# Patient Record
Sex: FEMALE | Race: BLACK OR AFRICAN AMERICAN | NOT HISPANIC OR LATINO | URBAN - METROPOLITAN AREA
[De-identification: names, ages, dates, MRNs, and addresses within clinical notes are randomized per-mention and may not be internally consistent; named-entity substitution may affect disease eponyms.]

---

## 2017-09-06 ENCOUNTER — OUTPATIENT (OUTPATIENT)
Dept: OUTPATIENT SERVICES | Facility: HOSPITAL | Age: 72
LOS: 1 days | End: 2017-09-06
Payer: COMMERCIAL

## 2017-09-06 VITALS
HEART RATE: 72 BPM | TEMPERATURE: 98 F | WEIGHT: 182.1 LBS | DIASTOLIC BLOOD PRESSURE: 70 MMHG | HEIGHT: 66 IN | OXYGEN SATURATION: 98 % | RESPIRATION RATE: 16 BRPM | SYSTOLIC BLOOD PRESSURE: 138 MMHG

## 2017-09-06 DIAGNOSIS — Z98.890 OTHER SPECIFIED POSTPROCEDURAL STATES: Chronic | ICD-10-CM

## 2017-09-06 DIAGNOSIS — M17.11 UNILATERAL PRIMARY OSTEOARTHRITIS, RIGHT KNEE: ICD-10-CM

## 2017-09-06 DIAGNOSIS — Z90.721 ACQUIRED ABSENCE OF OVARIES, UNILATERAL: Chronic | ICD-10-CM

## 2017-09-06 DIAGNOSIS — D17.0 BENIGN LIPOMATOUS NEOPLASM OF SKIN AND SUBCUTANEOUS TISSUE OF HEAD, FACE AND NECK: Chronic | ICD-10-CM

## 2017-09-06 DIAGNOSIS — Z01.818 ENCOUNTER FOR OTHER PREPROCEDURAL EXAMINATION: ICD-10-CM

## 2017-09-06 DIAGNOSIS — D17.39 BENIGN LIPOMATOUS NEOPLASM OF SKIN AND SUBCUTANEOUS TISSUE OF OTHER SITES: Chronic | ICD-10-CM

## 2017-09-06 DIAGNOSIS — Z90.49 ACQUIRED ABSENCE OF OTHER SPECIFIED PARTS OF DIGESTIVE TRACT: Chronic | ICD-10-CM

## 2017-09-06 PROCEDURE — 93017 CV STRESS TEST TRACING ONLY: CPT

## 2017-09-06 PROCEDURE — A9502: CPT

## 2017-09-06 PROCEDURE — 86901 BLOOD TYPING SEROLOGIC RH(D): CPT

## 2017-09-06 PROCEDURE — G0463: CPT

## 2017-09-06 PROCEDURE — 87640 STAPH A DNA AMP PROBE: CPT

## 2017-09-06 PROCEDURE — 87641 MR-STAPH DNA AMP PROBE: CPT

## 2017-09-06 PROCEDURE — 86900 BLOOD TYPING SEROLOGIC ABO: CPT

## 2017-09-06 PROCEDURE — 78452 HT MUSCLE IMAGE SPECT MULT: CPT

## 2017-09-06 PROCEDURE — 86850 RBC ANTIBODY SCREEN: CPT

## 2017-09-06 RX ORDER — SODIUM CHLORIDE 9 MG/ML
3 INJECTION INTRAMUSCULAR; INTRAVENOUS; SUBCUTANEOUS EVERY 8 HOURS
Qty: 0 | Refills: 0 | Status: DISCONTINUED | OUTPATIENT
Start: 2017-09-12 | End: 2017-09-12

## 2017-09-06 NOTE — H&P PST ADULT - PROBLEM SELECTOR PLAN 1
Patient is scheduled for right total knee replacement on 9/12/17. Patient is at moderate risk for this surgery.

## 2017-09-06 NOTE — H&P PST ADULT - PMH
Carpal tunnel syndrome on left    Cataract  bilateral eyes  CKD (chronic kidney disease)    Diabetes mellitus, type 2    Gout    Hypertension    Ovarian cyst  right  Primary osteoarthritis of left knee    Primary osteoarthritis of right knee    Spinal stenosis of lumbosacral region

## 2017-09-06 NOTE — H&P PST ADULT - NEGATIVE GASTROINTESTINAL SYMPTOMS
no constipation/no diarrhea/no nausea/no vomiting/no flatulence/no abdominal pain/no change in bowel habits

## 2017-09-06 NOTE — H&P PST ADULT - MUSCULOSKELETAL
details… detailed exam no joint warmth/decreased ROM due to pain/no calf tenderness/no joint erythema/no joint swelling/bilateral knees

## 2017-09-06 NOTE — H&P PST ADULT - HISTORY OF PRESENT ILLNESS
72 years old female presented with right and left knee pain, limited ROM, difficulty walking, uses cane x long time, right knee is worse than left . Diagnosed with unilateral  primary OA of right knee and is scheduled for right total knee replacement on 9/12/17.

## 2017-09-06 NOTE — H&P PST ADULT - DOES PATIENT HAVE ADVANCE DIRECTIVE
in  of emergency call patient's daughter Lucas Knox [phone] to make medical decisions about pt/No

## 2017-09-06 NOTE — H&P PST ADULT - NSANTHOSAYNRD_GEN_A_CORE
No. AL screening performed.  STOP BANG Legend: 0-2 = LOW Risk; 3-4 = INTERMEDIATE Risk; 5-8 = HIGH Risk

## 2017-09-06 NOTE — H&P PST ADULT - PSH
History of right salpingo-oophorectomy  1994  Lipoma of chest wall  sx removal  Lipoma of neck  sx removal  S/P appendectomy  1994  S/P carpal tunnel release  left hand- 2007

## 2017-09-07 LAB
MRSA PCR RESULT.: SIGNIFICANT CHANGE UP
S AUREUS DNA NOSE QL NAA+PROBE: SIGNIFICANT CHANGE UP

## 2017-09-12 ENCOUNTER — TRANSCRIPTION ENCOUNTER (OUTPATIENT)
Age: 72
End: 2017-09-12

## 2017-09-12 ENCOUNTER — RESULT REVIEW (OUTPATIENT)
Age: 72
End: 2017-09-12

## 2017-09-12 ENCOUNTER — INPATIENT (INPATIENT)
Facility: HOSPITAL | Age: 72
LOS: 3 days | Discharge: HOME HEALTH | DRG: 470 | End: 2017-09-16
Attending: ORTHOPAEDIC SURGERY | Admitting: ORTHOPAEDIC SURGERY
Payer: COMMERCIAL

## 2017-09-12 VITALS
OXYGEN SATURATION: 99 % | HEART RATE: 83 BPM | DIASTOLIC BLOOD PRESSURE: 73 MMHG | TEMPERATURE: 99 F | WEIGHT: 182.1 LBS | HEIGHT: 66 IN | RESPIRATION RATE: 14 BRPM | SYSTOLIC BLOOD PRESSURE: 168 MMHG

## 2017-09-12 DIAGNOSIS — D17.0 BENIGN LIPOMATOUS NEOPLASM OF SKIN AND SUBCUTANEOUS TISSUE OF HEAD, FACE AND NECK: Chronic | ICD-10-CM

## 2017-09-12 DIAGNOSIS — M17.11 UNILATERAL PRIMARY OSTEOARTHRITIS, RIGHT KNEE: ICD-10-CM

## 2017-09-12 DIAGNOSIS — Z98.890 OTHER SPECIFIED POSTPROCEDURAL STATES: Chronic | ICD-10-CM

## 2017-09-12 DIAGNOSIS — Z90.49 ACQUIRED ABSENCE OF OTHER SPECIFIED PARTS OF DIGESTIVE TRACT: Chronic | ICD-10-CM

## 2017-09-12 DIAGNOSIS — Z90.721 ACQUIRED ABSENCE OF OVARIES, UNILATERAL: Chronic | ICD-10-CM

## 2017-09-12 DIAGNOSIS — D17.39 BENIGN LIPOMATOUS NEOPLASM OF SKIN AND SUBCUTANEOUS TISSUE OF OTHER SITES: Chronic | ICD-10-CM

## 2017-09-12 LAB
ANION GAP SERPL CALC-SCNC: 7 MMOL/L — SIGNIFICANT CHANGE UP (ref 5–17)
BUN SERPL-MCNC: 33 MG/DL — HIGH (ref 7–18)
CALCIUM SERPL-MCNC: 8.7 MG/DL — SIGNIFICANT CHANGE UP (ref 8.4–10.5)
CHLORIDE SERPL-SCNC: 107 MMOL/L — SIGNIFICANT CHANGE UP (ref 96–108)
CO2 SERPL-SCNC: 24 MMOL/L — SIGNIFICANT CHANGE UP (ref 22–31)
CREAT SERPL-MCNC: 1.64 MG/DL — HIGH (ref 0.5–1.3)
GLUCOSE SERPL-MCNC: 233 MG/DL — HIGH (ref 70–99)
HCT VFR BLD CALC: 30.6 % — LOW (ref 34.5–45)
HGB BLD-MCNC: 10.4 G/DL — LOW (ref 11.5–15.5)
MCHC RBC-ENTMCNC: 29.8 PG — SIGNIFICANT CHANGE UP (ref 27–34)
MCHC RBC-ENTMCNC: 33.8 GM/DL — SIGNIFICANT CHANGE UP (ref 32–36)
MCV RBC AUTO: 88.1 FL — SIGNIFICANT CHANGE UP (ref 80–100)
PLATELET # BLD AUTO: 148 K/UL — LOW (ref 150–400)
POTASSIUM SERPL-MCNC: 4.6 MMOL/L — SIGNIFICANT CHANGE UP (ref 3.5–5.3)
POTASSIUM SERPL-SCNC: 4.6 MMOL/L — SIGNIFICANT CHANGE UP (ref 3.5–5.3)
RBC # BLD: 3.47 M/UL — LOW (ref 3.8–5.2)
RBC # FLD: 13.4 % — SIGNIFICANT CHANGE UP (ref 10.3–14.5)
SODIUM SERPL-SCNC: 138 MMOL/L — SIGNIFICANT CHANGE UP (ref 135–145)
WBC # BLD: 7 K/UL — SIGNIFICANT CHANGE UP (ref 3.8–10.5)
WBC # FLD AUTO: 7 K/UL — SIGNIFICANT CHANGE UP (ref 3.8–10.5)

## 2017-09-12 PROCEDURE — 20900 REMOVAL OF BONE FOR GRAFT: CPT | Mod: AS,59

## 2017-09-12 PROCEDURE — 73562 X-RAY EXAM OF KNEE 3: CPT | Mod: 26,RT

## 2017-09-12 PROCEDURE — 27339 EXC THIGH/KNEE TUM DEP 5CM/>: CPT | Mod: AS,59,RT

## 2017-09-12 PROCEDURE — 88311 DECALCIFY TISSUE: CPT | Mod: 26

## 2017-09-12 PROCEDURE — 88305 TISSUE EXAM BY PATHOLOGIST: CPT | Mod: 26

## 2017-09-12 PROCEDURE — 27447 TOTAL KNEE ARTHROPLASTY: CPT | Mod: AS,RT

## 2017-09-12 PROCEDURE — 73560 X-RAY EXAM OF KNEE 1 OR 2: CPT | Mod: 26

## 2017-09-12 RX ORDER — SODIUM CHLORIDE 9 MG/ML
1000 INJECTION, SOLUTION INTRAVENOUS
Qty: 0 | Refills: 0 | Status: DISCONTINUED | OUTPATIENT
Start: 2017-09-12 | End: 2017-09-16

## 2017-09-12 RX ORDER — BUPIVACAINE 13.3 MG/ML
20 INJECTION, SUSPENSION, LIPOSOMAL INFILTRATION ONCE
Qty: 0 | Refills: 0 | Status: COMPLETED | OUTPATIENT
Start: 2017-09-12 | End: 2017-09-12

## 2017-09-12 RX ORDER — ENOXAPARIN SODIUM 100 MG/ML
30 INJECTION SUBCUTANEOUS EVERY 12 HOURS
Qty: 0 | Refills: 0 | Status: DISCONTINUED | OUTPATIENT
Start: 2017-09-12 | End: 2017-09-16

## 2017-09-12 RX ORDER — FOLIC ACID 0.8 MG
1 TABLET ORAL DAILY
Qty: 0 | Refills: 0 | Status: DISCONTINUED | OUTPATIENT
Start: 2017-09-12 | End: 2017-09-16

## 2017-09-12 RX ORDER — INSULIN LISPRO 100/ML
VIAL (ML) SUBCUTANEOUS
Qty: 0 | Refills: 0 | Status: DISCONTINUED | OUTPATIENT
Start: 2017-09-12 | End: 2017-09-16

## 2017-09-12 RX ORDER — CARVEDILOL PHOSPHATE 80 MG/1
12.5 CAPSULE, EXTENDED RELEASE ORAL EVERY 12 HOURS
Qty: 0 | Refills: 0 | Status: DISCONTINUED | OUTPATIENT
Start: 2017-09-12 | End: 2017-09-16

## 2017-09-12 RX ORDER — ONDANSETRON 8 MG/1
4 TABLET, FILM COATED ORAL EVERY 6 HOURS
Qty: 0 | Refills: 0 | Status: DISCONTINUED | OUTPATIENT
Start: 2017-09-12 | End: 2017-09-16

## 2017-09-12 RX ORDER — AMLODIPINE BESYLATE 2.5 MG/1
10 TABLET ORAL DAILY
Qty: 0 | Refills: 0 | Status: DISCONTINUED | OUTPATIENT
Start: 2017-09-12 | End: 2017-09-16

## 2017-09-12 RX ORDER — ATORVASTATIN CALCIUM 80 MG/1
40 TABLET, FILM COATED ORAL AT BEDTIME
Qty: 0 | Refills: 0 | Status: DISCONTINUED | OUTPATIENT
Start: 2017-09-12 | End: 2017-09-16

## 2017-09-12 RX ORDER — ALLOPURINOL 300 MG
1 TABLET ORAL
Qty: 0 | Refills: 0 | COMMUNITY

## 2017-09-12 RX ORDER — DOCUSATE SODIUM 100 MG
100 CAPSULE ORAL THREE TIMES A DAY
Qty: 0 | Refills: 0 | Status: DISCONTINUED | OUTPATIENT
Start: 2017-09-12 | End: 2017-09-16

## 2017-09-12 RX ORDER — CEFAZOLIN SODIUM 1 G
1000 VIAL (EA) INJECTION EVERY 8 HOURS
Qty: 0 | Refills: 0 | Status: COMPLETED | OUTPATIENT
Start: 2017-09-12 | End: 2017-09-13

## 2017-09-12 RX ORDER — DEXTROSE 50 % IN WATER 50 %
25 SYRINGE (ML) INTRAVENOUS ONCE
Qty: 0 | Refills: 0 | Status: DISCONTINUED | OUTPATIENT
Start: 2017-09-12 | End: 2017-09-16

## 2017-09-12 RX ORDER — GLUCAGON INJECTION, SOLUTION 0.5 MG/.1ML
1 INJECTION, SOLUTION SUBCUTANEOUS ONCE
Qty: 0 | Refills: 0 | Status: DISCONTINUED | OUTPATIENT
Start: 2017-09-12 | End: 2017-09-16

## 2017-09-12 RX ORDER — SENNA PLUS 8.6 MG/1
2 TABLET ORAL AT BEDTIME
Qty: 0 | Refills: 0 | Status: DISCONTINUED | OUTPATIENT
Start: 2017-09-12 | End: 2017-09-16

## 2017-09-12 RX ORDER — HYDROMORPHONE HYDROCHLORIDE 2 MG/ML
0.5 INJECTION INTRAMUSCULAR; INTRAVENOUS; SUBCUTANEOUS
Qty: 0 | Refills: 0 | Status: DISCONTINUED | OUTPATIENT
Start: 2017-09-12 | End: 2017-09-14

## 2017-09-12 RX ORDER — HYDROMORPHONE HYDROCHLORIDE 2 MG/ML
0.5 INJECTION INTRAMUSCULAR; INTRAVENOUS; SUBCUTANEOUS
Qty: 0 | Refills: 0 | Status: DISCONTINUED | OUTPATIENT
Start: 2017-09-12 | End: 2017-09-12

## 2017-09-12 RX ORDER — ASCORBIC ACID 60 MG
500 TABLET,CHEWABLE ORAL
Qty: 0 | Refills: 0 | Status: DISCONTINUED | OUTPATIENT
Start: 2017-09-12 | End: 2017-09-16

## 2017-09-12 RX ORDER — NALOXONE HYDROCHLORIDE 4 MG/.1ML
0.1 SPRAY NASAL
Qty: 0 | Refills: 0 | Status: DISCONTINUED | OUTPATIENT
Start: 2017-09-12 | End: 2017-09-16

## 2017-09-12 RX ORDER — FERROUS SULFATE 325(65) MG
325 TABLET ORAL
Qty: 0 | Refills: 0 | Status: DISCONTINUED | OUTPATIENT
Start: 2017-09-12 | End: 2017-09-16

## 2017-09-12 RX ORDER — ACETAMINOPHEN 500 MG
1000 TABLET ORAL ONCE
Qty: 0 | Refills: 0 | Status: DISCONTINUED | OUTPATIENT
Start: 2017-09-12 | End: 2017-09-14

## 2017-09-12 RX ORDER — DEXTROSE 50 % IN WATER 50 %
1 SYRINGE (ML) INTRAVENOUS ONCE
Qty: 0 | Refills: 0 | Status: DISCONTINUED | OUTPATIENT
Start: 2017-09-12 | End: 2017-09-16

## 2017-09-12 RX ORDER — HYDROMORPHONE HYDROCHLORIDE 2 MG/ML
30 INJECTION INTRAMUSCULAR; INTRAVENOUS; SUBCUTANEOUS
Qty: 0 | Refills: 0 | Status: DISCONTINUED | OUTPATIENT
Start: 2017-09-12 | End: 2017-09-14

## 2017-09-12 RX ORDER — LISINOPRIL 2.5 MG/1
40 TABLET ORAL DAILY
Qty: 0 | Refills: 0 | Status: DISCONTINUED | OUTPATIENT
Start: 2017-09-12 | End: 2017-09-16

## 2017-09-12 RX ORDER — ACETAMINOPHEN 500 MG
650 TABLET ORAL EVERY 6 HOURS
Qty: 0 | Refills: 0 | Status: DISCONTINUED | OUTPATIENT
Start: 2017-09-12 | End: 2017-09-16

## 2017-09-12 RX ORDER — DEXTROSE 50 % IN WATER 50 %
12.5 SYRINGE (ML) INTRAVENOUS ONCE
Qty: 0 | Refills: 0 | Status: DISCONTINUED | OUTPATIENT
Start: 2017-09-12 | End: 2017-09-16

## 2017-09-12 RX ADMIN — Medication 100 MILLIGRAM(S): at 21:05

## 2017-09-12 RX ADMIN — HYDROMORPHONE HYDROCHLORIDE 30 MILLILITER(S): 2 INJECTION INTRAMUSCULAR; INTRAVENOUS; SUBCUTANEOUS at 15:28

## 2017-09-12 RX ADMIN — Medication 325 MILLIGRAM(S): at 17:41

## 2017-09-12 RX ADMIN — Medication 4: at 17:41

## 2017-09-12 RX ADMIN — HYDROMORPHONE HYDROCHLORIDE 30 MILLILITER(S): 2 INJECTION INTRAMUSCULAR; INTRAVENOUS; SUBCUTANEOUS at 11:33

## 2017-09-12 RX ADMIN — HYDROMORPHONE HYDROCHLORIDE 30 MILLILITER(S): 2 INJECTION INTRAMUSCULAR; INTRAVENOUS; SUBCUTANEOUS at 15:02

## 2017-09-12 RX ADMIN — Medication 500 MILLIGRAM(S): at 17:41

## 2017-09-12 RX ADMIN — Medication 6: at 12:09

## 2017-09-12 RX ADMIN — ENOXAPARIN SODIUM 30 MILLIGRAM(S): 100 INJECTION SUBCUTANEOUS at 21:04

## 2017-09-12 RX ADMIN — SODIUM CHLORIDE 75 MILLILITER(S): 9 INJECTION, SOLUTION INTRAVENOUS at 17:41

## 2017-09-12 RX ADMIN — ATORVASTATIN CALCIUM 40 MILLIGRAM(S): 80 TABLET, FILM COATED ORAL at 21:05

## 2017-09-12 RX ADMIN — HYDROMORPHONE HYDROCHLORIDE 30 MILLILITER(S): 2 INJECTION INTRAMUSCULAR; INTRAVENOUS; SUBCUTANEOUS at 19:06

## 2017-09-12 RX ADMIN — Medication 100 MILLIGRAM(S): at 18:09

## 2017-09-12 RX ADMIN — HYDROMORPHONE HYDROCHLORIDE 0.5 MILLIGRAM(S): 2 INJECTION INTRAMUSCULAR; INTRAVENOUS; SUBCUTANEOUS at 11:25

## 2017-09-12 RX ADMIN — HYDROMORPHONE HYDROCHLORIDE 0.5 MILLIGRAM(S): 2 INJECTION INTRAMUSCULAR; INTRAVENOUS; SUBCUTANEOUS at 12:12

## 2017-09-12 NOTE — PATIENT PROFILE ADULT. - DOES PATIENT HAVE ADVANCE DIRECTIVE
in  of emergency call patient's daughter Lucas Knox [phone] to make medical decisions about pt/No

## 2017-09-12 NOTE — PHYSICAL THERAPY INITIAL EVALUATION ADULT - IMPAIRMENTS FOUND, PT EVAL
ROM/gait, locomotion, and balance/joint integrity and mobility/muscle strength/gross motor/aerobic capacity/endurance

## 2017-09-12 NOTE — BRIEF OPERATIVE NOTE - PROCEDURE
Total knee replacement  09/12/2017  right  Active  DOROTHEA <<-----Click on this checkbox to enter Procedure Total knee replacement  09/12/2017  right  Active  Deep Fernandez

## 2017-09-12 NOTE — PHYSICAL THERAPY INITIAL EVALUATION ADULT - LEVEL OF INDEPENDENCE: STAIR NEGOTIATION, REHAB EVAL
Unable to assess pt on the stairs at this time, pt does not exhibit appropriate pre requisite skills to safely negotiate unlevel surfaces due to poor endurance, decrease musculature, unsteady gait and day 0 TKR which placed pt significant risks for falls and injury

## 2017-09-12 NOTE — PHYSICAL THERAPY INITIAL EVALUATION ADULT - GENERAL OBSERVATIONS, REHAB EVAL
pt seen day 0, resting in bed, PCA meds in place, s/p TKR denies pain, review pre op class TE, able to long sit with assistance

## 2017-09-13 DIAGNOSIS — G89.18 OTHER ACUTE POSTPROCEDURAL PAIN: ICD-10-CM

## 2017-09-13 DIAGNOSIS — I10 ESSENTIAL (PRIMARY) HYPERTENSION: ICD-10-CM

## 2017-09-13 DIAGNOSIS — N18.9 CHRONIC KIDNEY DISEASE, UNSPECIFIED: ICD-10-CM

## 2017-09-13 DIAGNOSIS — M25.561 PAIN IN RIGHT KNEE: ICD-10-CM

## 2017-09-13 DIAGNOSIS — Z96.651 PRESENCE OF RIGHT ARTIFICIAL KNEE JOINT: ICD-10-CM

## 2017-09-13 DIAGNOSIS — E11.9 TYPE 2 DIABETES MELLITUS WITHOUT COMPLICATIONS: ICD-10-CM

## 2017-09-13 LAB
ANION GAP SERPL CALC-SCNC: 11 MMOL/L — SIGNIFICANT CHANGE UP (ref 5–17)
BUN SERPL-MCNC: 39 MG/DL — HIGH (ref 7–18)
CALCIUM SERPL-MCNC: 8.6 MG/DL — SIGNIFICANT CHANGE UP (ref 8.4–10.5)
CHLORIDE SERPL-SCNC: 102 MMOL/L — SIGNIFICANT CHANGE UP (ref 96–108)
CO2 SERPL-SCNC: 23 MMOL/L — SIGNIFICANT CHANGE UP (ref 22–31)
CREAT SERPL-MCNC: 1.67 MG/DL — HIGH (ref 0.5–1.3)
GLUCOSE SERPL-MCNC: 168 MG/DL — HIGH (ref 70–99)
HBA1C BLD-MCNC: 7.4 % — HIGH (ref 4–5.6)
HCT VFR BLD CALC: 27 % — LOW (ref 34.5–45)
HGB BLD-MCNC: 9.3 G/DL — LOW (ref 11.5–15.5)
MCHC RBC-ENTMCNC: 30.4 PG — SIGNIFICANT CHANGE UP (ref 27–34)
MCHC RBC-ENTMCNC: 34.5 GM/DL — SIGNIFICANT CHANGE UP (ref 32–36)
MCV RBC AUTO: 88 FL — SIGNIFICANT CHANGE UP (ref 80–100)
PLATELET # BLD AUTO: 139 K/UL — LOW (ref 150–400)
POTASSIUM SERPL-MCNC: 4.8 MMOL/L — SIGNIFICANT CHANGE UP (ref 3.5–5.3)
POTASSIUM SERPL-SCNC: 4.8 MMOL/L — SIGNIFICANT CHANGE UP (ref 3.5–5.3)
RBC # BLD: 3.06 M/UL — LOW (ref 3.8–5.2)
RBC # FLD: 13.4 % — SIGNIFICANT CHANGE UP (ref 10.3–14.5)
SODIUM SERPL-SCNC: 136 MMOL/L — SIGNIFICANT CHANGE UP (ref 135–145)
WBC # BLD: 12.2 K/UL — HIGH (ref 3.8–10.5)
WBC # FLD AUTO: 12.2 K/UL — HIGH (ref 3.8–10.5)

## 2017-09-13 PROCEDURE — 99233 SBSQ HOSP IP/OBS HIGH 50: CPT

## 2017-09-13 RX ADMIN — CARVEDILOL PHOSPHATE 12.5 MILLIGRAM(S): 80 CAPSULE, EXTENDED RELEASE ORAL at 05:50

## 2017-09-13 RX ADMIN — HYDROMORPHONE HYDROCHLORIDE 30 MILLILITER(S): 2 INJECTION INTRAMUSCULAR; INTRAVENOUS; SUBCUTANEOUS at 07:07

## 2017-09-13 RX ADMIN — Medication 1 MILLIGRAM(S): at 11:14

## 2017-09-13 RX ADMIN — Medication 325 MILLIGRAM(S): at 11:14

## 2017-09-13 RX ADMIN — Medication 2: at 17:20

## 2017-09-13 RX ADMIN — ENOXAPARIN SODIUM 30 MILLIGRAM(S): 100 INJECTION SUBCUTANEOUS at 11:13

## 2017-09-13 RX ADMIN — Medication 100 MILLIGRAM(S): at 14:45

## 2017-09-13 RX ADMIN — Medication: at 07:32

## 2017-09-13 RX ADMIN — Medication 100 MILLIGRAM(S): at 21:20

## 2017-09-13 RX ADMIN — Medication 100 MILLIGRAM(S): at 05:50

## 2017-09-13 RX ADMIN — CARVEDILOL PHOSPHATE 12.5 MILLIGRAM(S): 80 CAPSULE, EXTENDED RELEASE ORAL at 17:20

## 2017-09-13 RX ADMIN — Medication 100 MILLIGRAM(S): at 01:03

## 2017-09-13 RX ADMIN — ENOXAPARIN SODIUM 30 MILLIGRAM(S): 100 INJECTION SUBCUTANEOUS at 21:20

## 2017-09-13 RX ADMIN — AMLODIPINE BESYLATE 10 MILLIGRAM(S): 2.5 TABLET ORAL at 05:50

## 2017-09-13 RX ADMIN — HYDROMORPHONE HYDROCHLORIDE 30 MILLILITER(S): 2 INJECTION INTRAMUSCULAR; INTRAVENOUS; SUBCUTANEOUS at 19:07

## 2017-09-13 RX ADMIN — Medication 500 MILLIGRAM(S): at 17:20

## 2017-09-13 RX ADMIN — Medication 325 MILLIGRAM(S): at 07:48

## 2017-09-13 RX ADMIN — ATORVASTATIN CALCIUM 40 MILLIGRAM(S): 80 TABLET, FILM COATED ORAL at 21:20

## 2017-09-13 RX ADMIN — Medication 500 MILLIGRAM(S): at 05:50

## 2017-09-13 RX ADMIN — Medication 325 MILLIGRAM(S): at 17:20

## 2017-09-13 RX ADMIN — LISINOPRIL 40 MILLIGRAM(S): 2.5 TABLET ORAL at 05:50

## 2017-09-13 RX ADMIN — Medication 6: at 11:13

## 2017-09-13 NOTE — PROGRESS NOTE ADULT - SUBJECTIVE AND OBJECTIVE BOX
72yFemale    Diagnosis:  S/p R Total Knee Replacement POD# 1    Patient was seen and evaluated at bedside. Patient with no acute complaints.   Pain is  well controlled.  Jacquie Rodriguez this AM.   Pain is controlled via PCA.  Denies CP/SOB, dyspnea, paresthesias, N/V/D, palpitations.     Vital Signs Last 24 Hrs  T(C): 36.6 (13 Sep 2017 06:39), Max: 36.8 (12 Sep 2017 20:58)  T(F): 97.9 (13 Sep 2017 06:39), Max: 98.3 (12 Sep 2017 20:58)  HR: 75 (13 Sep 2017 06:39) (55 - 87)  BP: 136/56 (13 Sep 2017 06:39) (129/64 - 158/79)  BP(mean): --  RR: 17 (13 Sep 2017 06:39) (14 - 18)  SpO2: 100% (13 Sep 2017 06:39) (100% - 100%)  I&O's Detail    12 Sep 2017 07:01  -  13 Sep 2017 07:00  --------------------------------------------------------  IN:    sodium chloride 0.45%.: 300 mL  Total IN: 300 mL    OUT:    Accordian: 20 mL    Bulb: 570 mL    Indwelling Catheter - Urethral: 1220 mL  Total OUT: 1810 mL    Total NET: -1510 mL      Physical Exam:    General: AAOx3, NAD, resting comfortably in bed.    R KNEE:  Dressing is C/D/I. No swelling. No erythema. In normal alignment.   Lower extremity:  No calf tenderness, calves are soft. 2+pulses. NVI. 5/5 Strength of EHL/TA/gastrocnemius B/L.  Good capillary refill.                           9.3    12.2  )-----------( 139      ( 13 Sep 2017 07:37 )             27.0     09-13    136  |  102  |  39<H>  ----------------------------<  168<H>  4.8   |  23  |  1.67<H>    Ca    8.6      13 Sep 2017 07:37        Impression:  72yFemale S/p R Total Knee Replacement POD# 1  Plan:  -  Pain management  -  Dvt prophylaxis  -  Daily Physical Therapy:  WBAT  to RLE  -  Discharge planning: Home Vs. Rehab pending Physical therapy eval.  -  Heel bump to RLE  -  Incentive Spirometer  -  Continue with Post-op Antibiotics x 24hrs  -  Discontinued Dhillon catheter today  -  D/c   -  Case d/w DR. Vera

## 2017-09-13 NOTE — PROGRESS NOTE ADULT - PROBLEM SELECTOR PLAN 1
- continue pca hydromorphone  - pca teaching teaching done  - iv acetaminophen prn  - no nsaids due to casper  - stool softeners  - oob/pt

## 2017-09-14 LAB
ANION GAP SERPL CALC-SCNC: 10 MMOL/L — SIGNIFICANT CHANGE UP (ref 5–17)
BUN SERPL-MCNC: 33 MG/DL — HIGH (ref 7–18)
CALCIUM SERPL-MCNC: 8.9 MG/DL — SIGNIFICANT CHANGE UP (ref 8.4–10.5)
CHLORIDE SERPL-SCNC: 101 MMOL/L — SIGNIFICANT CHANGE UP (ref 96–108)
CO2 SERPL-SCNC: 24 MMOL/L — SIGNIFICANT CHANGE UP (ref 22–31)
CREAT SERPL-MCNC: 1.3 MG/DL — SIGNIFICANT CHANGE UP (ref 0.5–1.3)
GLUCOSE SERPL-MCNC: 191 MG/DL — HIGH (ref 70–99)
HCT VFR BLD CALC: 23.7 % — LOW (ref 34.5–45)
HGB BLD-MCNC: 8.1 G/DL — LOW (ref 11.5–15.5)
MCHC RBC-ENTMCNC: 30.1 PG — SIGNIFICANT CHANGE UP (ref 27–34)
MCHC RBC-ENTMCNC: 34.3 GM/DL — SIGNIFICANT CHANGE UP (ref 32–36)
MCV RBC AUTO: 87.7 FL — SIGNIFICANT CHANGE UP (ref 80–100)
PLATELET # BLD AUTO: 110 K/UL — LOW (ref 150–400)
POTASSIUM SERPL-MCNC: 4.6 MMOL/L — SIGNIFICANT CHANGE UP (ref 3.5–5.3)
POTASSIUM SERPL-SCNC: 4.6 MMOL/L — SIGNIFICANT CHANGE UP (ref 3.5–5.3)
RBC # BLD: 2.7 M/UL — LOW (ref 3.8–5.2)
RBC # FLD: 13.3 % — SIGNIFICANT CHANGE UP (ref 10.3–14.5)
SODIUM SERPL-SCNC: 135 MMOL/L — SIGNIFICANT CHANGE UP (ref 135–145)
SURGICAL PATHOLOGY FINAL REPORT - CH: SIGNIFICANT CHANGE UP
WBC # BLD: 10.5 K/UL — SIGNIFICANT CHANGE UP (ref 3.8–10.5)
WBC # FLD AUTO: 10.5 K/UL — SIGNIFICANT CHANGE UP (ref 3.8–10.5)

## 2017-09-14 PROCEDURE — 99233 SBSQ HOSP IP/OBS HIGH 50: CPT

## 2017-09-14 RX ORDER — TRAMADOL HYDROCHLORIDE 50 MG/1
50 TABLET ORAL EVERY 4 HOURS
Qty: 0 | Refills: 0 | Status: DISCONTINUED | OUTPATIENT
Start: 2017-09-14 | End: 2017-09-16

## 2017-09-14 RX ORDER — FUROSEMIDE 40 MG
40 TABLET ORAL ONCE
Qty: 0 | Refills: 0 | Status: COMPLETED | OUTPATIENT
Start: 2017-09-14 | End: 2017-09-14

## 2017-09-14 RX ORDER — ACETAMINOPHEN 500 MG
1000 TABLET ORAL EVERY 6 HOURS
Qty: 0 | Refills: 0 | Status: COMPLETED | OUTPATIENT
Start: 2017-09-14 | End: 2017-09-14

## 2017-09-14 RX ADMIN — Medication 1000 MILLIGRAM(S): at 22:00

## 2017-09-14 RX ADMIN — Medication 325 MILLIGRAM(S): at 12:08

## 2017-09-14 RX ADMIN — Medication 100 MILLIGRAM(S): at 21:27

## 2017-09-14 RX ADMIN — Medication 325 MILLIGRAM(S): at 17:17

## 2017-09-14 RX ADMIN — Medication 1 MILLIGRAM(S): at 12:08

## 2017-09-14 RX ADMIN — Medication 400 MILLIGRAM(S): at 21:32

## 2017-09-14 RX ADMIN — ENOXAPARIN SODIUM 30 MILLIGRAM(S): 100 INJECTION SUBCUTANEOUS at 21:27

## 2017-09-14 RX ADMIN — Medication 500 MILLIGRAM(S): at 17:17

## 2017-09-14 RX ADMIN — ENOXAPARIN SODIUM 30 MILLIGRAM(S): 100 INJECTION SUBCUTANEOUS at 12:07

## 2017-09-14 RX ADMIN — Medication 2: at 17:16

## 2017-09-14 RX ADMIN — Medication 40 MILLIGRAM(S): at 13:41

## 2017-09-14 RX ADMIN — Medication 2: at 12:08

## 2017-09-14 RX ADMIN — Medication 650 MILLIGRAM(S): at 15:39

## 2017-09-14 RX ADMIN — CARVEDILOL PHOSPHATE 12.5 MILLIGRAM(S): 80 CAPSULE, EXTENDED RELEASE ORAL at 17:16

## 2017-09-14 RX ADMIN — LISINOPRIL 40 MILLIGRAM(S): 2.5 TABLET ORAL at 05:50

## 2017-09-14 RX ADMIN — AMLODIPINE BESYLATE 10 MILLIGRAM(S): 2.5 TABLET ORAL at 05:50

## 2017-09-14 RX ADMIN — Medication 500 MILLIGRAM(S): at 05:50

## 2017-09-14 RX ADMIN — Medication 100 MILLIGRAM(S): at 05:50

## 2017-09-14 RX ADMIN — Medication 100 MILLIGRAM(S): at 13:41

## 2017-09-14 RX ADMIN — HYDROMORPHONE HYDROCHLORIDE 30 MILLILITER(S): 2 INJECTION INTRAMUSCULAR; INTRAVENOUS; SUBCUTANEOUS at 07:07

## 2017-09-14 RX ADMIN — Medication 325 MILLIGRAM(S): at 08:55

## 2017-09-14 RX ADMIN — ATORVASTATIN CALCIUM 40 MILLIGRAM(S): 80 TABLET, FILM COATED ORAL at 21:27

## 2017-09-14 RX ADMIN — Medication 4: at 08:54

## 2017-09-14 RX ADMIN — CARVEDILOL PHOSPHATE 12.5 MILLIGRAM(S): 80 CAPSULE, EXTENDED RELEASE ORAL at 05:50

## 2017-09-14 NOTE — PROGRESS NOTE ADULT - SUBJECTIVE AND OBJECTIVE BOX
Ortho Note POD# 2  72yFemale    Diagnosis:  S/p Right Total Knee Replacement POD# 2    Patient is seen and evaluated at bedside; offers no acute complaints. Pain is mild; well controlled.  Has been OOB with PT; ambulation with walker    Vital Signs Last 24 Hrs  T(C): 37.3 (14 Sep 2017 06:54), Max: 37.3 (14 Sep 2017 06:54)  T(F): 99.2 (14 Sep 2017 06:54), Max: 99.2 (14 Sep 2017 06:54)  HR: 77 (14 Sep 2017 06:54) (68 - 77)  BP: 155/65 (14 Sep 2017 06:54) (131/60 - 155/65)  BP(mean): --  RR: 16 (14 Sep 2017 06:54) (16 - 16)  SpO2: 100% (14 Sep 2017 06:54) (98% - 100%)    Physical Exam:    General: AAOx3, in NAD, resting in bed.    Right knee:  In nl. alignment. Wound C/D/I; healing well.  Staples intact. Calves are soft, non-tender. 2+pulses. NVI.                          8.1    10.5  )-----------( 110      ( 14 Sep 2017 07:27 )             23.7     09-14    135  |  101  |  33<H>  ----------------------------<  191<H>  4.6   |  24  |  1.30    Ca    8.9      14 Sep 2017 07:27        Impression:  72yFemale S/p Right total knee replacement POD# 2  Plan:  -  Continue pain management  -  DVT prophylaxis with Lovenox  -  Daily Physical Therapy:  WBAT on RLE with walker  -  Discharge planning for tomorrow  -  Dressing changed  -  Acute blood loss anemia - Transfuse 2 units of PRBC - Consented  -  Encouraged use of incentive spirometer  -  Case d/w

## 2017-09-14 NOTE — PROGRESS NOTE ADULT - PROBLEM SELECTOR PLAN 1
- d/c pca  - iv acetaminophen prn  - no nsaids due to casper  - stool softeners  - oob/pt  - percocet po prn - d/c pca  - iv acetaminophen prn  - no nsaids due to casper  - stool softeners  - oob/pt  - tramadol 50mg po q 6 hours prn

## 2017-09-14 NOTE — PROGRESS NOTE ADULT - SUBJECTIVE AND OBJECTIVE BOX
Chief Complaint: right knee pain    HPI:   72y Female s/p right knee replacement, POD#1.  Pt complaining of right knee pain which worsens on exertion.  No nausea or vomiting.  No chest pain or sob.  Pt's h/h decreased - pt receiving 2 units of blood today.      PAIN SCORE:   4/10      SCALE USED: (1-10 VNRS)    Allergies    No Known Allergies    Intolerances    codeine (Nausea)    MEDICATIONS  (STANDING):  atorvastatin 40 milliGRAM(s) Oral at bedtime  amLODIPine   Tablet 10 milliGRAM(s) Oral daily  lisinopril 40 milliGRAM(s) Oral daily  carvedilol 12.5 milliGRAM(s) Oral every 12 hours  sodium chloride 0.45%. 1000 milliLiter(s) (75 mL/Hr) IV Continuous <Continuous>  enoxaparin Injectable 30 milliGRAM(s) SubCutaneous every 12 hours  docusate sodium 100 milliGRAM(s) Oral three times a day  ferrous    sulfate 325 milliGRAM(s) Oral three times a day with meals  folic acid 1 milliGRAM(s) Oral daily  ascorbic acid 500 milliGRAM(s) Oral two times a day  insulin lispro (HumaLOG) corrective regimen sliding scale   SubCutaneous three times a day before meals  dextrose 5%. 1000 milliLiter(s) (50 mL/Hr) IV Continuous <Continuous>  dextrose 50% Injectable 12.5 Gram(s) IV Push once  dextrose 50% Injectable 25 Gram(s) IV Push once  dextrose 50% Injectable 25 Gram(s) IV Push once  furosemide   Injectable 40 milliGRAM(s) IV Push once    MEDICATIONS  (PRN):  acetaminophen   Tablet 650 milliGRAM(s) Oral every 6 hours PRN For Temp over 38.3 C (100.94 F)  aluminum hydroxide/magnesium hydroxide/simethicone Suspension 30 milliLiter(s) Oral four times a day PRN Indigestion  ondansetron Injectable 4 milliGRAM(s) IV Push every 6 hours PRN Nausea and/or Vomiting  bisacodyl Suppository 10 milliGRAM(s) Rectal daily PRN If no bowel movement by postoperative day #2  senna 2 Tablet(s) Oral at bedtime PRN Constipation  dextrose Gel 1 Dose(s) Oral once PRN Blood Glucose LESS THAN 70 milliGRAM(s)/deciliter  glucagon  Injectable 1 milliGRAM(s) IntraMuscular once PRN Glucose LESS THAN 70 milligrams/deciliter  naloxone Injectable 0.1 milliGRAM(s) IV Push every 3 minutes PRN For ANY of the following changes in patient status:  A. RR LESS THAN 10 breaths per minute, B. Oxygen saturation LESS THAN 90%, C. Sedation score of 6  ondansetron Injectable 4 milliGRAM(s) IV Push every 6 hours PRN Nausea  traMADol 50 milliGRAM(s) Oral every 4 hours PRN Moderate Pain (4 - 6)  acetaminophen  IVPB. 1000 milliGRAM(s) IV Intermittent every 6 hours PRN Severe Pain (7 - 10)      PHYSICAL EXAM:    Vital Signs Last 24 Hrs  T(C): 37.2 (14 Sep 2017 10:33), Max: 37.3 (14 Sep 2017 06:54)  T(F): 99 (14 Sep 2017 10:33), Max: 99.2 (14 Sep 2017 06:54)  HR: 64 (14 Sep 2017 10:33) (64 - 77)  BP: 125/56 (14 Sep 2017 10:33) (125/56 - 155/65)  BP(mean): --  RR: 15 (14 Sep 2017 10:33) (15 - 16)  SpO2: 96% (14 Sep 2017 10:33) (96% - 100%)             LABS:                          8.1    10.5  )-----------( 110      ( 14 Sep 2017 07:27 )             23.7     09-14    135  |  101  |  33<H>  ----------------------------<  191<H>  4.6   |  24  |  1.30    Ca    8.9      14 Sep 2017 07:27            Drug Screen:        RADIOLOGY:

## 2017-09-14 NOTE — PROVIDER CONTACT NOTE (OTHER) - ACTION/TREATMENT ORDERED:
Held 2 unit prbc, PO Tylenol given, will reassess, pt made aware of plan of care and verbalizes understanding

## 2017-09-15 ENCOUNTER — TRANSCRIPTION ENCOUNTER (OUTPATIENT)
Age: 72
End: 2017-09-15

## 2017-09-15 LAB
ANION GAP SERPL CALC-SCNC: 8 MMOL/L — SIGNIFICANT CHANGE UP (ref 5–17)
APPEARANCE UR: CLEAR — SIGNIFICANT CHANGE UP
BILIRUB UR-MCNC: NEGATIVE — SIGNIFICANT CHANGE UP
BUN SERPL-MCNC: 36 MG/DL — HIGH (ref 7–18)
CALCIUM SERPL-MCNC: 8.9 MG/DL — SIGNIFICANT CHANGE UP (ref 8.4–10.5)
CHLORIDE SERPL-SCNC: 99 MMOL/L — SIGNIFICANT CHANGE UP (ref 96–108)
CO2 SERPL-SCNC: 27 MMOL/L — SIGNIFICANT CHANGE UP (ref 22–31)
COLOR SPEC: YELLOW — SIGNIFICANT CHANGE UP
CREAT SERPL-MCNC: 1.27 MG/DL — SIGNIFICANT CHANGE UP (ref 0.5–1.3)
DIFF PNL FLD: ABNORMAL
GLUCOSE SERPL-MCNC: 177 MG/DL — HIGH (ref 70–99)
GLUCOSE UR QL: NEGATIVE — SIGNIFICANT CHANGE UP
HCT VFR BLD CALC: 29.4 % — LOW (ref 34.5–45)
HGB BLD-MCNC: 10.4 G/DL — LOW (ref 11.5–15.5)
KETONES UR-MCNC: NEGATIVE — SIGNIFICANT CHANGE UP
LEUKOCYTE ESTERASE UR-ACNC: ABNORMAL
MCHC RBC-ENTMCNC: 30.1 PG — SIGNIFICANT CHANGE UP (ref 27–34)
MCHC RBC-ENTMCNC: 35.5 GM/DL — SIGNIFICANT CHANGE UP (ref 32–36)
MCV RBC AUTO: 84.6 FL — SIGNIFICANT CHANGE UP (ref 80–100)
NITRITE UR-MCNC: NEGATIVE — SIGNIFICANT CHANGE UP
PH UR: 6 — SIGNIFICANT CHANGE UP (ref 5–8)
PLATELET # BLD AUTO: 116 K/UL — LOW (ref 150–400)
POTASSIUM SERPL-MCNC: 4.3 MMOL/L — SIGNIFICANT CHANGE UP (ref 3.5–5.3)
POTASSIUM SERPL-SCNC: 4.3 MMOL/L — SIGNIFICANT CHANGE UP (ref 3.5–5.3)
PROT UR-MCNC: NEGATIVE — SIGNIFICANT CHANGE UP
RBC # BLD: 3.47 M/UL — LOW (ref 3.8–5.2)
RBC # FLD: 12.6 % — SIGNIFICANT CHANGE UP (ref 10.3–14.5)
SODIUM SERPL-SCNC: 134 MMOL/L — LOW (ref 135–145)
SP GR SPEC: 1.01 — SIGNIFICANT CHANGE UP (ref 1.01–1.02)
UROBILINOGEN FLD QL: NEGATIVE — SIGNIFICANT CHANGE UP
WBC # BLD: 11.4 K/UL — HIGH (ref 3.8–10.5)
WBC # FLD AUTO: 11.4 K/UL — HIGH (ref 3.8–10.5)

## 2017-09-15 PROCEDURE — 71010: CPT | Mod: 26

## 2017-09-15 RX ORDER — FOLIC ACID 0.8 MG
1 TABLET ORAL
Qty: 30 | Refills: 0 | OUTPATIENT
Start: 2017-09-15 | End: 2017-10-15

## 2017-09-15 RX ORDER — DOCUSATE SODIUM 100 MG
1 CAPSULE ORAL
Qty: 60 | Refills: 0 | OUTPATIENT
Start: 2017-09-15 | End: 2017-10-15

## 2017-09-15 RX ORDER — ENOXAPARIN SODIUM 100 MG/ML
40 INJECTION SUBCUTANEOUS
Qty: 12 | Refills: 0 | OUTPATIENT
Start: 2017-09-15 | End: 2017-09-27

## 2017-09-15 RX ORDER — ACETAMINOPHEN 500 MG
1000 TABLET ORAL ONCE
Qty: 0 | Refills: 0 | Status: COMPLETED | OUTPATIENT
Start: 2017-09-15 | End: 2017-09-15

## 2017-09-15 RX ORDER — ASCORBIC ACID 60 MG
1 TABLET,CHEWABLE ORAL
Qty: 30 | Refills: 0 | OUTPATIENT
Start: 2017-09-15 | End: 2017-10-15

## 2017-09-15 RX ORDER — FERROUS SULFATE 325(65) MG
1 TABLET ORAL
Qty: 60 | Refills: 0 | OUTPATIENT
Start: 2017-09-15 | End: 2017-10-15

## 2017-09-15 RX ORDER — TRAMADOL HYDROCHLORIDE 50 MG/1
1 TABLET ORAL
Qty: 42 | Refills: 0 | OUTPATIENT
Start: 2017-09-15 | End: 2017-09-22

## 2017-09-15 RX ADMIN — Medication 2: at 17:50

## 2017-09-15 RX ADMIN — Medication 100 MILLIGRAM(S): at 21:20

## 2017-09-15 RX ADMIN — ENOXAPARIN SODIUM 30 MILLIGRAM(S): 100 INJECTION SUBCUTANEOUS at 09:15

## 2017-09-15 RX ADMIN — ATORVASTATIN CALCIUM 40 MILLIGRAM(S): 80 TABLET, FILM COATED ORAL at 21:20

## 2017-09-15 RX ADMIN — CARVEDILOL PHOSPHATE 12.5 MILLIGRAM(S): 80 CAPSULE, EXTENDED RELEASE ORAL at 17:50

## 2017-09-15 RX ADMIN — Medication 1000 MILLIGRAM(S): at 14:00

## 2017-09-15 RX ADMIN — TRAMADOL HYDROCHLORIDE 50 MILLIGRAM(S): 50 TABLET ORAL at 09:14

## 2017-09-15 RX ADMIN — TRAMADOL HYDROCHLORIDE 50 MILLIGRAM(S): 50 TABLET ORAL at 17:50

## 2017-09-15 RX ADMIN — Medication 100 MILLIGRAM(S): at 13:54

## 2017-09-15 RX ADMIN — Medication 500 MILLIGRAM(S): at 05:53

## 2017-09-15 RX ADMIN — Medication 1 MILLIGRAM(S): at 12:06

## 2017-09-15 RX ADMIN — Medication 650 MILLIGRAM(S): at 19:44

## 2017-09-15 RX ADMIN — Medication 325 MILLIGRAM(S): at 17:50

## 2017-09-15 RX ADMIN — LISINOPRIL 40 MILLIGRAM(S): 2.5 TABLET ORAL at 05:53

## 2017-09-15 RX ADMIN — Medication 500 MILLIGRAM(S): at 17:49

## 2017-09-15 RX ADMIN — AMLODIPINE BESYLATE 10 MILLIGRAM(S): 2.5 TABLET ORAL at 05:53

## 2017-09-15 RX ADMIN — Medication 650 MILLIGRAM(S): at 05:52

## 2017-09-15 RX ADMIN — ENOXAPARIN SODIUM 30 MILLIGRAM(S): 100 INJECTION SUBCUTANEOUS at 21:20

## 2017-09-15 RX ADMIN — Medication 400 MILLIGRAM(S): at 13:45

## 2017-09-15 RX ADMIN — Medication 325 MILLIGRAM(S): at 08:26

## 2017-09-15 RX ADMIN — Medication 4: at 12:05

## 2017-09-15 RX ADMIN — TRAMADOL HYDROCHLORIDE 50 MILLIGRAM(S): 50 TABLET ORAL at 10:00

## 2017-09-15 RX ADMIN — Medication 325 MILLIGRAM(S): at 12:05

## 2017-09-15 RX ADMIN — Medication 2: at 08:26

## 2017-09-15 RX ADMIN — TRAMADOL HYDROCHLORIDE 50 MILLIGRAM(S): 50 TABLET ORAL at 18:45

## 2017-09-15 RX ADMIN — Medication 100 MILLIGRAM(S): at 05:53

## 2017-09-15 RX ADMIN — CARVEDILOL PHOSPHATE 12.5 MILLIGRAM(S): 80 CAPSULE, EXTENDED RELEASE ORAL at 05:53

## 2017-09-15 NOTE — DISCHARGE NOTE ADULT - CARE PROVIDER_API CALL
Oswald Vera (MD), Orthopaedic Surgery; Sports Medicine  [address] [facility] [facility]  [place] [address]  Phone: [phone]  Fax: [phone]

## 2017-09-15 NOTE — DISCHARGE NOTE ADULT - PATIENT PORTAL LINK FT
“You can access the [employer] Patient Portal, offered by [facility], by registering with the following website: [email or web address]”

## 2017-09-15 NOTE — DISCHARGE NOTE ADULT - CARE PLAN
Principal Discharge DX:	Primary osteoarthritis of right knee  Goal:	Right total knee replacement  Instructions for follow-up, activity and diet:	Pain Management- See Attached Medication Reconciliation    **StretchStrap Stretching exercises for Total knee replacement***  Weight Bearing Status: _WBAT of RLE  Equipment needs: Commode, Walker  Incision Site: REMOVE STAPLES on 9/27/17  STAPLES TO BE REMOVED BY NURSE  NURSE TO APPLY STERI-STRIPS TO THE WOUND AFTER STAPLE REMOVAL   Dvt prophylaxis: D/C LOVENOX on 9/27/17  PT/Occupational Therapy are Activities of Daily Living as appropriate  Follow up with Orthopedic Surgeon after STAPLES ARE REMOVED at:[phone]  Secondary Diagnosis:	Diabetes mellitus, type 2  Secondary Diagnosis:	Gout  Secondary Diagnosis:	Hypertension Principal Discharge DX:	Primary osteoarthritis of right knee  Goal:	Right total knee replacement. improve rom  Instructions for follow-up, activity and diet:	Pain Management- See Attached Medication Reconciliation    **StretchStrap Stretching exercises for Total knee replacement***  Weight Bearing Status: _WBAT of RLE  Equipment needs: Yola, Walker  Incision Site: REMOVE STAPLES on 9/27/17  STAPLES TO BE REMOVED BY NURSE  NURSE TO APPLY STERI-STRIPS TO THE WOUND AFTER STAPLE REMOVAL   Dvt prophylaxis: D/C LOVENOX on 9/27/17  PT/Occupational Therapy are Activities of Daily Living as appropriate  Follow up with Orthopedic Surgeon after STAPLES ARE REMOVED at:[phone]  Secondary Diagnosis:	Diabetes mellitus, type 2  Secondary Diagnosis:	Gout  Secondary Diagnosis:	Hypertension

## 2017-09-15 NOTE — PROGRESS NOTE ADULT - SUBJECTIVE AND OBJECTIVE BOX
72yFemale    Diagnosis:  S/p R Total Knee Replacement POD#3    Patient was seen and evaluated at bedside. Patient with no acute complaints.   Pain is well controlled.  Denies CP/SOB, dyspnea, paresthesias, N/V/D, palpitations.     Vital Signs Last 24 Hrs  T(C): 37.4 (15 Sep 2017 06:29), Max: 38.2 (14 Sep 2017 20:00)  T(F): 99.4 (15 Sep 2017 06:29), Max: 100.7 (14 Sep 2017 20:00)  HR: 92 (15 Sep 2017 06:29) (64 - 92)  BP: 140/46 (15 Sep 2017 06:29) (123/50 - 166/61)  BP(mean): --  RR: 16 (15 Sep 2017 06:29) (15 - 16)  SpO2: 99% (15 Sep 2017 06:29) (96% - 100%)  I&O's Detail    14 Sep 2017 07:01  -  15 Sep 2017 07:00  --------------------------------------------------------  IN:    Packed Red Blood Cells: 350 mL  Total IN: 350 mL    OUT:  Total OUT: 0 mL    Total NET: 350 mL          Physical Exam:    General: AAOx3, NAD, resting comfortably in bed.    R KNEE:  Dressing stained with blood, changed during exam. Skin is pink and warm. Staples intact. No erythema. SILT.  Lower extremity:  No calf tenderness, calves are soft. 2+pulses. NVI. 5/5 Strength of EHL/TA/gastrocnemius B/L.  Good capillary refill.                           10.4   11.4  )-----------( 116      ( 15 Sep 2017 07:25 )             29.4     09-15    134<L>  |  99  |  36<H>  ----------------------------<  177<H>  4.3   |  27  |  1.27    Ca    8.9      15 Sep 2017 07:25        Impression:  72yFemale S/p R Total Knee Replacement POD#3  Plan:  -  Pain management  -  Dvt prophylaxis with Lovenox  -  Daily Physical Theapy:  WBAT  -  Continue with heel bump when laying in bed  -  Discharge planning: Home Vs. Rehab pending Physical therapy eval.  -  Dressing changed  -  CXR, U/A for post-op fever

## 2017-09-15 NOTE — DISCHARGE NOTE ADULT - MEDICATION SUMMARY - MEDICATIONS TO TAKE
I will START or STAY ON the medications listed below when I get home from the hospital:    Ultram 50 mg oral tablet  -- 1 tab(s) by mouth every 4 hours MDD:6 tabs  -- Caution federal law prohibits the transfer of this drug to any person other  than the person for whom it was prescribed.  May cause drowsiness.  Alcohol may intensify this effect.  Use care when operating dangerous machinery.  Obtain medical advice before taking any non-prescription drugs as some may affect the action of this medication.    -- Indication: For Pain    Lovenox 40 mg/0.4 mL injectable solution  -- 40 milligram(s) injectable once a day   -- It is very important that you take or use this exactly as directed.  Do not skip doses or discontinue unless directed by your doctor.    -- Indication: For Dvt prophylaxis    glimepiride 1 mg oral tablet  -- 1 tab(s) by mouth once a day  -- Indication: For As before    colchicine 0.6 mg oral capsule  -- 1 cap(s) by mouth once a day, As Needed  -- Indication: For As before    atorvastatin 40 mg oral tablet  -- 1 tab(s) by mouth once a day (at bedtime)  -- Indication: For As before    amlodipine-benazepril 10 mg-40 mg oral capsule  -- 1 cap(s) by mouth once a day  -- Indication: For As before    carvedilol 12.5 mg oral tablet  -- 1 tab(s) by mouth 2 times a day  -- Indication: For As before    ferrous sulfate 325 mg (65 mg elemental iron) oral tablet  -- 1 tab(s) by mouth 2 times a day   -- Check with your doctor before becoming pregnant.  Do not chew, break, or crush.  May discolor urine or feces.    -- Indication: For Anemia    Colace 100 mg oral capsule  -- 1 cap(s) by mouth 2 times a day   -- Medication should be taken with plenty of water.    -- Indication: For Constipation    Vitamin C 500 mg oral tablet  -- 1 tab(s) by mouth once a day   -- Indication: For Supplement    folic acid 1 mg oral tablet  -- 1 tab(s) by mouth once a day   -- Indication: For Supplement I will START or STAY ON the medications listed below when I get home from the hospital:    Ultram 50 mg oral tablet  -- 1 tab(s) by mouth every 4 hours MDD:6 tabs  -- Caution federal law prohibits the transfer of this drug to any person other  than the person for whom it was prescribed.  May cause drowsiness.  Alcohol may intensify this effect.  Use care when operating dangerous machinery.  Obtain medical advice before taking any non-prescription drugs as some may affect the action of this medication.    -- Indication: For Pain    Lovenox 40 mg/0.4 mL injectable solution  -- 40 milligram(s) injectable once a day  daily as directed x 12 days MDD:1   -- It is very important that you take or use this exactly as directed.  Do not skip doses or discontinue unless directed by your doctor.    -- Indication: For Dvt ppx    glimepiride 1 mg oral tablet  -- 1 tab(s) by mouth once a day  -- Indication: For As before    colchicine 0.6 mg oral capsule  -- 1 cap(s) by mouth once a day, As Needed  -- Indication: For As before    atorvastatin 40 mg oral tablet  -- 1 tab(s) by mouth once a day (at bedtime)  -- Indication: For As before    amlodipine-benazepril 10 mg-40 mg oral capsule  -- 1 cap(s) by mouth once a day  -- Indication: For As before    carvedilol 12.5 mg oral tablet  -- 1 tab(s) by mouth 2 times a day  -- Indication: For As before    ferrous sulfate 325 mg (65 mg elemental iron) oral tablet  -- 1 tab(s) by mouth 2 times a day   -- Check with your doctor before becoming pregnant.  Do not chew, break, or crush.  May discolor urine or feces.    -- Indication: For Anemia    Colace 100 mg oral capsule  -- 1 cap(s) by mouth 2 times a day   -- Medication should be taken with plenty of water.    -- Indication: For Constipation    Vitamin C 500 mg oral tablet  -- 1 tab(s) by mouth once a day   -- Indication: For Supplement    folic acid 1 mg oral tablet  -- 1 tab(s) by mouth once a day   -- Indication: For Supplement

## 2017-09-15 NOTE — DISCHARGE NOTE ADULT - OTHER SIGNIFICANT FINDINGS
Pain Management- See Attached Medication Reconciliation    **StretchStrap Stretching exercises for Total knee replacement***  Weight Bearing Status: WBAT of the RLE with walker  Equipment needs: Yola, Walker  Dressing: Please keep bandage/dressing Clean, Dry, and Intact.  Incision Site: REMOVE STAPLES on 9/27/2017  STAPLES TO BE REMOVED BY NURSE  NURSE TO APPLY STERI-STRIPS TO THE WOUND AFTER STAPLE REMOVAL   Dvt prophylaxis: D/C LOVENOX on 9/27/17  PT/Occupational Therapy are Activities of Daily Living as appropriate  Follow up with Orthopedic Surgeon after STAPLES ARE REMOVED at: DR SAMANIEGO at [phone]_

## 2017-09-15 NOTE — DISCHARGE NOTE ADULT - PLAN OF CARE
Right total knee replacement Pain Management- See Attached Medication Reconciliation    **StretchStrap Stretching exercises for Total knee replacement***  Weight Bearing Status: _WBAT of RLE  Equipment needs: Commode, Walker  Incision Site: REMOVE STAPLES on 9/27/17  STAPLES TO BE REMOVED BY NURSE  NURSE TO APPLY STERI-STRIPS TO THE WOUND AFTER STAPLE REMOVAL   Dvt prophylaxis: D/C LOVENOX on 9/27/17  PT/Occupational Therapy are Activities of Daily Living as appropriate  Follow up with Orthopedic Surgeon after STAPLES ARE REMOVED at:[phone] Right total knee replacement. improve rom

## 2017-09-16 VITALS
OXYGEN SATURATION: 97 % | RESPIRATION RATE: 17 BRPM | DIASTOLIC BLOOD PRESSURE: 55 MMHG | TEMPERATURE: 99 F | HEART RATE: 73 BPM | SYSTOLIC BLOOD PRESSURE: 132 MMHG

## 2017-09-16 PROCEDURE — 86850 RBC ANTIBODY SCREEN: CPT

## 2017-09-16 PROCEDURE — 73562 X-RAY EXAM OF KNEE 3: CPT

## 2017-09-16 PROCEDURE — 80048 BASIC METABOLIC PNL TOTAL CA: CPT

## 2017-09-16 PROCEDURE — 86900 BLOOD TYPING SEROLOGIC ABO: CPT

## 2017-09-16 PROCEDURE — 86920 COMPATIBILITY TEST SPIN: CPT

## 2017-09-16 PROCEDURE — 81001 URINALYSIS AUTO W/SCOPE: CPT

## 2017-09-16 PROCEDURE — 88305 TISSUE EXAM BY PATHOLOGIST: CPT

## 2017-09-16 PROCEDURE — P9040: CPT

## 2017-09-16 PROCEDURE — C1713: CPT

## 2017-09-16 PROCEDURE — 97116 GAIT TRAINING THERAPY: CPT

## 2017-09-16 PROCEDURE — 71045 X-RAY EXAM CHEST 1 VIEW: CPT

## 2017-09-16 PROCEDURE — 83036 HEMOGLOBIN GLYCOSYLATED A1C: CPT

## 2017-09-16 PROCEDURE — 86901 BLOOD TYPING SEROLOGIC RH(D): CPT

## 2017-09-16 PROCEDURE — 97110 THERAPEUTIC EXERCISES: CPT

## 2017-09-16 PROCEDURE — 97161 PT EVAL LOW COMPLEX 20 MIN: CPT

## 2017-09-16 PROCEDURE — 97530 THERAPEUTIC ACTIVITIES: CPT

## 2017-09-16 PROCEDURE — 36430 TRANSFUSION BLD/BLD COMPNT: CPT

## 2017-09-16 PROCEDURE — 85027 COMPLETE CBC AUTOMATED: CPT

## 2017-09-16 PROCEDURE — 88311 DECALCIFY TISSUE: CPT

## 2017-09-16 PROCEDURE — C1776: CPT

## 2017-09-16 RX ORDER — ENOXAPARIN SODIUM 100 MG/ML
40 INJECTION SUBCUTANEOUS
Qty: 12 | Refills: 0 | OUTPATIENT
Start: 2017-09-16 | End: 2017-09-28

## 2017-09-16 RX ORDER — ENOXAPARIN SODIUM 100 MG/ML
40 INJECTION SUBCUTANEOUS
Qty: 11 | Refills: 0 | OUTPATIENT
Start: 2017-09-16 | End: 2017-09-28

## 2017-09-16 RX ADMIN — Medication 2: at 07:43

## 2017-09-16 RX ADMIN — Medication 325 MILLIGRAM(S): at 07:43

## 2017-09-16 RX ADMIN — Medication 100 MILLIGRAM(S): at 06:14

## 2017-09-16 RX ADMIN — TRAMADOL HYDROCHLORIDE 50 MILLIGRAM(S): 50 TABLET ORAL at 04:43

## 2017-09-16 RX ADMIN — CARVEDILOL PHOSPHATE 12.5 MILLIGRAM(S): 80 CAPSULE, EXTENDED RELEASE ORAL at 06:14

## 2017-09-16 RX ADMIN — ENOXAPARIN SODIUM 30 MILLIGRAM(S): 100 INJECTION SUBCUTANEOUS at 10:48

## 2017-09-16 RX ADMIN — Medication 500 MILLIGRAM(S): at 06:14

## 2017-09-16 RX ADMIN — TRAMADOL HYDROCHLORIDE 50 MILLIGRAM(S): 50 TABLET ORAL at 03:54

## 2017-09-16 RX ADMIN — LISINOPRIL 40 MILLIGRAM(S): 2.5 TABLET ORAL at 06:14

## 2017-09-16 RX ADMIN — AMLODIPINE BESYLATE 10 MILLIGRAM(S): 2.5 TABLET ORAL at 06:14

## 2017-09-16 NOTE — PROGRESS NOTE ADULT - SUBJECTIVE AND OBJECTIVE BOX
72yFemale    Diagnosis:  S/p Right Total Knee Replacement POD#4    Patient was seen and evaluated at bedside. Patient with no acute complaints.   Pain is  well controlled.  Awaiting PT for ambulation.     Denies CP/SOB, dyspnea, paresthesias, N/V/D, palpitations.     Vital Signs Last 24 Hrs  T(C): 37.3 (16 Sep 2017 05:18), Max: 37.6 (15 Sep 2017 22:43)  T(F): 99.2 (16 Sep 2017 05:18), Max: 99.6 (15 Sep 2017 22:43)  HR: 73 (16 Sep 2017 05:18) (71 - 78)  BP: 132/55 (16 Sep 2017 05:18) (117/50 - 146/54)  BP(mean): --  RR: 17 (16 Sep 2017 05:18) (16 - 18)  SpO2: 97% (16 Sep 2017 05:18) (94% - 100%)  I&O's Detail      Physical Exam:    General: AAOx3, NAD, resting comfortably in bed.    Right knee:  Dressing removed-> Wound is clean, dry, with staples intact. No erythema. Skin is pink and warm. SILT.  No drainage.   Lower extremities:  No calf tenderness, calves are soft. 2+pulses. NVI. 5/5 Strength of EHL/TA/gastrocnemius B/L.  Good capillary refill. SILT.                          10.4   11.4  )-----------( 116      ( 15 Sep 2017 07:25 )             29.4     09-15    134<L>  |  99  |  36<H>  ----------------------------<  177<H>  4.3   |  27  |  1.27    Ca    8.9      15 Sep 2017 07:25        Impression:  71yo Female S/p Right Total Knee Replacement POD#4  Plan:  -  Pain management  -  Dvt prophylaxis with Lovenox  -  Daily Physical Therapy:  WBAT of the right lower extremity with walker  -  Discharge planning: Home today  -  Case d/w Dr. Vera  -  Dressing changed today, new dressing applied.  -  Incentive spirometry encouraged.

## 2017-09-19 DIAGNOSIS — N18.9 CHRONIC KIDNEY DISEASE, UNSPECIFIED: ICD-10-CM

## 2017-09-19 DIAGNOSIS — M79.9 SOFT TISSUE DISORDER, UNSPECIFIED: ICD-10-CM

## 2017-09-19 DIAGNOSIS — M10.9 GOUT, UNSPECIFIED: ICD-10-CM

## 2017-09-19 DIAGNOSIS — M17.11 UNILATERAL PRIMARY OSTEOARTHRITIS, RIGHT KNEE: ICD-10-CM

## 2017-09-19 DIAGNOSIS — I12.9 HYPERTENSIVE CHRONIC KIDNEY DISEASE WITH STAGE 1 THROUGH STAGE 4 CHRONIC KIDNEY DISEASE, OR UNSPECIFIED CHRONIC KIDNEY DISEASE: ICD-10-CM

## 2017-09-19 DIAGNOSIS — E11.22 TYPE 2 DIABETES MELLITUS WITH DIABETIC CHRONIC KIDNEY DISEASE: ICD-10-CM

## 2017-09-19 DIAGNOSIS — D62 ACUTE POSTHEMORRHAGIC ANEMIA: ICD-10-CM

## 2017-09-19 DIAGNOSIS — M89.8X6 OTHER SPECIFIED DISORDERS OF BONE, LOWER LEG: ICD-10-CM

## 2018-07-16 PROBLEM — H26.9 UNSPECIFIED CATARACT: Chronic | Status: ACTIVE | Noted: 2017-09-06

## 2018-07-16 PROBLEM — G56.02 CARPAL TUNNEL SYNDROME, LEFT UPPER LIMB: Chronic | Status: INACTIVE | Noted: 2017-09-06 | Resolved: 2017-09-06

## 2018-07-16 PROBLEM — N83.209 UNSPECIFIED OVARIAN CYST, UNSPECIFIED SIDE: Chronic | Status: ACTIVE | Noted: 2017-09-06

## 2018-08-22 PROBLEM — I10 ESSENTIAL (PRIMARY) HYPERTENSION: Chronic | Status: ACTIVE | Noted: 2017-09-06

## 2018-08-22 PROBLEM — M17.12 UNILATERAL PRIMARY OSTEOARTHRITIS, LEFT KNEE: Chronic | Status: ACTIVE | Noted: 2017-09-06

## 2018-08-22 PROBLEM — N18.9 CHRONIC KIDNEY DISEASE, UNSPECIFIED: Chronic | Status: ACTIVE | Noted: 2017-09-06

## 2018-08-22 PROBLEM — E11.9 TYPE 2 DIABETES MELLITUS WITHOUT COMPLICATIONS: Chronic | Status: ACTIVE | Noted: 2017-09-06

## 2018-08-22 PROBLEM — M17.11 UNILATERAL PRIMARY OSTEOARTHRITIS, RIGHT KNEE: Chronic | Status: ACTIVE | Noted: 2017-09-06

## 2018-08-22 PROBLEM — G56.02 CARPAL TUNNEL SYNDROME, LEFT UPPER LIMB: Chronic | Status: ACTIVE | Noted: 2017-09-06

## 2018-08-22 PROBLEM — M10.9 GOUT, UNSPECIFIED: Chronic | Status: ACTIVE | Noted: 2017-09-06

## 2018-08-23 ENCOUNTER — APPOINTMENT (OUTPATIENT)
Dept: SURGERY | Facility: CLINIC | Age: 73
End: 2018-08-23
Payer: MEDICARE

## 2018-08-23 VITALS
SYSTOLIC BLOOD PRESSURE: 169 MMHG | HEART RATE: 66 BPM | HEIGHT: 66 IN | BODY MASS INDEX: 27.48 KG/M2 | TEMPERATURE: 98 F | DIASTOLIC BLOOD PRESSURE: 74 MMHG | WEIGHT: 171 LBS

## 2018-08-23 DIAGNOSIS — R92.8 OTHER ABNORMAL AND INCONCLUSIVE FINDINGS ON DIAGNOSTIC IMAGING OF BREAST: ICD-10-CM

## 2018-08-23 PROCEDURE — 99204 OFFICE O/P NEW MOD 45 MIN: CPT

## 2018-08-23 RX ORDER — AMLODIPINE BESYLATE AND BENAZEPRIL HYDROCHLORIDE 10; 40 MG/1; MG/1
10-40 CAPSULE ORAL
Refills: 0 | Status: ACTIVE | COMMUNITY

## 2018-08-23 RX ORDER — CARVEDILOL 12.5 MG/1
12.5 TABLET, FILM COATED ORAL
Refills: 0 | Status: ACTIVE | COMMUNITY

## 2018-08-23 RX ORDER — COLCHICINE 0.6 MG/1
0.6 TABLET ORAL
Refills: 0 | Status: ACTIVE | COMMUNITY

## 2018-08-23 RX ORDER — GLIMEPIRIDE 1 MG/1
1 TABLET ORAL
Refills: 0 | Status: ACTIVE | COMMUNITY

## 2018-08-23 RX ORDER — ACETAMINOPHEN 325 MG/1
TABLET, FILM COATED ORAL
Refills: 0 | Status: ACTIVE | COMMUNITY

## 2018-09-11 ENCOUNTER — RESULT REVIEW (OUTPATIENT)
Age: 73
End: 2018-09-11

## 2018-09-20 ENCOUNTER — APPOINTMENT (OUTPATIENT)
Dept: SURGERY | Facility: CLINIC | Age: 73
End: 2018-09-20
Payer: MEDICARE

## 2018-09-20 VITALS
SYSTOLIC BLOOD PRESSURE: 183 MMHG | WEIGHT: 171 LBS | DIASTOLIC BLOOD PRESSURE: 79 MMHG | HEART RATE: 69 BPM | TEMPERATURE: 98.7 F | OXYGEN SATURATION: 99 % | BODY MASS INDEX: 27.48 KG/M2 | HEIGHT: 66 IN

## 2018-09-20 PROCEDURE — 99215 OFFICE O/P EST HI 40 MIN: CPT

## 2018-10-10 ENCOUNTER — OUTPATIENT (OUTPATIENT)
Dept: OUTPATIENT SERVICES | Facility: HOSPITAL | Age: 73
LOS: 1 days | End: 2018-10-10
Payer: COMMERCIAL

## 2018-10-10 VITALS
TEMPERATURE: 99 F | HEART RATE: 78 BPM | SYSTOLIC BLOOD PRESSURE: 130 MMHG | RESPIRATION RATE: 16 BRPM | WEIGHT: 171.96 LBS | HEIGHT: 66 IN | OXYGEN SATURATION: 98 % | DIASTOLIC BLOOD PRESSURE: 70 MMHG

## 2018-10-10 DIAGNOSIS — D17.0 BENIGN LIPOMATOUS NEOPLASM OF SKIN AND SUBCUTANEOUS TISSUE OF HEAD, FACE AND NECK: Chronic | ICD-10-CM

## 2018-10-10 DIAGNOSIS — D05.12 INTRADUCTAL CARCINOMA IN SITU OF LEFT BREAST: ICD-10-CM

## 2018-10-10 DIAGNOSIS — Z01.818 ENCOUNTER FOR OTHER PREPROCEDURAL EXAMINATION: ICD-10-CM

## 2018-10-10 DIAGNOSIS — Z90.49 ACQUIRED ABSENCE OF OTHER SPECIFIED PARTS OF DIGESTIVE TRACT: Chronic | ICD-10-CM

## 2018-10-10 DIAGNOSIS — C50.911 MALIGNANT NEOPLASM OF UNSPECIFIED SITE OF RIGHT FEMALE BREAST: ICD-10-CM

## 2018-10-10 DIAGNOSIS — Z98.890 OTHER SPECIFIED POSTPROCEDURAL STATES: Chronic | ICD-10-CM

## 2018-10-10 DIAGNOSIS — Z96.651 PRESENCE OF RIGHT ARTIFICIAL KNEE JOINT: Chronic | ICD-10-CM

## 2018-10-10 DIAGNOSIS — Z90.721 ACQUIRED ABSENCE OF OVARIES, UNILATERAL: Chronic | ICD-10-CM

## 2018-10-10 DIAGNOSIS — D17.39 BENIGN LIPOMATOUS NEOPLASM OF SKIN AND SUBCUTANEOUS TISSUE OF OTHER SITES: Chronic | ICD-10-CM

## 2018-10-10 PROCEDURE — G0463: CPT

## 2018-10-10 RX ORDER — CARVEDILOL PHOSPHATE 80 MG/1
1 CAPSULE, EXTENDED RELEASE ORAL
Qty: 0 | Refills: 0 | COMMUNITY

## 2018-10-10 RX ORDER — ATORVASTATIN CALCIUM 80 MG/1
1 TABLET, FILM COATED ORAL
Qty: 0 | Refills: 0 | COMMUNITY

## 2018-10-10 RX ORDER — SODIUM CHLORIDE 9 MG/ML
3 INJECTION INTRAMUSCULAR; INTRAVENOUS; SUBCUTANEOUS EVERY 8 HOURS
Qty: 0 | Refills: 0 | Status: DISCONTINUED | OUTPATIENT
Start: 2018-10-17 | End: 2018-10-25

## 2018-10-10 NOTE — H&P PST ADULT - PMH
Carpal tunnel syndrome on left    Cataract  bilateral eyes  CKD (chronic kidney disease)    Diabetes mellitus, type 2    Gout    Hypertension    Intraductal carcinoma in situ of left breast    Malignant neoplasm of right breast    Ovarian cyst  right  Primary osteoarthritis of left knee    Primary osteoarthritis of right knee    Spinal stenosis of lumbosacral region Carpal tunnel syndrome on left    Cataract  bilateral eyes  CKD (chronic kidney disease)    CKD (chronic kidney disease) stage 3, GFR 30-59 ml/min    Diabetes mellitus, type 2    Gout    Hypertension    Intraductal carcinoma in situ of left breast    Malignant neoplasm of right breast    Ovarian cyst  right  Primary osteoarthritis of left knee    Primary osteoarthritis of right knee    Spinal stenosis of lumbosacral region

## 2018-10-10 NOTE — H&P PST ADULT - NEGATIVE GASTROINTESTINAL SYMPTOMS
no flatulence/no abdominal pain/no diarrhea/no constipation/no change in bowel habits/no nausea/no vomiting

## 2018-10-10 NOTE — H&P PST ADULT - PSH
History of right salpingo-oophorectomy  1994  Lipoma of chest wall  sx removal  Lipoma of neck  sx removal  S/P appendectomy  1994  S/P carpal tunnel release  left hand- 2007  S/P total knee arthroplasty, right  9/12/18

## 2018-10-10 NOTE — H&P PST ADULT - MUSCULOSKELETAL
details… detailed exam left knee/no joint swelling/no joint erythema/no joint warmth/no calf tenderness/decreased ROM due to pain

## 2018-10-10 NOTE — H&P PST ADULT - HISTORY OF PRESENT ILLNESS
73 years old female presented with right breast palpable mass found upon mammogram in August 2018, pt also had sonogram and they also found left breast intraductal carcinoma in situ , then pt had bx and was dx with malignant neoplasm of right female breast and intraductal carcinoma in situ of left breast. Patient is scheduled for right breast lumpectomy and right axillary sentinel node bx and left breast lumpectomy with left breast needle localization on 10/17/28.

## 2018-10-10 NOTE — H&P PST ADULT - ASSESSMENT
73 years old female presented with  malignant neoplasm of right female breast and intraductal carcinoma in situ of left breast.

## 2018-10-10 NOTE — H&P PST ADULT - RS GEN PE MLT RESP DETAILS PC
good air movement/breath sounds equal/respirations non-labored/normal/airway patent/no rales/no rhonchi/no wheezes/clear to auscultation bilaterally

## 2018-10-10 NOTE — H&P PST ADULT - VENOUS THROMBOEMBOLISM CURRENT STATUS
(2) malignancy (present or previous)/(1) other risk factor (includes escalating BMI, pack-years of smoking, diabetes requiring insulin, chemotherapy, female gender and length of surgery)

## 2018-10-10 NOTE — H&P PST ADULT - NEGATIVE GENERAL GENITOURINARY SYMPTOMS
no incontinence/no nocturia/no urinary hesitancy/no hematuria/no flank pain R/no renal colic/no flank pain L/no dysuria/normal urinary frequency

## 2018-10-10 NOTE — H&P PST ADULT - DOES PATIENT HAVE ADVANCE DIRECTIVE
in  of emergency call patient's daughter Lucas Knox [phone] to make medical decisions about pt/No

## 2018-10-10 NOTE — H&P PST ADULT - PROBLEM SELECTOR PLAN 1
Patient is scheduled for right breast lumpectomy and right axillary sentinel node bx and left breast lumpectomy with left breast needle localization on 10/17/28.

## 2018-10-16 ENCOUNTER — TRANSCRIPTION ENCOUNTER (OUTPATIENT)
Age: 73
End: 2018-10-16

## 2018-10-17 ENCOUNTER — RESULT REVIEW (OUTPATIENT)
Age: 73
End: 2018-10-17

## 2018-10-17 ENCOUNTER — OUTPATIENT (OUTPATIENT)
Dept: OUTPATIENT SERVICES | Facility: HOSPITAL | Age: 73
LOS: 1 days | End: 2018-10-17
Payer: COMMERCIAL

## 2018-10-17 ENCOUNTER — APPOINTMENT (OUTPATIENT)
Dept: SURGERY | Facility: HOSPITAL | Age: 73
End: 2018-10-17

## 2018-10-17 VITALS
HEIGHT: 66 IN | TEMPERATURE: 98 F | HEART RATE: 105 BPM | DIASTOLIC BLOOD PRESSURE: 74 MMHG | OXYGEN SATURATION: 100 % | RESPIRATION RATE: 16 BRPM | SYSTOLIC BLOOD PRESSURE: 161 MMHG | WEIGHT: 171.96 LBS

## 2018-10-17 VITALS
HEART RATE: 76 BPM | TEMPERATURE: 98 F | SYSTOLIC BLOOD PRESSURE: 135 MMHG | OXYGEN SATURATION: 97 % | RESPIRATION RATE: 16 BRPM | DIASTOLIC BLOOD PRESSURE: 61 MMHG

## 2018-10-17 DIAGNOSIS — Z90.49 ACQUIRED ABSENCE OF OTHER SPECIFIED PARTS OF DIGESTIVE TRACT: Chronic | ICD-10-CM

## 2018-10-17 DIAGNOSIS — D17.0 BENIGN LIPOMATOUS NEOPLASM OF SKIN AND SUBCUTANEOUS TISSUE OF HEAD, FACE AND NECK: Chronic | ICD-10-CM

## 2018-10-17 DIAGNOSIS — C50.911 MALIGNANT NEOPLASM OF UNSPECIFIED SITE OF RIGHT FEMALE BREAST: ICD-10-CM

## 2018-10-17 DIAGNOSIS — D17.39 BENIGN LIPOMATOUS NEOPLASM OF SKIN AND SUBCUTANEOUS TISSUE OF OTHER SITES: Chronic | ICD-10-CM

## 2018-10-17 DIAGNOSIS — Z96.651 PRESENCE OF RIGHT ARTIFICIAL KNEE JOINT: Chronic | ICD-10-CM

## 2018-10-17 DIAGNOSIS — D05.12 INTRADUCTAL CARCINOMA IN SITU OF LEFT BREAST: ICD-10-CM

## 2018-10-17 DIAGNOSIS — Z98.890 OTHER SPECIFIED POSTPROCEDURAL STATES: Chronic | ICD-10-CM

## 2018-10-17 DIAGNOSIS — Z90.721 ACQUIRED ABSENCE OF OVARIES, UNILATERAL: Chronic | ICD-10-CM

## 2018-10-17 PROCEDURE — 88334 PATH CONSLTJ SURG CYTO XM EA: CPT | Mod: 26,59

## 2018-10-17 PROCEDURE — 19302 P-MASTECTOMY W/LN REMOVAL: CPT | Mod: AS,RT

## 2018-10-17 PROCEDURE — 88331 PATH CONSLTJ SURG 1 BLK 1SPC: CPT | Mod: 26

## 2018-10-17 PROCEDURE — 19301 PARTIAL MASTECTOMY: CPT | Mod: LT

## 2018-10-17 PROCEDURE — 88341 IMHCHEM/IMCYTCHM EA ADD ANTB: CPT

## 2018-10-17 PROCEDURE — 19282 PERQ DEVICE BREAST EA IMAG: CPT | Mod: RT

## 2018-10-17 PROCEDURE — 88307 TISSUE EXAM BY PATHOLOGIST: CPT

## 2018-10-17 PROCEDURE — 38900 IO MAP OF SENT LYMPH NODE: CPT | Mod: AS,59,RT

## 2018-10-17 PROCEDURE — 78195 LYMPH SYSTEM IMAGING: CPT | Mod: 26

## 2018-10-17 PROCEDURE — 76098 X-RAY EXAM SURGICAL SPECIMEN: CPT

## 2018-10-17 PROCEDURE — 76098 X-RAY EXAM SURGICAL SPECIMEN: CPT | Mod: 26,76

## 2018-10-17 PROCEDURE — 19125 EXCISION BREAST LESION: CPT | Mod: 50

## 2018-10-17 PROCEDURE — 88307 TISSUE EXAM BY PATHOLOGIST: CPT | Mod: 26

## 2018-10-17 PROCEDURE — 88342 IMHCHEM/IMCYTCHM 1ST ANTB: CPT | Mod: 26

## 2018-10-17 PROCEDURE — 38525 BIOPSY/REMOVAL LYMPH NODES: CPT | Mod: RT

## 2018-10-17 PROCEDURE — A9541: CPT

## 2018-10-17 PROCEDURE — 38900 IO MAP OF SENT LYMPH NODE: CPT | Mod: 59,RT

## 2018-10-17 PROCEDURE — 19281 PERQ DEVICE BREAST 1ST IMAG: CPT

## 2018-10-17 PROCEDURE — 88331 PATH CONSLTJ SURG 1 BLK 1SPC: CPT

## 2018-10-17 PROCEDURE — 19281 PERQ DEVICE BREAST 1ST IMAG: CPT | Mod: RT

## 2018-10-17 PROCEDURE — 19302 P-MASTECTOMY W/LN REMOVAL: CPT | Mod: RT

## 2018-10-17 PROCEDURE — 78195 LYMPH SYSTEM IMAGING: CPT

## 2018-10-17 PROCEDURE — 38792 RA TRACER ID OF SENTINL NODE: CPT | Mod: 59,RT

## 2018-10-17 PROCEDURE — 88333 PATH CONSLTJ SURG CYTO XM 1: CPT

## 2018-10-17 PROCEDURE — 38790 INJECT FOR LYMPHATIC X-RAY: CPT | Mod: 59,RT

## 2018-10-17 PROCEDURE — 19301 PARTIAL MASTECTOMY: CPT | Mod: AS,LT

## 2018-10-17 RX ORDER — ONDANSETRON 8 MG/1
4 TABLET, FILM COATED ORAL ONCE
Qty: 0 | Refills: 0 | Status: COMPLETED | OUTPATIENT
Start: 2018-10-17 | End: 2018-10-17

## 2018-10-17 RX ORDER — ACETAMINOPHEN WITH CODEINE 300MG-30MG
1 TABLET ORAL
Qty: 12 | Refills: 0 | OUTPATIENT
Start: 2018-10-17

## 2018-10-17 RX ADMIN — ONDANSETRON 4 MILLIGRAM(S): 8 TABLET, FILM COATED ORAL at 17:57

## 2018-10-17 RX ADMIN — SODIUM CHLORIDE 3 MILLILITER(S): 9 INJECTION INTRAMUSCULAR; INTRAVENOUS; SUBCUTANEOUS at 10:11

## 2018-10-17 NOTE — ASU DISCHARGE PLAN (ADULT/PEDIATRIC). - BATHING
shower only sponge only Keep dressing dry, clean, intact, for 2 days. After 2 days, remove dressing and leave steri strips on. You can shower with steri strips on.  If steri-strip falls off after shower its OK, if not you leave it there, it will fall off by itself in 2-3 days./sponge only

## 2018-10-17 NOTE — BRIEF OPERATIVE NOTE - POST-OP DX
Ductal carcinoma in situ (DCIS) of left breast  10/17/2018    Active  Licha Ford  Invasive carcinoma of breast  10/17/2018    Active  Licha Ford

## 2018-10-17 NOTE — BRIEF OPERATIVE NOTE - PROCEDURE
<<-----Click on this checkbox to enter Procedure Axillary lymph node dissection with sentinel lymph node biopsy  10/17/2018    Active  KKOTAK  Lumpectomy of both breasts after needle localization  10/17/2018    Active  KKOTAK

## 2018-10-17 NOTE — BRIEF OPERATIVE NOTE - PRE-OP DX
Ductal carcinoma in situ (DCIS) of left breast  10/17/2018    Active  Licha Ford  Invasive carcinoma of breast  10/17/2018  right  Active  Licha Ford

## 2018-10-17 NOTE — ASU PATIENT PROFILE, ADULT - PMH
Carpal tunnel syndrome on left    Cataract  bilateral eyes  CKD (chronic kidney disease)    CKD (chronic kidney disease) stage 3, GFR 30-59 ml/min    Diabetes mellitus, type 2    Gout    Hypertension    Intraductal carcinoma in situ of left breast    Malignant neoplasm of right breast    Ovarian cyst  right  Primary osteoarthritis of left knee    Primary osteoarthritis of right knee    Spinal stenosis of lumbosacral region

## 2018-10-17 NOTE — ASU DISCHARGE PLAN (ADULT/PEDIATRIC). - MEDICATION SUMMARY - MEDICATIONS TO TAKE
I will START or STAY ON the medications listed below when I get home from the hospital:    Tylenol with Codeine #3 oral tablet  -- 1 tab(s) by mouth every 4-6 hours, As Needed -for severe pain MDD:4  -- Caution federal law prohibits the transfer of this drug to any person other  than the person for whom it was prescribed.  May cause drowsiness.  Alcohol may intensify this effect.  Use care when operating dangerous machinery.  This product contains acetaminophen.  Do not use  with any other product containing acetaminophen to prevent possible liver damage.  Using more of this medication than prescribed may cause serious breathing problems.    -- Indication: For severe postoperative pain     Tylenol 500 mg oral tablet  -- 2 tab(s) by mouth every 6 hours, As Needed  -- Indication: For Do not take if taking Tylenol #3    glimepiride 1 mg oral tablet  -- 1 tab(s) by mouth once a day afternoon  -- Indication: For Diabetes    colchicine 0.6 mg oral capsule  -- 1 cap(s) by mouth once a day, As Needed  -- Indication: For Gout    amlodipine-benazepril 10 mg-40 mg oral capsule  -- 1 cap(s) by mouth once a day  -- Indication: For hypertension

## 2018-10-17 NOTE — ASU DISCHARGE PLAN (ADULT/PEDIATRIC). - NOTIFY
Excessive Diarrhea/Unable to Urinate/Bleeding that does not stop/Numbness, color, or temperature change to extremity/Numbness, tingling/Increased Irritability or Sluggishness/Persistent Nausea and Vomiting/Swelling that continues/Pain not relieved by Medications/Fever greater than 101/Inability to Tolerate Liquids or Foods Increased Irritability or Sluggishness/Unable to Urinate/Bleeding that does not stop/Persistent Nausea and Vomiting/Pain not relieved by Medications/Fever greater than 101/Swelling that continues

## 2018-10-17 NOTE — ASU DISCHARGE PLAN (ADULT/PEDIATRIC). - SPECIAL INSTRUCTIONS
Drink plenty of fluids. Rest as needed. Do not lift anything heavier than 10 pounds. You may take motrin or advil for mild pain as needed, in addition to prescribed narcotic medication. Call for any fever over 101, nausea, vomiting, severe pain, no passing of gas or bowel movement. You may shower and remove outer dressing in 48 hours. Leave white steri-strips in place and allow them to fall off on their own. Pay dry. Drink plenty of fluids. Rest as needed. Do not lift anything heavier than 10 pounds. You may take motrin or advil for mild pain as needed, in addition to prescribed narcotic medication (Tylenol #3 may cause some nausea, only take for severe pain). Call for any fever over 101, nausea, vomiting, severe pain, no passing of gas or bowel movement. You may shower and remove outer dressing in 48 hours. Leave white steri-strips in place and allow them to fall off on their own. Pay dry.

## 2018-10-17 NOTE — ASU DISCHARGE PLAN (ADULT/PEDIATRIC). - FOLLOWUP APPOINTMENT CLINIC/PHYSICIAN
Please follow up with Dr. Morales in his office on Thursday 10/25. Please call his office to schedule an appointment.

## 2018-10-17 NOTE — ASU PATIENT PROFILE, ADULT - DOES PATIENT HAVE ADVANCE DIRECTIVE
No/in  of emergency call patient's daughter Lucas Knox [phone] to make medical decisions about pt

## 2018-10-17 NOTE — ASU DISCHARGE PLAN (ADULT/PEDIATRIC). - NURSING INSTRUCTIONS
Keep dressing dry, clean, intact for 2 days. Do not get incision wet for 48 hours (2 days). After 2 days, remove dressing and leave steri strips (white tapes) on. You can shower with steri strips on. The steri strips will fall off during shower. Do not rub them off. It sometimes take 2-3 days for them to fall off. Keep follow up appointment.

## 2018-10-22 PROBLEM — C50.911 MALIGNANT NEOPLASM OF UNSPECIFIED SITE OF RIGHT FEMALE BREAST: Chronic | Status: ACTIVE | Noted: 2018-10-10

## 2018-10-22 PROBLEM — D05.12 INTRADUCTAL CARCINOMA IN SITU OF LEFT BREAST: Chronic | Status: ACTIVE | Noted: 2018-10-10

## 2018-10-22 PROBLEM — N18.3 CHRONIC KIDNEY DISEASE, STAGE 3 (MODERATE): Chronic | Status: ACTIVE | Noted: 2018-10-10

## 2018-10-23 LAB — SURGICAL PATHOLOGY STUDY: SIGNIFICANT CHANGE UP

## 2018-10-25 ENCOUNTER — APPOINTMENT (OUTPATIENT)
Dept: SURGERY | Facility: CLINIC | Age: 73
End: 2018-10-25
Payer: MEDICARE

## 2018-10-25 PROCEDURE — 99024 POSTOP FOLLOW-UP VISIT: CPT

## 2018-11-15 ENCOUNTER — APPOINTMENT (OUTPATIENT)
Dept: ENDOVASCULAR SURGERY | Facility: CLINIC | Age: 73
End: 2018-11-15
Payer: MEDICARE

## 2018-11-15 PROCEDURE — 77001 FLUOROGUIDE FOR VEIN DEVICE: CPT

## 2018-11-15 PROCEDURE — 36561 INSERT TUNNELED CV CATH: CPT | Mod: RT

## 2018-11-15 PROCEDURE — 76937 US GUIDE VASCULAR ACCESS: CPT

## 2019-02-05 ENCOUNTER — INPATIENT (INPATIENT)
Facility: HOSPITAL | Age: 74
LOS: 14 days | Discharge: HOME HEALTH | End: 2019-02-20
Attending: HOSPITALIST | Admitting: HOSPITALIST
Payer: MEDICARE

## 2019-02-05 VITALS
SYSTOLIC BLOOD PRESSURE: 148 MMHG | RESPIRATION RATE: 20 BRPM | TEMPERATURE: 103 F | HEART RATE: 124 BPM | DIASTOLIC BLOOD PRESSURE: 85 MMHG | OXYGEN SATURATION: 100 %

## 2019-02-05 DIAGNOSIS — D64.9 ANEMIA, UNSPECIFIED: ICD-10-CM

## 2019-02-05 DIAGNOSIS — A41.9 SEPSIS, UNSPECIFIED ORGANISM: ICD-10-CM

## 2019-02-05 DIAGNOSIS — I10 ESSENTIAL (PRIMARY) HYPERTENSION: ICD-10-CM

## 2019-02-05 DIAGNOSIS — D61.818 OTHER PANCYTOPENIA: ICD-10-CM

## 2019-02-05 DIAGNOSIS — D70.9 NEUTROPENIA, UNSPECIFIED: ICD-10-CM

## 2019-02-05 DIAGNOSIS — Z90.721 ACQUIRED ABSENCE OF OVARIES, UNILATERAL: Chronic | ICD-10-CM

## 2019-02-05 DIAGNOSIS — D17.0 BENIGN LIPOMATOUS NEOPLASM OF SKIN AND SUBCUTANEOUS TISSUE OF HEAD, FACE AND NECK: Chronic | ICD-10-CM

## 2019-02-05 DIAGNOSIS — R55 SYNCOPE AND COLLAPSE: ICD-10-CM

## 2019-02-05 DIAGNOSIS — C50.911 MALIGNANT NEOPLASM OF UNSPECIFIED SITE OF RIGHT FEMALE BREAST: ICD-10-CM

## 2019-02-05 DIAGNOSIS — Z96.651 PRESENCE OF RIGHT ARTIFICIAL KNEE JOINT: Chronic | ICD-10-CM

## 2019-02-05 DIAGNOSIS — R19.7 DIARRHEA, UNSPECIFIED: ICD-10-CM

## 2019-02-05 DIAGNOSIS — E11.9 TYPE 2 DIABETES MELLITUS WITHOUT COMPLICATIONS: ICD-10-CM

## 2019-02-05 DIAGNOSIS — Z90.49 ACQUIRED ABSENCE OF OTHER SPECIFIED PARTS OF DIGESTIVE TRACT: Chronic | ICD-10-CM

## 2019-02-05 DIAGNOSIS — Z98.890 OTHER SPECIFIED POSTPROCEDURAL STATES: Chronic | ICD-10-CM

## 2019-02-05 DIAGNOSIS — J18.9 PNEUMONIA, UNSPECIFIED ORGANISM: ICD-10-CM

## 2019-02-05 DIAGNOSIS — Z29.9 ENCOUNTER FOR PROPHYLACTIC MEASURES, UNSPECIFIED: ICD-10-CM

## 2019-02-05 DIAGNOSIS — N17.9 ACUTE KIDNEY FAILURE, UNSPECIFIED: ICD-10-CM

## 2019-02-05 DIAGNOSIS — D17.39 BENIGN LIPOMATOUS NEOPLASM OF SKIN AND SUBCUTANEOUS TISSUE OF OTHER SITES: Chronic | ICD-10-CM

## 2019-02-05 LAB
ALBUMIN SERPL ELPH-MCNC: 2.8 G/DL — LOW (ref 3.3–5)
ALBUMIN SERPL ELPH-MCNC: 3.9 G/DL — SIGNIFICANT CHANGE UP (ref 3.3–5)
ALP SERPL-CCNC: 43 U/L — SIGNIFICANT CHANGE UP (ref 40–120)
ALP SERPL-CCNC: 58 U/L — SIGNIFICANT CHANGE UP (ref 40–120)
ALT FLD-CCNC: 15 U/L — SIGNIFICANT CHANGE UP (ref 4–33)
ALT FLD-CCNC: 20 U/L — SIGNIFICANT CHANGE UP (ref 4–33)
ANION GAP SERPL CALC-SCNC: 14 MMO/L — SIGNIFICANT CHANGE UP (ref 7–14)
ANION GAP SERPL CALC-SCNC: 17 MMO/L — HIGH (ref 7–14)
ANISOCYTOSIS BLD QL: SLIGHT — SIGNIFICANT CHANGE UP
APTT BLD: 33.1 SEC — SIGNIFICANT CHANGE UP (ref 27.5–36.3)
AST SERPL-CCNC: 20 U/L — SIGNIFICANT CHANGE UP (ref 4–32)
AST SERPL-CCNC: 28 U/L — SIGNIFICANT CHANGE UP (ref 4–32)
B PERT DNA SPEC QL NAA+PROBE: NOT DETECTED — SIGNIFICANT CHANGE UP
BASE EXCESS BLDV CALC-SCNC: -0.3 MMOL/L — SIGNIFICANT CHANGE UP
BASE EXCESS BLDV CALC-SCNC: -1.5 MMOL/L — SIGNIFICANT CHANGE UP
BASOPHILS # BLD AUTO: 0 K/UL — SIGNIFICANT CHANGE UP (ref 0–0.2)
BASOPHILS NFR BLD AUTO: 0 % — SIGNIFICANT CHANGE UP (ref 0–2)
BASOPHILS NFR SPEC: 0 % — SIGNIFICANT CHANGE UP (ref 0–2)
BILIRUB SERPL-MCNC: 0.6 MG/DL — SIGNIFICANT CHANGE UP (ref 0.2–1.2)
BILIRUB SERPL-MCNC: 0.9 MG/DL — SIGNIFICANT CHANGE UP (ref 0.2–1.2)
BLASTS # FLD: 0 % — SIGNIFICANT CHANGE UP (ref 0–0)
BLD GP AB SCN SERPL QL: NEGATIVE — SIGNIFICANT CHANGE UP
BLOOD GAS VENOUS - CREATININE: 2.3 MG/DL — HIGH (ref 0.5–1.3)
BLOOD GAS VENOUS - CREATININE: 2.42 MG/DL — HIGH (ref 0.5–1.3)
BUN SERPL-MCNC: 27 MG/DL — HIGH (ref 7–23)
BUN SERPL-MCNC: 27 MG/DL — HIGH (ref 7–23)
C PNEUM DNA SPEC QL NAA+PROBE: NOT DETECTED — SIGNIFICANT CHANGE UP
CALCIUM SERPL-MCNC: 7.7 MG/DL — LOW (ref 8.4–10.5)
CALCIUM SERPL-MCNC: 9.2 MG/DL — SIGNIFICANT CHANGE UP (ref 8.4–10.5)
CHLORIDE BLDV-SCNC: 101 MMOL/L — SIGNIFICANT CHANGE UP (ref 96–108)
CHLORIDE BLDV-SCNC: 104 MMOL/L — SIGNIFICANT CHANGE UP (ref 96–108)
CHLORIDE SERPL-SCNC: 105 MMOL/L — SIGNIFICANT CHANGE UP (ref 98–107)
CHLORIDE SERPL-SCNC: 96 MMOL/L — LOW (ref 98–107)
CO2 SERPL-SCNC: 19 MMOL/L — LOW (ref 22–31)
CO2 SERPL-SCNC: 22 MMOL/L — SIGNIFICANT CHANGE UP (ref 22–31)
CREAT SERPL-MCNC: 1.89 MG/DL — HIGH (ref 0.5–1.3)
CREAT SERPL-MCNC: 2.35 MG/DL — HIGH (ref 0.5–1.3)
DACRYOCYTES BLD QL SMEAR: SLIGHT — SIGNIFICANT CHANGE UP
EOSINOPHIL # BLD AUTO: 0 K/UL — SIGNIFICANT CHANGE UP (ref 0–0.5)
EOSINOPHIL NFR BLD AUTO: 0 % — SIGNIFICANT CHANGE UP (ref 0–6)
EOSINOPHIL NFR FLD: 0 % — SIGNIFICANT CHANGE UP (ref 0–6)
FLUAV H1 2009 PAND RNA SPEC QL NAA+PROBE: NOT DETECTED — SIGNIFICANT CHANGE UP
FLUAV H1 RNA SPEC QL NAA+PROBE: NOT DETECTED — SIGNIFICANT CHANGE UP
FLUAV H3 RNA SPEC QL NAA+PROBE: NOT DETECTED — SIGNIFICANT CHANGE UP
FLUAV SUBTYP SPEC NAA+PROBE: NOT DETECTED — SIGNIFICANT CHANGE UP
FLUBV RNA SPEC QL NAA+PROBE: NOT DETECTED — SIGNIFICANT CHANGE UP
GAS PNL BLDV: 132 MMOL/L — LOW (ref 136–146)
GAS PNL BLDV: 133 MMOL/L — LOW (ref 136–146)
GIANT PLATELETS BLD QL SMEAR: PRESENT — SIGNIFICANT CHANGE UP
GLUCOSE BLDV-MCNC: 239 — HIGH (ref 70–99)
GLUCOSE BLDV-MCNC: 268 — HIGH (ref 70–99)
GLUCOSE SERPL-MCNC: 215 MG/DL — HIGH (ref 70–99)
GLUCOSE SERPL-MCNC: 251 MG/DL — HIGH (ref 70–99)
HADV DNA SPEC QL NAA+PROBE: NOT DETECTED — SIGNIFICANT CHANGE UP
HAPTOGLOB SERPL-MCNC: 359 MG/DL — HIGH (ref 34–200)
HBA1C BLD-MCNC: 7.6 % — HIGH (ref 4–5.6)
HCO3 BLDV-SCNC: 23 MMOL/L — SIGNIFICANT CHANGE UP (ref 20–27)
HCO3 BLDV-SCNC: 24 MMOL/L — SIGNIFICANT CHANGE UP (ref 20–27)
HCOV PNL SPEC NAA+PROBE: SIGNIFICANT CHANGE UP
HCT VFR BLD CALC: 22.7 % — LOW (ref 34.5–45)
HCT VFR BLDV CALC: 17.6 % — CRITICAL LOW (ref 34.5–45)
HCT VFR BLDV CALC: 22.8 % — LOW (ref 34.5–45)
HGB BLD-MCNC: 7.3 G/DL — LOW (ref 11.5–15.5)
HGB BLDV-MCNC: 5.6 G/DL — CRITICAL LOW (ref 11.5–15.5)
HGB BLDV-MCNC: 7.3 G/DL — LOW (ref 11.5–15.5)
HMPV RNA SPEC QL NAA+PROBE: NOT DETECTED — SIGNIFICANT CHANGE UP
HPIV1 RNA SPEC QL NAA+PROBE: NOT DETECTED — SIGNIFICANT CHANGE UP
HPIV2 RNA SPEC QL NAA+PROBE: NOT DETECTED — SIGNIFICANT CHANGE UP
HPIV3 RNA SPEC QL NAA+PROBE: NOT DETECTED — SIGNIFICANT CHANGE UP
HPIV4 RNA SPEC QL NAA+PROBE: NOT DETECTED — SIGNIFICANT CHANGE UP
IMM GRANULOCYTES NFR BLD AUTO: 0 % — SIGNIFICANT CHANGE UP (ref 0–1.5)
INR BLD: 2.04 — HIGH (ref 0.88–1.17)
LACTATE BLDV-MCNC: 2.1 MMOL/L — HIGH (ref 0.5–2)
LACTATE BLDV-MCNC: 2.9 MMOL/L — HIGH (ref 0.5–2)
LDH SERPL L TO P-CCNC: 167 U/L — SIGNIFICANT CHANGE UP (ref 135–225)
LYMPHOCYTES # BLD AUTO: 0.21 K/UL — LOW (ref 1–3.3)
LYMPHOCYTES # BLD AUTO: 60 % — HIGH (ref 13–44)
LYMPHOCYTES NFR SPEC AUTO: 62.3 % — HIGH (ref 13–44)
MAGNESIUM SERPL-MCNC: 1.4 MG/DL — LOW (ref 1.6–2.6)
MCHC RBC-ENTMCNC: 27.1 PG — SIGNIFICANT CHANGE UP (ref 27–34)
MCHC RBC-ENTMCNC: 32.2 % — SIGNIFICANT CHANGE UP (ref 32–36)
MCV RBC AUTO: 84.4 FL — SIGNIFICANT CHANGE UP (ref 80–100)
METAMYELOCYTES # FLD: 1.9 % — HIGH (ref 0–1)
MICROCYTES BLD QL: SLIGHT — SIGNIFICANT CHANGE UP
MONOCYTES # BLD AUTO: 0.13 K/UL — SIGNIFICANT CHANGE UP (ref 0–0.9)
MONOCYTES NFR BLD AUTO: 37.1 % — HIGH (ref 2–14)
MONOCYTES NFR BLD: 13.2 % — HIGH (ref 2–9)
MYELOCYTES NFR BLD: 0 % — SIGNIFICANT CHANGE UP (ref 0–0)
NEUTROPHIL AB SER-ACNC: 0 % — LOW (ref 43–77)
NEUTROPHILS # BLD AUTO: 0.01 K/UL — LOW (ref 1.8–7.4)
NEUTROPHILS NFR BLD AUTO: 2.9 % — LOW (ref 43–77)
NEUTS BAND # BLD: 0 % — SIGNIFICANT CHANGE UP (ref 0–6)
NRBC # BLD: 2 /100WBC — SIGNIFICANT CHANGE UP
NRBC # FLD: 0 K/UL — LOW (ref 25–125)
OTHER - HEMATOLOGY %: 0 — SIGNIFICANT CHANGE UP
OVALOCYTES BLD QL SMEAR: SLIGHT — SIGNIFICANT CHANGE UP
PCO2 BLDV: 34 MMHG — LOW (ref 41–51)
PCO2 BLDV: 36 MMHG — LOW (ref 41–51)
PH BLDV: 7.41 PH — SIGNIFICANT CHANGE UP (ref 7.32–7.43)
PH BLDV: 7.45 PH — HIGH (ref 7.32–7.43)
PHOSPHATE SERPL-MCNC: 3.9 MG/DL — SIGNIFICANT CHANGE UP (ref 2.5–4.5)
PLATELET # BLD AUTO: 109 K/UL — LOW (ref 150–400)
PLATELET COUNT - ESTIMATE: SIGNIFICANT CHANGE UP
PMV BLD: 10.3 FL — SIGNIFICANT CHANGE UP (ref 7–13)
PO2 BLDV: 33 MMHG — LOW (ref 35–40)
PO2 BLDV: 38 MMHG — SIGNIFICANT CHANGE UP (ref 35–40)
POTASSIUM BLDV-SCNC: 3.5 MMOL/L — SIGNIFICANT CHANGE UP (ref 3.4–4.5)
POTASSIUM BLDV-SCNC: 4 MMOL/L — SIGNIFICANT CHANGE UP (ref 3.4–4.5)
POTASSIUM SERPL-MCNC: 3.2 MMOL/L — LOW (ref 3.5–5.3)
POTASSIUM SERPL-MCNC: 4.1 MMOL/L — SIGNIFICANT CHANGE UP (ref 3.5–5.3)
POTASSIUM SERPL-SCNC: 3.2 MMOL/L — LOW (ref 3.5–5.3)
POTASSIUM SERPL-SCNC: 4.1 MMOL/L — SIGNIFICANT CHANGE UP (ref 3.5–5.3)
PROMYELOCYTES # FLD: 0 % — SIGNIFICANT CHANGE UP (ref 0–0)
PROT SERPL-MCNC: 5.5 G/DL — LOW (ref 6–8.3)
PROT SERPL-MCNC: 6.7 G/DL — SIGNIFICANT CHANGE UP (ref 6–8.3)
PROTHROM AB SERPL-ACNC: 23.8 SEC — HIGH (ref 9.8–13.1)
RBC # BLD: 2.69 M/UL — LOW (ref 3.8–5.2)
RBC # FLD: 18.6 % — HIGH (ref 10.3–14.5)
RETICS #: 5 K/UL — LOW (ref 25–125)
RETICS/RBC NFR: 0.2 % — LOW (ref 0.5–2.5)
REVIEW TO FOLLOW: YES — SIGNIFICANT CHANGE UP
RH IG SCN BLD-IMP: POSITIVE — SIGNIFICANT CHANGE UP
RSV RNA SPEC QL NAA+PROBE: NOT DETECTED — SIGNIFICANT CHANGE UP
RV+EV RNA SPEC QL NAA+PROBE: NOT DETECTED — SIGNIFICANT CHANGE UP
SAO2 % BLDV: 57.7 % — LOW (ref 60–85)
SAO2 % BLDV: 66.3 % — SIGNIFICANT CHANGE UP (ref 60–85)
SODIUM SERPL-SCNC: 135 MMOL/L — SIGNIFICANT CHANGE UP (ref 135–145)
SODIUM SERPL-SCNC: 138 MMOL/L — SIGNIFICANT CHANGE UP (ref 135–145)
URATE SERPL-MCNC: 9.4 MG/DL — HIGH (ref 2.5–7)
VARIANT LYMPHS # BLD: 22.6 % — SIGNIFICANT CHANGE UP
VIT B12 SERPL-MCNC: 517 PG/ML — SIGNIFICANT CHANGE UP (ref 200–900)
WBC # BLD: 0.35 K/UL — CRITICAL LOW (ref 3.8–10.5)
WBC # FLD AUTO: 0.35 K/UL — CRITICAL LOW (ref 3.8–10.5)

## 2019-02-05 PROCEDURE — 71046 X-RAY EXAM CHEST 2 VIEWS: CPT | Mod: 26

## 2019-02-05 PROCEDURE — 99223 1ST HOSP IP/OBS HIGH 75: CPT | Mod: GC

## 2019-02-05 PROCEDURE — 72125 CT NECK SPINE W/O DYE: CPT | Mod: 26

## 2019-02-05 PROCEDURE — 70450 CT HEAD/BRAIN W/O DYE: CPT | Mod: 26

## 2019-02-05 RX ORDER — ACETAMINOPHEN 500 MG
2 TABLET ORAL
Qty: 0 | Refills: 0 | COMMUNITY

## 2019-02-05 RX ORDER — CEFEPIME 1 G/1
2000 INJECTION, POWDER, FOR SOLUTION INTRAMUSCULAR; INTRAVENOUS ONCE
Qty: 0 | Refills: 0 | Status: COMPLETED | OUTPATIENT
Start: 2019-02-05 | End: 2019-02-05

## 2019-02-05 RX ORDER — INSULIN LISPRO 100/ML
VIAL (ML) SUBCUTANEOUS
Qty: 0 | Refills: 0 | Status: DISCONTINUED | OUTPATIENT
Start: 2019-02-05 | End: 2019-02-10

## 2019-02-05 RX ORDER — VANCOMYCIN HCL 1 G
1000 VIAL (EA) INTRAVENOUS ONCE
Qty: 0 | Refills: 0 | Status: DISCONTINUED | OUTPATIENT
Start: 2019-02-05 | End: 2019-02-05

## 2019-02-05 RX ORDER — GLUCAGON INJECTION, SOLUTION 0.5 MG/.1ML
1 INJECTION, SOLUTION SUBCUTANEOUS ONCE
Qty: 0 | Refills: 0 | Status: DISCONTINUED | OUTPATIENT
Start: 2019-02-05 | End: 2019-02-20

## 2019-02-05 RX ORDER — HEPARIN SODIUM 5000 [USP'U]/ML
5000 INJECTION INTRAVENOUS; SUBCUTANEOUS EVERY 8 HOURS
Qty: 0 | Refills: 0 | Status: DISCONTINUED | OUTPATIENT
Start: 2019-02-05 | End: 2019-02-17

## 2019-02-05 RX ORDER — DEXTROSE 50 % IN WATER 50 %
25 SYRINGE (ML) INTRAVENOUS ONCE
Qty: 0 | Refills: 0 | Status: DISCONTINUED | OUTPATIENT
Start: 2019-02-05 | End: 2019-02-20

## 2019-02-05 RX ORDER — AZITHROMYCIN 500 MG/1
500 TABLET, FILM COATED ORAL EVERY 24 HOURS
Qty: 0 | Refills: 0 | Status: DISCONTINUED | OUTPATIENT
Start: 2019-02-06 | End: 2019-02-09

## 2019-02-05 RX ORDER — AZITHROMYCIN 500 MG/1
500 TABLET, FILM COATED ORAL ONCE
Qty: 0 | Refills: 0 | Status: COMPLETED | OUTPATIENT
Start: 2019-02-05 | End: 2019-02-05

## 2019-02-05 RX ORDER — CEFEPIME 1 G/1
2000 INJECTION, POWDER, FOR SOLUTION INTRAMUSCULAR; INTRAVENOUS DAILY
Qty: 0 | Refills: 0 | Status: DISCONTINUED | OUTPATIENT
Start: 2019-02-06 | End: 2019-02-06

## 2019-02-05 RX ORDER — AMLODIPINE BESYLATE AND BENAZEPRIL HYDROCHLORIDE 10; 20 MG/1; MG/1
1 CAPSULE ORAL
Qty: 0 | Refills: 0 | COMMUNITY

## 2019-02-05 RX ORDER — DEXTROSE 50 % IN WATER 50 %
15 SYRINGE (ML) INTRAVENOUS ONCE
Qty: 0 | Refills: 0 | Status: DISCONTINUED | OUTPATIENT
Start: 2019-02-05 | End: 2019-02-20

## 2019-02-05 RX ORDER — PIPERACILLIN AND TAZOBACTAM 4; .5 G/20ML; G/20ML
3.38 INJECTION, POWDER, LYOPHILIZED, FOR SOLUTION INTRAVENOUS ONCE
Qty: 0 | Refills: 0 | Status: DISCONTINUED | OUTPATIENT
Start: 2019-02-05 | End: 2019-02-05

## 2019-02-05 RX ORDER — ACETAMINOPHEN 500 MG
650 TABLET ORAL EVERY 6 HOURS
Qty: 0 | Refills: 0 | Status: DISCONTINUED | OUTPATIENT
Start: 2019-02-05 | End: 2019-02-20

## 2019-02-05 RX ORDER — SODIUM CHLORIDE 9 MG/ML
1000 INJECTION, SOLUTION INTRAVENOUS
Qty: 0 | Refills: 0 | Status: DISCONTINUED | OUTPATIENT
Start: 2019-02-05 | End: 2019-02-20

## 2019-02-05 RX ORDER — SODIUM CHLORIDE 9 MG/ML
1000 INJECTION, SOLUTION INTRAVENOUS
Qty: 0 | Refills: 0 | Status: COMPLETED | OUTPATIENT
Start: 2019-02-05 | End: 2019-02-06

## 2019-02-05 RX ORDER — ACETAMINOPHEN 500 MG
975 TABLET ORAL ONCE
Qty: 0 | Refills: 0 | Status: COMPLETED | OUTPATIENT
Start: 2019-02-05 | End: 2019-02-05

## 2019-02-05 RX ORDER — INSULIN LISPRO 100/ML
VIAL (ML) SUBCUTANEOUS AT BEDTIME
Qty: 0 | Refills: 0 | Status: DISCONTINUED | OUTPATIENT
Start: 2019-02-05 | End: 2019-02-20

## 2019-02-05 RX ORDER — VANCOMYCIN HCL 1 G
1000 VIAL (EA) INTRAVENOUS ONCE
Qty: 0 | Refills: 0 | Status: COMPLETED | OUTPATIENT
Start: 2019-02-05 | End: 2019-02-05

## 2019-02-05 RX ORDER — SODIUM CHLORIDE 9 MG/ML
2000 INJECTION INTRAMUSCULAR; INTRAVENOUS; SUBCUTANEOUS ONCE
Qty: 0 | Refills: 0 | Status: COMPLETED | OUTPATIENT
Start: 2019-02-05 | End: 2019-02-05

## 2019-02-05 RX ORDER — AZITHROMYCIN 500 MG/1
TABLET, FILM COATED ORAL
Qty: 0 | Refills: 0 | Status: DISCONTINUED | OUTPATIENT
Start: 2019-02-05 | End: 2019-02-09

## 2019-02-05 RX ORDER — DEXTROSE 50 % IN WATER 50 %
12.5 SYRINGE (ML) INTRAVENOUS ONCE
Qty: 0 | Refills: 0 | Status: DISCONTINUED | OUTPATIENT
Start: 2019-02-05 | End: 2019-02-20

## 2019-02-05 RX ORDER — IBUPROFEN 200 MG
600 TABLET ORAL ONCE
Qty: 0 | Refills: 0 | Status: COMPLETED | OUTPATIENT
Start: 2019-02-05 | End: 2019-02-05

## 2019-02-05 RX ADMIN — SODIUM CHLORIDE 100 MILLILITER(S): 9 INJECTION, SOLUTION INTRAVENOUS at 20:58

## 2019-02-05 RX ADMIN — Medication 600 MILLIGRAM(S): at 13:00

## 2019-02-05 RX ADMIN — Medication 2: at 19:33

## 2019-02-05 RX ADMIN — AZITHROMYCIN 250 MILLIGRAM(S): 500 TABLET, FILM COATED ORAL at 19:14

## 2019-02-05 RX ADMIN — Medication 250 MILLIGRAM(S): at 13:46

## 2019-02-05 RX ADMIN — HEPARIN SODIUM 5000 UNIT(S): 5000 INJECTION INTRAVENOUS; SUBCUTANEOUS at 22:33

## 2019-02-05 RX ADMIN — SODIUM CHLORIDE 2000 MILLILITER(S): 9 INJECTION INTRAMUSCULAR; INTRAVENOUS; SUBCUTANEOUS at 12:25

## 2019-02-05 RX ADMIN — Medication 975 MILLIGRAM(S): at 13:00

## 2019-02-05 RX ADMIN — CEFEPIME 100 MILLIGRAM(S): 1 INJECTION, POWDER, FOR SOLUTION INTRAMUSCULAR; INTRAVENOUS at 13:00

## 2019-02-05 NOTE — H&P ADULT - PROBLEM SELECTOR PLAN 7
-Hx of malignant neoplasm of right female breast and intraductal carcinoma in situ of left breast) on chemo (last treatment 8 days ago)  -Will touch base with Dr. Ruiz regarding admission -On home glimepride 1 mg, will hold   -serum glucose 251  -start low dose insulin sliding scale with FS premeal and qhs  -check HbA1C

## 2019-02-05 NOTE — ED ADULT NURSE NOTE - ED STAT RN HANDOFF DETAILS
Report given to PETER Farrar for bed 409.  Pt to be transported to unit with respiratory, ED RN and EDT.

## 2019-02-05 NOTE — H&P ADULT - PROBLEM SELECTOR PLAN 3
-Pt syncopized at 10 today, likely 2/2 orthostatic hypotension in the setting of sepsis, although will r/o other causes  -CT head neg  -EKG showed sinus tach  -BPs stable with SBP in 140s -Pt syncopized at 10 today, likely 2/2 orthostatic hypotension in the setting of sepsis, although will r/o other causes  -CT head neg  -EKG showed sinus tach  -BPs stable with SBP in 140s  -F/u with echo, orthostatics  -tele monitor -check stool studies and Cdiff PCR   -monitor bowel movements

## 2019-02-05 NOTE — H&P ADULT - PROBLEM SELECTOR PLAN 8
-CBC showed hemoglobin of 7.3, down from baseline of 9-10  -likely 2/2 BM suppression from chemo  -No history of bleeding  -will monitor H&H daily. -CBC showed hemoglobin of 7.3, down from baseline of 9-10  -decreased reticulocyte, normocytic  -likely 2/2 BM suppression from chemo  -No history of bleeding  -will monitor H&H daily.  -Order Fe studies, B12, Foldate, LDH, haptoglobin levels -Hx of HTN, on home amlodipine-benazepril 10 mg-40 mg   -BP stable (SBP 140s)  -hold home BP meds in setting of sepsis/volume depletion/syncope  -monitor BP

## 2019-02-05 NOTE — ED PROVIDER NOTE - OBJECTIVE STATEMENT
Kiki Rock MD: 73yo female with PMH of breast ca on chemo (last treatment 8 days ago), CKD, HTN, DMT2 who presents s/p syncope at 10AM. As per daughter at bedside, pt was found on the floor covered in feces, disoriented. Pt lives with daughter. States that she was trying to get out of bed before slipping and falling. Watery diarrhea for last day, chills, recent cough and decreased appetite. EMS found pt to be hypoxic to 82% on RA and AAOx2. Unknown whether there was any seizure like activity. No anticoagulants. No SOB, CP, N/V, abdominal pain, dysuria, hematuria, vaginal bleeding/discharge, hematochezia, melena, focal neurological deficits, recent sick contacts, recent travel.     Oncologist: Dr. Pratt at [place] Kiki Rock MD: 73yo female with PMH of breast ca on chemo (last treatment 8 days ago), CKD, HTN, DMT2 who presents s/p syncope at 10AM. As per daughter at bedside, pt was found on the floor covered in feces, disoriented. Pt lives with daughter. States that she was trying to get out of bed before slipping and falling. Watery diarrhea for last day, chills, recent cough and decreased appetite. EMS found pt to be hypoxic to 82% on RA and AAOx2. Unknown whether there was any seizure like activity. No anticoagulants. No SOB, CP, N/V, abdominal pain, dysuria, hematuria, vaginal bleeding/discharge, hematochezia, melena, focal neurological deficits, recent sick contacts, recent travel, rash.     Oncologist: Dr. Pratt at [place] Kiki Rock MD: 73yo female with PMH of breast ca on chemo (last treatment 8 days ago), CKD, HTN, DMT2 who presents s/p syncope at 10AM. As per daughter at bedside, pt was found on the floor covered in feces, disoriented. Pt lives with daughter. States that she was trying to get out of bed before slipping and falling. Watery diarrhea for last day, chills, recent cough and decreased appetite. EMS found pt to be hypoxic to 82% on RA and AAOx2. Unknown whether there was any seizure like activity. No anticoagulants. No SOB, CP, N/V, abdominal pain, dysuria, hematuria, vaginal bleeding/discharge, hematochezia, melena, focal neurological deficits, recent sick contacts, recent travel, rash.     Oncologist: Dr. Pratt at [place]  PMD: Eliceo Mccartney, [phone] none

## 2019-02-05 NOTE — ED ADULT TRIAGE NOTE - CHIEF COMPLAINT QUOTE
pt with breast CA on chemo, was found collapsed on bathroom floor covered in feces. pt aox2, palced on 100% NRB by EMS for ra O2 sat of 87%

## 2019-02-05 NOTE — ED ADULT NURSE NOTE - OBJECTIVE STATEMENT
pt from home for fever, weakness, as per family pt was found on floor this AM pt not able to recall incident denies pain no obvious bruising noted, med port to right chest area accessed with Larson needle 20g labs done, IV fluids started, lungs clear, tachypneic 20-22b/min oxygen Sat 99% RA, Rectal temp. 103.1 F. pending dispo.      Saritha Simms RN

## 2019-02-05 NOTE — ED PROVIDER NOTE - PHYSICAL EXAMINATION
Kiki Rock MD:   CONSTITUTIONAL: sick, elderly female, resting comfortably in no acute distress  HEAD: Normocephalic; atraumatic  EYES: Normal inspection, EOMI, PERRLA  ENMT: External appears normal; normal oropharynx  NECK: Supple; non-tender; no cervical lymphadenopathy  CARD: RRR; no audible murmurs, rubs, or gallops  RESP: No respiratory distress, lungs ctab/l  ABD: Soft, non-distended; non-tender; no rebound or guarding  EXT: No LE pitting edema or calf tenderness; distal pulses intact with good capillary refill  SKIN: Warm, dry, intact  NEURO: aaox3, CN II-IX intact, 5/5 strength b/l UE and LE, sensation intact in all extremities, no pronator drift, ambulates with a steady gait, finger to nose intact, no disdiadokinesia, no midline TTP

## 2019-02-05 NOTE — H&P ADULT - NSHPLABSRESULTS_GEN_ALL_CORE
7.3    0.35  )-----------( 109    ( 05 Feb 2019 12:00 )             22.7     02-05    135  |  96<L>  |  27<H>  ----------------------------<  251<H>  4.1   |  22  |  2.35<H>    Ca    9.2      05 Feb 2019 12:00    TPro  6.7  /  Alb  3.9  /  TBili  0.9  /  DBili  x   /  AST  28  /  ALT  20  /  AlkPhos  58  02-05      CXR PA/Lat: IMPRESSION:  Currently present accessed right chest wall CT compatible power infusion  port with tip in SVC region. Ill-defined bandlike opacity across anterior right lower lung over minor  fissure region indeterminate for loculated pleural fluid versus   infiltrate/pneumonia follow-up recommended. Clear remaining visualized lungs. No pleural effusions or pneumothorax.    · EKG Date/Time: 05-Feb-2019 11:34  · Rate: 132  · Interpretation: abnormal  · Abnormal Rhythm: sinus tachycardia  · Acute or Evolving MI?: no  · Axis: Normal  · MD: 136  · QRS: 84  · ST/T Wave: No ST/T wave abnormalities 7.3    0.35  )-----------( 109    ( 05 Feb 2019 12:00 )             22.7     02-05    135  |  96<L>  |  27<H>  ----------------------------<  251<H>  4.1   |  22  |  2.35<H>    Ca    9.2      05 Feb 2019 12:00    TPro  6.7  /  Alb  3.9  /  TBili  0.9  /  DBili  x   /  AST  28  /  ALT  20  /  AlkPhos  58  02-05      CXR PA/Lat reviewed:   Currently present accessed right chest wall CT compatible power infusion  port with tip in SVC region. Ill-defined bandlike opacity across anterior right lower lung over minor  fissure region indeterminate for loculated pleural fluid versus   infiltrate/pneumonia follow-up recommended. Clear remaining visualized lungs. No pleural effusions or pneumothorax.    EKG tracing reviewed and interpreted: Sinus tach

## 2019-02-05 NOTE — H&P ADULT - ATTENDING COMMENTS
Patient was physically seen and examined by me. I agree with above plan and findings.  Sepsis likely d/t HCAP, continue IV Vanco/Cefepime/Zithromax, f/up cultures and stool studies   Chemo induced pancytopenia, monitor CBC  ISABELLA likely pre-renal, IVF, monitor Cr  Syncope likely d/t hypovolemia, check orthostatics and TTE, tele monitoring Patient was physically seen and examined by me 2/5/19 at 6:00PM. I agree with above plan and findings.  Sepsis likely d/t HCAP, continue IV Vanco/Cefepime/Zithromax, f/up cultures and stool studies   Chemo induced pancytopenia, monitor CBC  ISABELLA likely pre-renal, IVF, monitor Cr  Syncope likely d/t hypovolemia, check orthostatics and TTE, tele monitoring

## 2019-02-05 NOTE — H&P ADULT - NSHPREVIEWOFSYSTEMS_GEN_ALL_CORE
General: +fever, +chills  HEENT: denies nasal congestion, sore throat, rhinorrhea  Eyes: denies vision changes, blurry vision  CV: denies chest pain, palpitations  Resp: denies difficulty breathing, +cough  Abdominal: denies nausea, vomiting, +diarrhea, denies abdominal pain, blood in stool, dark stool  : denies pain with urination/changes in urination  MSK: denies recent trauma or muscle weakness  Neuro: denies headaches, numbness, tingling, dizziness, lightheadedness.  Skin: denies new rashes, lesions, bruises  Endocrine: denies recent weight loss General: +fever, +chills  HEENT: denies nasal congestion, sore throat, rhinorrhea  Eyes: denies vision changes, blurry vision  CV: denies chest pain, palpitations  Resp: denies difficulty breathing, +cough  Abdominal: denies nausea, vomiting, +diarrhea, denies abdominal pain, blood in stool, dark stool  : denies pain with urination/changes in urination  MSK: denies recent trauma or muscle weakness  Neuro: denies headaches, numbness, tingling, dizziness, lightheadedness.  Skin: denies new rashes, lesions, bruises  Endocrine: denies recent weight loss  Psych: no depression, no anxiety   Heme: No bleeding from gums

## 2019-02-05 NOTE — ED PROVIDER NOTE - ATTENDING CONTRIBUTION TO CARE
Dr. Kamara: 75 yo female with breast cancer on chemo (last chemo 8 days ago), CKD, HTN, DM, in ED after being found on floor by family.  Pt does endorse recent diarrhea and was found to be hypoxic to 82% on RA.  On exam pt chronically-ill appearing but in NAD, heart RRR, lungs CTAB, abd NTND, extremities without swelling, strength 5/5 in all extremities and skin without rash.

## 2019-02-05 NOTE — H&P ADULT - PROBLEM SELECTOR PLAN 5
-On home glimepride 1 mg  -serum glucose 251  -hold home med and start low dose insulin sliding scale with FS premeal and qhs -On home glimepride 1 mg  -serum glucose 251  -hold home med and start low dose insulin sliding scale with FS premeal and qhs  -HbA1C -ISABELLA on CKD likely prerenal   -Cr of 2.35, with a baseline of ~1.3  -F/u with UA, urine electrolytes bladder scan  -IV hydration   -avoid nephrotoxic agents   -monitor Cr

## 2019-02-05 NOTE — H&P ADULT - PROBLEM SELECTOR PLAN 2
-CBC showed severe leukopenia (WBC 0.35) and neutropenia (0.01 K/ul) with high fever at 103, likely 2/2 chemo 8 days prior.  -Continue with sepsis management/workup at noted above -RLL opacity on CXR   -continue IV Vanco and Cefepime for possible MRSA or gram negative PNA  -add Zithromax for atypical coverage   -f/up blood cultures, sputum culture if cough productive   -check urine legionella

## 2019-02-05 NOTE — H&P ADULT - PROBLEM SELECTOR PLAN 1
-Met sepsis criteria in the ED (T 39.4-39.7 C, -124, RR 20, lactate 2.9), with severe leukopenia (WBC 0.35) and neutropenia (0.01 K/ul)  -Pt received 2L NS, cefepime vanco, APAP and ibuprofen.   -Source possibly PNA in the setting of cough and opacity on CXR vs GI in the setting of diarrhea  -Continue IVF, broad spectrum ABx, daily CBC w/ diff, CMP, VBG  -F/u blood/urine cultures  -monitor VS q4h -Met sepsis criteria in the ED (T 39.4-39.7 C, -124, RR 20, lactate 2.9), with severe leukopenia (WBC 0.35) and neutropenia (0.01 K/ul), likely secondary to HCAP  -Pt received 2L NS, cefepime vanco, APAP and ibuprofen.   -Source possibly PNA in the setting of cough and opacity on CXR vs GI in the setting of diarrhea  -Continue IV Vanco, Cefepime, and Zithromax. Monitor Vanco level.   -F/u blood/urine cultures  -check stool studies   -monitor VS q4h

## 2019-02-05 NOTE — H&P ADULT - HISTORY OF PRESENT ILLNESS
Pt is a 73yo F with PMH of breast CA (dx with malignant neoplasm of right female breast and intraductal carcinoma in situ of left breast) on chemo (last treatment 8 days ago), CKD, HTN, DMT2 (not on home insulin) presenting s/p syncopal episode. Pt was found down and disorientated by her daughter covered in feces, requiring assistance to stand up. Daughter denied abnormal movements, unknown if urinary incontinence, denied tongue bitting. The pt does not remember the events surrounding the fall. When EMS arrived, she was hypoxic with SpO2 of 82% and A+Ox2. She has had 4 episodes of watery diarrhea, decreased appetite, and chills starting yesterday. She has also had a lingering dry cough for the past couple of days. Denied fevers, dizziness, blurry vision, congestion, SOB, chest pain, abdominal pain, n/v/c, muscle weakness, pain.     In the ED: Pt was febrile (39.4-39.7 C), tachycardic (111-124), RR of 20, and normotensive (SBP 140s). Labs showed severe leukopenia (WBC 0.36), with neutropenia (0.01 K/ul), anemia, ISABELLA, high lactate (2.9). She was given 2L NS, cefepime vanco, APAP and ibuprofen. Pt is a 73yo F with PMH of breast CA (dx with malignant neoplasm of right female breast and intraductal carcinoma in situ of left breast) on chemo (last treatment 8 days ago), CKD, HTN, DMT2 (not on home insulin) presenting s/p syncopal episode. Pt was found down and disorientated by her daughter covered in feces, requiring assistance to stand up. Daughter denied abnormal movements, unknown if urinary incontinence, denied tongue bitting. The pt does not remember the events surrounding the fall, although believes she was probably getting out of bed based on location. When EMS arrived, she was hypoxic with SpO2 of 82% and A+Ox2. She has had 4 episodes of watery diarrhea, decreased appetite, and chills starting yesterday. She has also had a lingering dry cough for the past couple of days. Denied fevers, dizziness, blurry vision, congestion, SOB, chest pain, abdominal pain, n/v/c, muscle weakness, pain.     In the ED: Pt was febrile (39.4-39.7 C), tachycardic (111-124), RR of 20, and normotensive (SBP 140s). Labs showed severe leukopenia (WBC 0.36), with neutropenia (0.01 K/ul), anemia, ISABELLA, high lactate (2.9). She was given 2L NS, cefepime vanco, APAP and ibuprofen. Pt is a 73yo F with PMH of breast CA (dx with malignant neoplasm of right female breast and intraductal carcinoma in situ of left breast) on chemo (last treatment 8 days ago), CKD, HTN, DMT2 (not on home insulin) presenting s/p syncopal episode. Pt was found down and disorientated this AM by her daughter covered in feces, requiring assistance to stand up. Daughter denied abnormal movements, unknown if urinary incontinence, denied tongue bitting. The pt does not remember the events surrounding the fall, although believes she was probably getting out of bed based on location. When EMS arrived, she was hypoxic with SpO2 of 82% and A+Ox2. She has had 4 episodes of watery diarrhea, decreased appetite, and chills starting yesterday. She has also had a lingering dry cough for the past couple of days. Denied fevers, dizziness, blurry vision, congestion, SOB, chest pain, abdominal pain, n/v/c, muscle weakness, pain.     In the ED: Pt was febrile (39.4-39.7 C), tachycardic (111-124), RR of 20, and normotensive (SBP 140s). Labs showed severe leukopenia (WBC 0.36), with neutropenia (0.01 K/ul), anemia, ISABELLA, high lactate (2.9). She was given 2L NS, cefepime vanco, APAP and ibuprofen.

## 2019-02-05 NOTE — ED PROVIDER NOTE - NS ED ROS FT
Kiki Rock MD:   General: +fever, +chills  HENT: denies nasal congestion, sore throat, rhinorrhea  Eyes: denies vision changes  CV: denies chest pain  Resp: denies difficulty breathing, +cough  Abdominal: denies nausea, vomiting, +diarrhea, abdominal pain, blood in stool, dark stool  : denies pain with urination  MSK: denies recent trauma  Neuro: denies headaches, numbness, tingling, dizziness, lightheadedness.  Skin: denies new rashes  Endocrine: denies recent weight loss

## 2019-02-05 NOTE — H&P ADULT - NSHPPHYSICALEXAM_GEN_ALL_CORE
CONSTITUTIONAL: sick, elderly female, resting comfortably in no acute distress  HEAD: Normocephalic; atraumatic  EYES: EOMI, PERRLA  ENMT: External appears normal; normal oropharynx, dry mucus membranes  NECK: Supple; non-tender; no cervical lymphadenopathy, FROM  CARD: tachycardic, normal S1S2, no audible murmurs, rubs, or gallops  RESP: Increased respiratory effort without substernal/intercostal retractions, clear to ausc b/l  ABD: Soft, non-distended; non-tender; no rebound or guarding  EXT: No LE pitting edema or calf tenderness; distal pulses intact with good capillary refill  SKIN: Warm, dry, intact, no rashes or lesions  NEURO: aaox3, CN II-IX intact, 5/5 strength b/l UE and LE, sensation intact in all extremities, ambulates with a steady gait, finger to nose intact, no disdiadokinesia, babinski negative Vital Signs Last 24 Hrs  T(C): 37.3 (05 Feb 2019 16:20), Max: 39.7 (05 Feb 2019 13:28)  T(F): 99.1 (05 Feb 2019 16:20), Max: 103.4 (05 Feb 2019 13:28)  HR: 104 (05 Feb 2019 16:20) (104 - 124)  BP: 102/52 (05 Feb 2019 16:20) (102/52 - 148/85)  BP(mean): --  RR: 18 (05 Feb 2019 16:20) (18 - 20)  SpO2: 99% (05 Feb 2019 16:20) (97% - 100%)    CONSTITUTIONAL: elderly female, resting comfortably in no acute distress  HEAD: Normocephalic; atraumatic  EYES: EOMI, PERRLA  ENMT: External appears normal; normal oropharynx, dry mucus membranes  NECK: Supple; non-tender; no cervical lymphadenopathy, FROM  CARD: tachycardic, normal S1S2, no audible murmurs, rubs, or gallops  RESP: Increased respiratory effort without substernal/intercostal retractions, clear to ausc b/l  ABD: Soft, non-distended; non-tender; no rebound or guarding  EXT: No LE pitting edema or calf tenderness; distal pulses intact with good capillary refill. No cyanosis   VASC: 2+ distal pulses in extremities   SKIN: Warm, dry, intact, no rashes or lesions  NEURO: AAOx3, CN II-IX intact, 5/5 strength b/l UE and LE, sensation intact in all extremities, ambulates with a steady gait, finger to nose intact, babinski negative

## 2019-02-05 NOTE — H&P ADULT - PROBLEM SELECTOR PLAN 4
-Pt presented with ISABELLA in the setting of CKD  -Cr of 2.35, with a baseline of ~1.3  -Likely prerenal 2/2 fluid loss -Pt presented with ISABELLA in the setting of CKD  -Cr of 2.35, with a baseline of ~1.3  -Unknown etiology  -F/u with UA, urine electrolytes bladder scan -syncope likely 2/2 orthostatic hypotension/hypovolemia in the setting of sepsis and diarrhea  -CT head neg  -EKG showed sinus tach  -BPs stable with SBP in 140s  -check, orthostatics  -check TTE  -tele monitor

## 2019-02-05 NOTE — H&P ADULT - PROBLEM SELECTOR PLAN 6
-Hx of HTN, on home amlodipine-benazepril 10 mg-40 mg   -BP stable (SBP 140s)  -hold home BP meds in setting of sepsis/volume depletion/syncope -chemo induced pancytopenia   -Monitor CBC  -transfuse prn, keep Hgb above 7

## 2019-02-05 NOTE — ED PROVIDER NOTE - MEDICAL DECISION MAKING DETAILS
Kiki Rock MD: 75yo female with PMH of breast ca on chemo (last treatment 8 days ago), CKD, HTN, DMT2 who presents s/p syncope at 10AM. Pt is febrile, tachycardic in ED. No FNDs or midline TTP, but will evaluate for head bleed. Cough, but lungs are CTAB. Sepsis 2/2 pneumonia vs UTI vs other. Plan: labs, cultures, UA, urine culture, VBG, abx, IVF, CT head/neck, EKG, CXR.

## 2019-02-05 NOTE — H&P ADULT - PROBLEM SELECTOR PLAN 9
-DVT ppx- heparin 5000 U q8  -Diet- DASH -DVT ppx- heparin 5000 U q8  -Diet- DASH/low carb -Hx of malignant neoplasm of right female breast and intraductal carcinoma in situ of left breast) on chemo (last treatment 8 days ago)  -Will touch base with Dr. Ruiz from Onc

## 2019-02-05 NOTE — H&P ADULT - ASSESSMENT
73yo F with PMH of breast CA on chemo (last treatment 8 days ago), CKD, HTN, DMT2 (not on home insulin) presenting s/p syncopal episode, found to be septic (T 39.4-39.7 C, -124, RR 20, lactate 2.9), with severe leukopenia (WBC 0.35) and neutropenia (0.01 K/ul), c/b anemia and ISABELLA, found to have ill-defined bandlike opacity at RLL on CXR, likely 2/2 orthostatic hypotension in the setting of neutropenic fever and diarrhea 2/2 PNA vs other source. 73yo F with PMH of breast CA on chemo (last treatment 8 days ago), CKD, HTN, DMT2 presenting s/p syncopal episode, found to be in severeneutropenic sepsis (T 39.4-39.7 C, -124, RR 20, lactate 2.9, WBC 0.35, 0.01 K/ul) in setting of recent chemotherapy, likely 2/2 PNA, c/b ISABELLA on CKD. 75yo F with PMH of breast CA on chemo (last treatment 8 days ago), CKD, HTN, DMT2 presenting s/p syncopal episode, found to be in neutropenic sepsis (T 39.4-39.7 C, -124, RR 20, lactate 2.9, WBC 0.35, 0.01 K/ul) in setting of recent chemotherapy, likely 2/2 PNA, c/b ISABELLA on CKD.

## 2019-02-05 NOTE — H&P ADULT - NSHPSOCIALHISTORY_GEN_ALL_CORE
Pt lives at a private residence with her daughters. Denied hx of smoking/EtOH use. Functionally independent, uses cane when ambulating outside the home.

## 2019-02-05 NOTE — ED ADULT NURSE NOTE - NSFALLRSKOUTCOME_ED_ALL_ED
[address] Patient Status:  Hospital Outpatient Surgery   Age/Gender 63year old male MRN [id number]   [facility] SURGERY Attending Joanne Polanco MD   Hosp Day # 0 PCP Page Moreira MD       Anesthesia Post-op No
Fall with Harm Risk

## 2019-02-06 DIAGNOSIS — D68.9 COAGULATION DEFECT, UNSPECIFIED: ICD-10-CM

## 2019-02-06 LAB
ALBUMIN SERPL ELPH-MCNC: 3.3 G/DL — SIGNIFICANT CHANGE UP (ref 3.3–5)
ALBUMIN SERPL ELPH-MCNC: 3.5 G/DL — SIGNIFICANT CHANGE UP (ref 3.3–5)
ALP SERPL-CCNC: 52 U/L — SIGNIFICANT CHANGE UP (ref 40–120)
ALP SERPL-CCNC: 57 U/L — SIGNIFICANT CHANGE UP (ref 40–120)
ALT FLD-CCNC: 20 U/L — SIGNIFICANT CHANGE UP (ref 4–33)
ALT FLD-CCNC: 22 U/L — SIGNIFICANT CHANGE UP (ref 4–33)
ANION GAP SERPL CALC-SCNC: 14 MMO/L — SIGNIFICANT CHANGE UP (ref 7–14)
ANION GAP SERPL CALC-SCNC: 16 MMO/L — HIGH (ref 7–14)
ANISOCYTOSIS BLD QL: SLIGHT — SIGNIFICANT CHANGE UP
APPEARANCE UR: SIGNIFICANT CHANGE UP
APTT BLD: 35.7 SEC — SIGNIFICANT CHANGE UP (ref 27.5–36.3)
APTT BLD: 39.8 SEC — HIGH (ref 27.5–36.3)
AST SERPL-CCNC: 27 U/L — SIGNIFICANT CHANGE UP (ref 4–32)
AST SERPL-CCNC: 33 U/L — HIGH (ref 4–32)
BACTERIA # UR AUTO: SIGNIFICANT CHANGE UP
BASE EXCESS BLDA CALC-SCNC: -3.9 MMOL/L — SIGNIFICANT CHANGE UP
BASOPHILS # BLD AUTO: 0 K/UL — SIGNIFICANT CHANGE UP (ref 0–0.2)
BASOPHILS NFR BLD AUTO: 0 % — SIGNIFICANT CHANGE UP (ref 0–2)
BASOPHILS NFR SPEC: 6.7 % — HIGH (ref 0–2)
BILIRUB SERPL-MCNC: 0.7 MG/DL — SIGNIFICANT CHANGE UP (ref 0.2–1.2)
BILIRUB SERPL-MCNC: 0.8 MG/DL — SIGNIFICANT CHANGE UP (ref 0.2–1.2)
BILIRUB UR-MCNC: NEGATIVE — SIGNIFICANT CHANGE UP
BLASTS # FLD: 0 % — SIGNIFICANT CHANGE UP (ref 0–0)
BLOOD UR QL VISUAL: SIGNIFICANT CHANGE UP
BUN SERPL-MCNC: 32 MG/DL — HIGH (ref 7–23)
BUN SERPL-MCNC: 33 MG/DL — HIGH (ref 7–23)
CALCIUM SERPL-MCNC: 9 MG/DL — SIGNIFICANT CHANGE UP (ref 8.4–10.5)
CALCIUM SERPL-MCNC: 9.1 MG/DL — SIGNIFICANT CHANGE UP (ref 8.4–10.5)
CHLORIDE BLDA-SCNC: 107 MMOL/L — SIGNIFICANT CHANGE UP (ref 96–108)
CHLORIDE SERPL-SCNC: 102 MMOL/L — SIGNIFICANT CHANGE UP (ref 98–107)
CHLORIDE SERPL-SCNC: 103 MMOL/L — SIGNIFICANT CHANGE UP (ref 98–107)
CK MB BLD-MCNC: 1.2 NG/ML — SIGNIFICANT CHANGE UP (ref 1–4.7)
CK SERPL-CCNC: 452 U/L — HIGH (ref 25–170)
CO2 SERPL-SCNC: 20 MMOL/L — LOW (ref 22–31)
CO2 SERPL-SCNC: 21 MMOL/L — LOW (ref 22–31)
COLOR SPEC: YELLOW — SIGNIFICANT CHANGE UP
CREAT ?TM UR-MCNC: 113.2 MG/DL — SIGNIFICANT CHANGE UP
CREAT SERPL-MCNC: 1.73 MG/DL — HIGH (ref 0.5–1.3)
CREAT SERPL-MCNC: 1.96 MG/DL — HIGH (ref 0.5–1.3)
D DIMER BLD IA.RAPID-MCNC: 992 NG/ML — SIGNIFICANT CHANGE UP
EOSINOPHIL # BLD AUTO: 0 K/UL — SIGNIFICANT CHANGE UP (ref 0–0.5)
EOSINOPHIL NFR BLD AUTO: 0 % — SIGNIFICANT CHANGE UP (ref 0–6)
EOSINOPHIL NFR FLD: 6.7 % — HIGH (ref 0–6)
EPI CELLS # UR: SIGNIFICANT CHANGE UP
FACT II CIRC INHIB PPP QL: 14.4 SEC — HIGH (ref 9.8–13.1)
FACT II CIRC INHIB PPP QL: 33.7 SEC — SIGNIFICANT CHANGE UP (ref 27.5–37.4)
FERRITIN SERPL-MCNC: 1928 NG/ML — HIGH (ref 15–150)
FIBRINOGEN PPP-MCNC: 1986 MG/DL — HIGH (ref 350–510)
FOLATE SERPL-MCNC: 12.2 NG/ML — SIGNIFICANT CHANGE UP (ref 4.7–20)
GIANT PLATELETS BLD QL SMEAR: PRESENT — SIGNIFICANT CHANGE UP
GLUCOSE BLDA-MCNC: 191 MG/DL — HIGH (ref 70–99)
GLUCOSE BLDC GLUCOMTR-MCNC: 159 MG/DL — HIGH (ref 70–99)
GLUCOSE BLDC GLUCOMTR-MCNC: 180 MG/DL — HIGH (ref 70–99)
GLUCOSE BLDC GLUCOMTR-MCNC: 193 MG/DL — HIGH (ref 70–99)
GLUCOSE BLDC GLUCOMTR-MCNC: 205 MG/DL — HIGH (ref 70–99)
GLUCOSE SERPL-MCNC: 160 MG/DL — HIGH (ref 70–99)
GLUCOSE SERPL-MCNC: 199 MG/DL — HIGH (ref 70–99)
GLUCOSE UR-MCNC: NEGATIVE — SIGNIFICANT CHANGE UP
HCO3 BLDA-SCNC: 21 MMOL/L — LOW (ref 22–26)
HCT VFR BLD CALC: 19.6 % — CRITICAL LOW (ref 34.5–45)
HCT VFR BLD CALC: 24.7 % — LOW (ref 34.5–45)
HCT VFR BLDA CALC: 25 % — LOW (ref 34.5–46.5)
HGB BLD-MCNC: 6.6 G/DL — CRITICAL LOW (ref 11.5–15.5)
HGB BLD-MCNC: 8.2 G/DL — LOW (ref 11.5–15.5)
HGB BLDA-MCNC: 8 G/DL — LOW (ref 11.5–15.5)
HYPOCHROMIA BLD QL: SLIGHT — SIGNIFICANT CHANGE UP
IMM GRANULOCYTES NFR BLD AUTO: 11.1 % — HIGH (ref 0–1.5)
INR BLD: 1.67 — HIGH (ref 0.88–1.17)
INR BLD: 1.84 — HIGH (ref 0.88–1.17)
INR BLD: 1.84 — HIGH (ref 0.88–1.17)
IRON SATN MFR SERPL: 11 UG/DL — LOW (ref 30–160)
IRON SATN MFR SERPL: 133 UG/DL — LOW (ref 140–530)
KETONES UR-MCNC: NEGATIVE — SIGNIFICANT CHANGE UP
LACTATE BLDA-SCNC: 1.4 MMOL/L — SIGNIFICANT CHANGE UP (ref 0.5–2)
LEUKOCYTE ESTERASE UR-ACNC: NEGATIVE — SIGNIFICANT CHANGE UP
LYMPHOCYTES # BLD AUTO: 0.08 K/UL — LOW (ref 1–3.3)
LYMPHOCYTES # BLD AUTO: 44.4 % — HIGH (ref 13–44)
LYMPHOCYTES NFR SPEC AUTO: 73.3 % — HIGH (ref 13–44)
MAGNESIUM SERPL-MCNC: 2.4 MG/DL — SIGNIFICANT CHANGE UP (ref 1.6–2.6)
MAGNESIUM SERPL-MCNC: 2.4 MG/DL — SIGNIFICANT CHANGE UP (ref 1.6–2.6)
MCHC RBC-ENTMCNC: 27.7 PG — SIGNIFICANT CHANGE UP (ref 27–34)
MCHC RBC-ENTMCNC: 28.1 PG — SIGNIFICANT CHANGE UP (ref 27–34)
MCHC RBC-ENTMCNC: 33.2 % — SIGNIFICANT CHANGE UP (ref 32–36)
MCHC RBC-ENTMCNC: 33.7 % — SIGNIFICANT CHANGE UP (ref 32–36)
MCV RBC AUTO: 83.4 FL — SIGNIFICANT CHANGE UP (ref 80–100)
MCV RBC AUTO: 83.4 FL — SIGNIFICANT CHANGE UP (ref 80–100)
METAMYELOCYTES # FLD: 0 % — SIGNIFICANT CHANGE UP (ref 0–1)
MICROCYTES BLD QL: SLIGHT — SIGNIFICANT CHANGE UP
MONOCYTES # BLD AUTO: 0.07 K/UL — SIGNIFICANT CHANGE UP (ref 0–0.9)
MONOCYTES NFR BLD AUTO: 38.9 % — HIGH (ref 2–14)
MONOCYTES NFR BLD: 13.3 % — HIGH (ref 2–9)
MYELOCYTES NFR BLD: 0 % — SIGNIFICANT CHANGE UP (ref 0–0)
NEUTROPHIL AB SER-ACNC: 0 % — LOW (ref 43–77)
NEUTROPHILS # BLD AUTO: 0.01 K/UL — LOW (ref 1.8–7.4)
NEUTROPHILS NFR BLD AUTO: 5.6 % — LOW (ref 43–77)
NEUTS BAND # BLD: 0 % — SIGNIFICANT CHANGE UP (ref 0–6)
NITRITE UR-MCNC: NEGATIVE — SIGNIFICANT CHANGE UP
NRBC # BLD: 20 /100WBC — SIGNIFICANT CHANGE UP
NRBC # FLD: 0 K/UL — LOW (ref 25–125)
NRBC # FLD: 0 K/UL — LOW (ref 25–125)
OSMOLALITY UR: 342 MOSMO/KG — SIGNIFICANT CHANGE UP (ref 50–1200)
OTHER - HEMATOLOGY %: 0 — SIGNIFICANT CHANGE UP
OVALOCYTES BLD QL SMEAR: SLIGHT — SIGNIFICANT CHANGE UP
PCO2 BLDA: 29 MMHG — LOW (ref 32–48)
PH BLDA: 7.44 PH — SIGNIFICANT CHANGE UP (ref 7.35–7.45)
PH UR: 6 — SIGNIFICANT CHANGE UP (ref 5–8)
PHOSPHATE SERPL-MCNC: 3.5 MG/DL — SIGNIFICANT CHANGE UP (ref 2.5–4.5)
PHOSPHATE SERPL-MCNC: 4 MG/DL — SIGNIFICANT CHANGE UP (ref 2.5–4.5)
PLATELET # BLD AUTO: 104 K/UL — LOW (ref 150–400)
PLATELET # BLD AUTO: 106 K/UL — LOW (ref 150–400)
PLATELET COUNT - ESTIMATE: SIGNIFICANT CHANGE UP
PMV BLD: 10.3 FL — SIGNIFICANT CHANGE UP (ref 7–13)
PMV BLD: 10.4 FL — SIGNIFICANT CHANGE UP (ref 7–13)
PO2 BLDA: 69 MMHG — LOW (ref 83–108)
POIKILOCYTOSIS BLD QL AUTO: SLIGHT — SIGNIFICANT CHANGE UP
POTASSIUM BLDA-SCNC: 3.3 MMOL/L — LOW (ref 3.4–4.5)
POTASSIUM SERPL-MCNC: 3.5 MMOL/L — SIGNIFICANT CHANGE UP (ref 3.5–5.3)
POTASSIUM SERPL-MCNC: 3.7 MMOL/L — SIGNIFICANT CHANGE UP (ref 3.5–5.3)
POTASSIUM SERPL-SCNC: 3.5 MMOL/L — SIGNIFICANT CHANGE UP (ref 3.5–5.3)
POTASSIUM SERPL-SCNC: 3.7 MMOL/L — SIGNIFICANT CHANGE UP (ref 3.5–5.3)
POTASSIUM UR-SCNC: 44.3 MMOL/L — SIGNIFICANT CHANGE UP
PROMYELOCYTES # FLD: 0 % — SIGNIFICANT CHANGE UP (ref 0–0)
PROT SERPL-MCNC: 6.5 G/DL — SIGNIFICANT CHANGE UP (ref 6–8.3)
PROT SERPL-MCNC: 6.8 G/DL — SIGNIFICANT CHANGE UP (ref 6–8.3)
PROT UR-MCNC: 100 — HIGH
PROTHROM AB SERPL-ACNC: 19.3 SEC — HIGH (ref 9.8–13.1)
PROTHROM AB SERPL-ACNC: 20.8 SEC — HIGH (ref 9.8–13.1)
PROTHROM AB SERPL-ACNC: 20.8 SEC — HIGH (ref 9.8–13.1)
PROTHROMBIN TIME/NOMAL: 11.7 SEC — SIGNIFICANT CHANGE UP (ref 9.8–13.1)
PROTHROMBIN TIME/NOMAL: 32.7 SEC — SIGNIFICANT CHANGE UP (ref 27.5–37.4)
PT INHIB SC 2 HR: 15.7 SEC — HIGH (ref 9.8–13.1)
PTT INHIB SC 2 HR: 39.4 SEC — HIGH (ref 27.5–37.4)
RBC # BLD: 2.35 M/UL — LOW (ref 3.8–5.2)
RBC # BLD: 2.96 M/UL — LOW (ref 3.8–5.2)
RBC # FLD: 17.6 % — HIGH (ref 10.3–14.5)
RBC # FLD: 18.9 % — HIGH (ref 10.3–14.5)
RBC CASTS # UR COMP ASSIST: SIGNIFICANT CHANGE UP (ref 0–?)
RH IG SCN BLD-IMP: POSITIVE — SIGNIFICANT CHANGE UP
SAO2 % BLDA: 94.1 % — LOW (ref 95–99)
SODIUM BLDA-SCNC: 134 MMOL/L — LOW (ref 136–146)
SODIUM SERPL-SCNC: 138 MMOL/L — SIGNIFICANT CHANGE UP (ref 135–145)
SODIUM SERPL-SCNC: 138 MMOL/L — SIGNIFICANT CHANGE UP (ref 135–145)
SODIUM UR-SCNC: 38 MMOL/L — SIGNIFICANT CHANGE UP
SP GR SPEC: 1.02 — SIGNIFICANT CHANGE UP (ref 1–1.04)
SPECIMEN SOURCE: SIGNIFICANT CHANGE UP
SPECIMEN SOURCE: SIGNIFICANT CHANGE UP
TROPONIN T, HIGH SENSITIVITY: 20 NG/L — SIGNIFICANT CHANGE UP (ref ?–14)
UIBC SERPL-MCNC: 122.2 UG/DL — SIGNIFICANT CHANGE UP (ref 110–370)
UROBILINOGEN FLD QL: NORMAL — SIGNIFICANT CHANGE UP
UUN UR-MCNC: 421.3 MG/DL — SIGNIFICANT CHANGE UP
VANCOMYCIN FLD-MCNC: 7.7 UG/ML — SIGNIFICANT CHANGE UP
VARIANT LYMPHS # BLD: 0 % — SIGNIFICANT CHANGE UP
WBC # BLD: 0.18 K/UL — CRITICAL LOW (ref 3.8–10.5)
WBC # BLD: 0.26 K/UL — CRITICAL LOW (ref 3.8–10.5)
WBC # FLD AUTO: 0.18 K/UL — CRITICAL LOW (ref 3.8–10.5)
WBC # FLD AUTO: 0.26 K/UL — CRITICAL LOW (ref 3.8–10.5)
WBC UR QL: SIGNIFICANT CHANGE UP (ref 0–?)

## 2019-02-06 PROCEDURE — 99223 1ST HOSP IP/OBS HIGH 75: CPT

## 2019-02-06 PROCEDURE — 71045 X-RAY EXAM CHEST 1 VIEW: CPT | Mod: 26

## 2019-02-06 PROCEDURE — 99233 SBSQ HOSP IP/OBS HIGH 50: CPT | Mod: GC

## 2019-02-06 RX ORDER — ACETAMINOPHEN 500 MG
650 TABLET ORAL ONCE
Qty: 0 | Refills: 0 | Status: COMPLETED | OUTPATIENT
Start: 2019-02-06 | End: 2019-02-06

## 2019-02-06 RX ORDER — VANCOMYCIN HCL 1 G
1000 VIAL (EA) INTRAVENOUS EVERY 24 HOURS
Qty: 0 | Refills: 0 | Status: DISCONTINUED | OUTPATIENT
Start: 2019-02-07 | End: 2019-02-07

## 2019-02-06 RX ORDER — POTASSIUM CHLORIDE 20 MEQ
10 PACKET (EA) ORAL
Qty: 0 | Refills: 0 | Status: COMPLETED | OUTPATIENT
Start: 2019-02-06 | End: 2019-02-06

## 2019-02-06 RX ORDER — POTASSIUM CHLORIDE 20 MEQ
40 PACKET (EA) ORAL ONCE
Qty: 0 | Refills: 0 | Status: COMPLETED | OUTPATIENT
Start: 2019-02-06 | End: 2019-02-06

## 2019-02-06 RX ORDER — VANCOMYCIN HCL 1 G
1000 VIAL (EA) INTRAVENOUS ONCE
Qty: 0 | Refills: 0 | Status: COMPLETED | OUTPATIENT
Start: 2019-02-06 | End: 2019-02-06

## 2019-02-06 RX ORDER — ACETAMINOPHEN 500 MG
1000 TABLET ORAL ONCE
Qty: 0 | Refills: 0 | Status: COMPLETED | OUTPATIENT
Start: 2019-02-06 | End: 2019-02-06

## 2019-02-06 RX ORDER — VANCOMYCIN HCL 1 G
VIAL (EA) INTRAVENOUS
Qty: 0 | Refills: 0 | Status: DISCONTINUED | OUTPATIENT
Start: 2019-02-06 | End: 2019-02-07

## 2019-02-06 RX ORDER — FILGRASTIM 480MCG/1.6
480 VIAL (ML) INJECTION DAILY
Qty: 0 | Refills: 0 | Status: DISCONTINUED | OUTPATIENT
Start: 2019-02-06 | End: 2019-02-08

## 2019-02-06 RX ORDER — SODIUM CHLORIDE 9 MG/ML
1000 INJECTION, SOLUTION INTRAVENOUS
Qty: 0 | Refills: 0 | Status: DISCONTINUED | OUTPATIENT
Start: 2019-02-06 | End: 2019-02-07

## 2019-02-06 RX ORDER — PHYTONADIONE (VIT K1) 5 MG
2.5 TABLET ORAL ONCE
Qty: 0 | Refills: 0 | Status: DISCONTINUED | OUTPATIENT
Start: 2019-02-06 | End: 2019-02-06

## 2019-02-06 RX ORDER — CEFEPIME 1 G/1
2000 INJECTION, POWDER, FOR SOLUTION INTRAMUSCULAR; INTRAVENOUS DAILY
Qty: 0 | Refills: 0 | Status: DISCONTINUED | OUTPATIENT
Start: 2019-02-06 | End: 2019-02-07

## 2019-02-06 RX ORDER — MAGNESIUM SULFATE 500 MG/ML
2 VIAL (ML) INJECTION ONCE
Qty: 0 | Refills: 0 | Status: COMPLETED | OUTPATIENT
Start: 2019-02-06 | End: 2019-02-06

## 2019-02-06 RX ADMIN — Medication 100 MILLIEQUIVALENT(S): at 07:09

## 2019-02-06 RX ADMIN — Medication 100 MILLIEQUIVALENT(S): at 10:46

## 2019-02-06 RX ADMIN — AZITHROMYCIN 250 MILLIGRAM(S): 500 TABLET, FILM COATED ORAL at 18:40

## 2019-02-06 RX ADMIN — Medication 650 MILLIGRAM(S): at 06:00

## 2019-02-06 RX ADMIN — HEPARIN SODIUM 5000 UNIT(S): 5000 INJECTION INTRAVENOUS; SUBCUTANEOUS at 05:56

## 2019-02-06 RX ADMIN — Medication 50 GRAM(S): at 05:56

## 2019-02-06 RX ADMIN — Medication 325 MILLIGRAM(S): at 02:51

## 2019-02-06 RX ADMIN — Medication 650 MILLIGRAM(S): at 05:55

## 2019-02-06 RX ADMIN — Medication 1: at 12:38

## 2019-02-06 RX ADMIN — Medication 100 MILLIEQUIVALENT(S): at 09:19

## 2019-02-06 RX ADMIN — Medication 650 MILLIGRAM(S): at 04:45

## 2019-02-06 RX ADMIN — SODIUM CHLORIDE 100 MILLILITER(S): 9 INJECTION, SOLUTION INTRAVENOUS at 09:33

## 2019-02-06 RX ADMIN — Medication 975 MILLIGRAM(S): at 08:15

## 2019-02-06 RX ADMIN — Medication 40 MILLIEQUIVALENT(S): at 05:55

## 2019-02-06 RX ADMIN — Medication 400 MILLIGRAM(S): at 23:20

## 2019-02-06 RX ADMIN — Medication 1: at 18:59

## 2019-02-06 RX ADMIN — Medication 250 MILLIGRAM(S): at 14:28

## 2019-02-06 RX ADMIN — CEFEPIME 100 MILLIGRAM(S): 1 INJECTION, POWDER, FOR SOLUTION INTRAMUSCULAR; INTRAVENOUS at 19:30

## 2019-02-06 RX ADMIN — Medication 1: at 09:27

## 2019-02-06 RX ADMIN — Medication 600 MILLIGRAM(S): at 08:15

## 2019-02-06 RX ADMIN — Medication 480 MICROGRAM(S): at 18:40

## 2019-02-06 RX ADMIN — SODIUM CHLORIDE 100 MILLILITER(S): 9 INJECTION, SOLUTION INTRAVENOUS at 10:46

## 2019-02-06 NOTE — PROGRESS NOTE ADULT - PROBLEM SELECTOR PLAN 7
-On home glimepride 1 mg, will hold   -serum glucose 251  -start low dose insulin sliding scale with FS premeal and qhs  -check HbA1C. -On home glimepride 1 mg, will hold   -serum glucose 170  -start low dose insulin sliding scale with FS premeal and qhs  -check HbA1C. -HbA1C 7.6  -On home glimepride 1 mg, will hold   -serum glucose 170  -start low dose insulin sliding scale with FS premeal and qhs

## 2019-02-06 NOTE — ED ADULT NURSE REASSESSMENT NOTE - NS ED NURSE REASSESS COMMENT FT1
Received patient report at 08:30 a.m., VSS and in NAD.  A.M., labs drawn this A.M., via med port.  Patient plan is for possible blood transfusion d/t low H&H.  No active bleeding.  Denies any SOB or dizziness.  Patient admitted to tele and awaiting room assignment.  Breakfast tolerated this A.M., patient educated that stool and urine specimen is needed.  All equipment and education provided to patient in order to fulfill request.  Will continue to monitor patient closely.  Angeline LIGHT

## 2019-02-06 NOTE — CONSULT NOTE ADULT - ASSESSMENT
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     # Sepsis: 2/2 pneumonia vs. diarrhea  - cover with broad spectrum antibiotics  - follow-up cultures  - if concerned about pneumonia, please check CT Chest    # Breast cancer: TNBC, dx 2018, s/p lumpectomy, s/p 4 cycles of cyclophosphamide/docetaxel  - pancytopenia likely due to chemotherapy, ANC 0.01  - agree with vancomycin and cefepime while febrile  - can give neupogen 480mcg while febrile and ANC under 1000      Hannah Briseno MD  Hematology/Oncology Fellow, PGY-4  pager: [phone]  After 5pm or on weekends, please page the on-call fellow. 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     # Sepsis: 2/2 pneumonia vs. diarrhea  - cover with broad spectrum antibiotics  - follow-up cultures    # Breast cancer: TNBC, dx 2018, s/p lumpectomy, s/p 4 cycles of cyclophosphamide/docetaxel  - pancytopenia likely due to chemotherapy, ANC 0.01  - agree with vancomycin and cefepime while febrile  - can give neupogen 480mcg while febrile and ANC under 1000      Hannah Briseno MD  Hematology/Oncology Fellow, PGY-4  pager: [phone]  After 5pm or on weekends, please page the on-call fellow.

## 2019-02-06 NOTE — PROGRESS NOTE ADULT - ATTENDING COMMENTS
Patient was physically seen and examined by me. I agree with above plan and findings.  Sepsis likely d/t HCAP, continue IV Vanco/Cefepime/Zithromax, f/up cultures and urine legionella   Diarrhea, f/up stool studies and Cdiff   Chemo induced pancytopenia, transfused 1 unit PRBC, may start neupogen as per Onc, monitor CBC  ISABELLA likely d/t ATNl, IVF, monitor Cr  Syncope likely d/t hypovolemia, IVF, check orthostatics and TTE

## 2019-02-06 NOTE — PROGRESS NOTE ADULT - PROBLEM SELECTOR PLAN 8
-Hx of malignant neoplasm of right female breast and intraductal carcinoma in situ of left breast) on chemo (last treatment 9 days ago, final treatment of cyclophosphamide)  -Spoke with Dr. Ruiz ([phone]), does not come to [facility]   -Will get in contact with [facility] -Hx of malignant neoplasm of right female breast and intraductal carcinoma in situ of left breast) on chemo (last treatment 9 days ago, final treatment of cyclophosphamide and docetaxel)  -Spoke with Dr. Ruiz ([phone]), does not come to [facility], although suggests starting Neupogen  -Will get in contact with [place]

## 2019-02-06 NOTE — PROGRESS NOTE ADULT - PROBLEM SELECTOR PLAN 2
-RLL opacity on CXR   -POCUS- intermittent B lines at Rt LL, no consolidation   -continue IV Vanco and Cefepime for possible MRSA or gram negative PNA  -add Zithromax for atypical coverage   -f/up blood cultures, sputum culture if cough productive   -check urine legionella. -RLL opacity on CXR   -POCUS- intermittent B lines at Rt LL, no consolidation   -continue IV Vanco and Cefepime for possible MRSA or gram negative PNA  -added Zithromax for atypical coverage   -f/up blood cultures, sputum culture if cough productive   -check urine legionella, can d/c Zithromax if negative

## 2019-02-06 NOTE — PROGRESS NOTE ADULT - PROBLEM SELECTOR PLAN 9
Hx of HTN, on home amlodipine-benazepril 10 mg-40 mg   -BP stable (SBP 130s)  -hold home BP meds in setting of sepsis/volume depletion/syncope  -monitor BP.

## 2019-02-06 NOTE — PROGRESS NOTE ADULT - SUBJECTIVE AND OBJECTIVE BOX
Called for patient w/ neutropenic fever thought 2/2 PNA, now w/ persistent fever, T 101.4 -> 100.6. Other VS , /49. Currently on vanc (by level in stg of ISABELLA), cefepime, and azithromycin. Pt received Tylenol at 2:44AM. Pt had repeat BMP demonstrating improving SCr, in stg of improvement will check vanc trough to determine if pt requires additional dose of vanc (last dose around 1PM on 2/5). SUBJECTIVE: The Pt had persistent fevers overnight, T 101.4 @ 2:44 -> 100.6 @ 4:35 -> 100.3 @ 7:11. Other VS also concerning for tachycardia (112->105->102). Respiratory rate on exam =24 bpm and SpO2 was 96%. Currently on vanc (by level in stg of ISABELLA), cefepime, and azithromycin. Pt received Tylenol at ~3am and ~6pm. Pt notes several episodes of diarrhea overnight. Urinating well. She notes she has a decreased appetite with little oral intake over the past day, although will try eating today. Denies pain, n/v, dizziness, chest pain, palpitations.     ROS:  	General: +fever, +chills  	HEENT: denies nasal congestion, sore throat, rhinorrhea  	Eyes: denies vision changes, blurry vision  	CV: denies chest pain, palpitations  	Resp: +SOB, +cough  	Abdominal: denies nausea, vomiting, +diarrhea, denies abdominal pain, blood in stool, dark stool  	: denies pain with urination/changes in urination  	MSK: denies recent trauma or muscle weakness  	Neuro: denies headaches, numbness, tingling, dizziness, lightheadedness.  	Skin: denies new rashes, lesions, bruises  	Endocrine: denies recent weight loss    MEDICATIONS  (STANDING):  azithromycin  IVPB      azithromycin  IVPB 500 milliGRAM(s) IV Intermittent every 24 hours  cefepime   IVPB 2000 milliGRAM(s) IV Intermittent daily  dextrose 5%. 1000 milliLiter(s) (50 mL/Hr) IV Continuous <Continuous>  dextrose 50% Injectable 12.5 Gram(s) IV Push once  dextrose 50% Injectable 25 Gram(s) IV Push once  dextrose 50% Injectable 25 Gram(s) IV Push once  heparin  Injectable 5000 Unit(s) SubCutaneous every 8 hours  insulin lispro (HumaLOG) corrective regimen sliding scale   SubCutaneous three times a day before meals  insulin lispro (HumaLOG) corrective regimen sliding scale   SubCutaneous at bedtime  lactated ringers. 1000 milliLiter(s) (100 mL/Hr) IV Continuous <Continuous>  potassium chloride  10 mEq/100 mL IVPB 10 milliEquivalent(s) IV Intermittent every 1 hour  acetaminophen   Tablet .. 650 milliGRAM(s) Oral every 6 hours PRN Mild Pain (1 - 3)  dextrose 40% Gel 15 Gram(s) Oral once PRN Blood Glucose LESS THAN 70 milliGRAM(s)/deciliter  glucagon  Injectable 1 milliGRAM(s) IntraMuscular once PRN Glucose LESS THAN 70 milligrams/deciliter SUBJECTIVE: The Pt had persistent fevers overnight, T 101.4 @ 2:44 -> 100.6 @ 4:35 -> 100.3 @ 7:11. Other VS also concerning for tachycardia (112->105->102). Respiratory rate on exam =24 bpm and SpO2 was 96%. Currently on vanc (by level in stg of ISABELLA), cefepime, and azithromycin. Pt received Tylenol at ~3am and ~6pm. Pt notes several episodes of diarrhea overnight. Urinating well. She notes she has a decreased appetite with little oral intake over the past day, although will try eating today. Denies pain, n/v, dizziness, chest pain, palpitations.     ROS:  	General: +fever, +chills  	HEENT: denies nasal congestion, sore throat, rhinorrhea  	Eyes: denies vision changes, blurry vision  	CV: denies chest pain, palpitations  	Resp: +SOB, +cough  	Abdominal: denies nausea, vomiting, +diarrhea, denies abdominal pain, blood in stool, dark stool  	: denies pain with urination/changes in urination  	MSK: denies recent trauma or muscle weakness  	Neuro: denies headaches, numbness, tingling, dizziness, lightheadedness.  	Skin: denies new rashes, lesions, bruises  	Endocrine: denies recent weight loss    MEDICATIONS  (STANDING):  azithromycin  IVPB      azithromycin  IVPB 500 milliGRAM(s) IV Intermittent every 24 hours  cefepime   IVPB 2000 milliGRAM(s) IV Intermittent daily  dextrose 5%. 1000 milliLiter(s) (50 mL/Hr) IV Continuous <Continuous>  dextrose 50% Injectable 12.5 Gram(s) IV Push once  dextrose 50% Injectable 25 Gram(s) IV Push once  dextrose 50% Injectable 25 Gram(s) IV Push once  heparin  Injectable 5000 Unit(s) SubCutaneous every 8 hours  insulin lispro (HumaLOG) corrective regimen sliding scale   SubCutaneous three times a day before meals  insulin lispro (HumaLOG) corrective regimen sliding scale   SubCutaneous at bedtime  lactated ringers. 1000 milliLiter(s) (100 mL/Hr) IV Continuous <Continuous>  potassium chloride  10 mEq/100 mL IVPB 10 milliEquivalent(s) IV Intermittent every 1 hour  acetaminophen   Tablet .. 650 milliGRAM(s) Oral every 6 hours PRN Mild Pain (1 - 3)  dextrose 40% Gel 15 Gram(s) Oral once PRN Blood Glucose LESS THAN 70 milliGRAM(s)/deciliter  glucagon  Injectable 1 milliGRAM(s) IntraMuscular once PRN Glucose LESS THAN 70 milligrams/deciliter    OBJECTIVE:  Vital Signs Last 24 Hrs  T(C): 37.9 (06 Feb 2019 07:11), Max: 39.7 (05 Feb 2019 13:28)  T(F): 100.3 (06 Feb 2019 07:11), Max: 103.4 (05 Feb 2019 13:28)  HR: 102 (06 Feb 2019 08:47) (92 - 124)  BP: 136/70 (06 Feb 2019 08:47) (98/66 - 161/73)  BP(mean): --  RR: 16 (06 Feb 2019 08:47) (16 - 20)  SpO2: 99% (06 Feb 2019 08:47) (96% - 100%)    PE:  CONSTITUTIONAL: elderly female, resting in bed, mild respiratory distress  HEAD: Normocephalic; atraumatic  EYES: EOMI, PERRLA  ENMT: External appears normal; normal oropharynx, dry mucus membranes  NECK: Supple; non-tender; no cervical lymphadenopathy, FROM  CARD: tachycardic, normal S1S2, no audible murmurs, rubs, or gallops  RESP: Increased respiratory effort without substernal/intercostal retractions, clear to ausc b/l,  Rt sided port nonerythematous   ABD: Soft, non-distended; non-tender; no rebound or guarding  EXT: No LE pitting edema or calf tenderness; distal pulses intact with good capillary refill. No cyanosis   VASC: 2+ distal pulses in extremities   SKIN: Warm, dry, intact, no rashes or lesions  NEURO: AAOx3, CN II-IX intact, 5/5 strength b/l UE and LE, sensation intact in all extremities, ambulates with a steady gait,                          7.3    0.35  )-----------( 109      ( 05 Feb 2019 12:00 )             22.7   02-05    138  |  105  |  27<H>  ----------------------------<  215<H>  3.2<L>   |  19<L>  |  1.89<H>    Ca    7.7<L>      05 Feb 2019 21:25  Phos  3.9     02-05  Mg     1.4     02-05    TPro  5.5<L>  /  Alb  2.8<L>  /  TBili  0.6  /  DBili  x   /  AST  20  /  ALT  15  /  AlkPhos  43  02-05    FE studies: Fe 11, TIBC 133, [place] 1928    Vanco Trough @6am 7.7    RVP- negative SUBJECTIVE: The Pt had persistent fevers overnight, T 101.4 @ 2:44 -> 100.6 @ 4:35 -> 100.3 @ 7:11. Other VS also concerning for tachycardia (112->105->102). Respiratory rate on exam =24 bpm and SpO2 was 96%. Currently on vanc (by level in stg of ISABELLA), cefepime, and azithromycin. Pt received Tylenol at ~3am and ~6pm. Pt notes several episodes of diarrhea overnight. Urinating well. She notes she has a decreased appetite with little oral intake over the past day, although will try eating today. Denies pain, n/v, dizziness, chest pain, palpitations.     ROS:  	General: +fever, +chills  	HEENT: denies nasal congestion, sore throat, rhinorrhea  	Eyes: denies vision changes, blurry vision  	CV: denies chest pain, palpitations  	Resp: +SOB, +cough  	Abdominal: denies nausea, vomiting, +diarrhea, denies abdominal pain, blood in stool, dark stool  	: denies pain with urination/changes in urination  	MSK: denies recent trauma or muscle weakness  	Neuro: denies headaches, numbness, tingling, dizziness, lightheadedness.  	Skin: denies new rashes, lesions, bruises  	Endocrine: denies recent weight loss    MEDICATIONS  (STANDING):  azithromycin  IVPB      azithromycin  IVPB 500 milliGRAM(s) IV Intermittent every 24 hours  cefepime   IVPB 2000 milliGRAM(s) IV Intermittent daily  dextrose 5%. 1000 milliLiter(s) (50 mL/Hr) IV Continuous <Continuous>  dextrose 50% Injectable 12.5 Gram(s) IV Push once  dextrose 50% Injectable 25 Gram(s) IV Push once  dextrose 50% Injectable 25 Gram(s) IV Push once  heparin  Injectable 5000 Unit(s) SubCutaneous every 8 hours  insulin lispro (HumaLOG) corrective regimen sliding scale   SubCutaneous three times a day before meals  insulin lispro (HumaLOG) corrective regimen sliding scale   SubCutaneous at bedtime  lactated ringers. 1000 milliLiter(s) (100 mL/Hr) IV Continuous <Continuous>  potassium chloride  10 mEq/100 mL IVPB 10 milliEquivalent(s) IV Intermittent every 1 hour  acetaminophen   Tablet .. 650 milliGRAM(s) Oral every 6 hours PRN Mild Pain (1 - 3)  dextrose 40% Gel 15 Gram(s) Oral once PRN Blood Glucose LESS THAN 70 milliGRAM(s)/deciliter  glucagon  Injectable 1 milliGRAM(s) IntraMuscular once PRN Glucose LESS THAN 70 milligrams/deciliter    OBJECTIVE:  Vital Signs Last 24 Hrs  T(C): 37.9 (06 Feb 2019 07:11), Max: 39.7 (05 Feb 2019 13:28)  T(F): 100.3 (06 Feb 2019 07:11), Max: 103.4 (05 Feb 2019 13:28)  HR: 102 (06 Feb 2019 08:47) (92 - 124)  BP: 136/70 (06 Feb 2019 08:47) (98/66 - 161/73)  BP(mean): --  RR: 16 (06 Feb 2019 08:47) (16 - 20)  SpO2: 99% (06 Feb 2019 08:47) (96% - 100%)    PE:  CONSTITUTIONAL: elderly female, resting in bed, mild respiratory distress  HEAD: Normocephalic; atraumatic  EYES: EOMI, PERRLA  ENMT: External appears normal; normal oropharynx, dry mucus membranes  NECK: Supple; non-tender; no cervical lymphadenopathy, FROM  CARD: tachycardic, normal S1S2, no audible murmurs, rubs, or gallops  RESP: Increased respiratory effort without substernal/intercostal retractions, clear to ausc b/l,  Rt sided port nonerythematous   ABD: Soft, non-distended; non-tender; no rebound or guarding  EXT: No LE pitting edema or calf tenderness; distal pulses intact with good capillary refill. No cyanosis   VASC: 2+ distal pulses in extremities   SKIN: Warm, dry, intact, no rashes or lesions  NEURO: AAOx3, CN II-IX intact, 5/5 strength b/l UE and LE, sensation intact in all extremities, ambulates with a steady gait,                          6.6    0.18  )-----------( 104      ( 06 Feb 2019 08:30 )             19.6   02-06    138  |  103  |  33<H>  ----------------------------<  160<H>  3.7   |  21<L>  |  1.96<H>    Ca    9.0      06 Feb 2019 08:30  Phos  4.0     02-06  Mg     2.4     02-06    TPro  6.5  /  Alb  3.3  /  TBili  0.7  /  DBili  x   /  AST  27  /  ALT  20  /  AlkPhos  52  02-06    FE studies: Fe 11, TIBC 133, [place] 1928    Vanco Trough @6am 7.7    RVP- negative SUBJECTIVE: The Pt had persistent fevers overnight, T 101.4 @ 2:44 -> 100.6 @ 4:35 -> 100.3 @ 7:11. Other VS also concerning for tachycardia (112->105->102). Respiratory rate on exam =24 bpm and SpO2 was 96%. Currently on vanc (by level in stg of ISABELLA), cefepime, and azithromycin. Pt received Tylenol at ~3am and ~6pm. Pt notes several episodes of diarrhea overnight. Urinating well. She notes she has a decreased appetite with little oral intake over the past day, although will try eating today. Denies pain, n/v, dizziness, chest pain, palpitations.     ROS:  	General: +fever, +chills  	HEENT: denies nasal congestion, sore throat, rhinorrhea  	Eyes: denies vision changes, blurry vision  	CV: denies chest pain, palpitations  	Resp: +SOB, +cough  	Abdominal: denies nausea, vomiting, +diarrhea, denies abdominal pain, blood in stool, dark stool  	: denies pain with urination/changes in urination  	MSK: denies recent trauma or muscle weakness  	Neuro: denies headaches, numbness, tingling, dizziness, lightheadedness.  	Skin: denies new rashes, lesions, bruises  	Endocrine: denies recent weight loss    MEDICATIONS  (STANDING):  azithromycin  IVPB      azithromycin  IVPB 500 milliGRAM(s) IV Intermittent every 24 hours  cefepime   IVPB 2000 milliGRAM(s) IV Intermittent daily  dextrose 5%. 1000 milliLiter(s) (50 mL/Hr) IV Continuous <Continuous>  dextrose 50% Injectable 12.5 Gram(s) IV Push once  dextrose 50% Injectable 25 Gram(s) IV Push once  dextrose 50% Injectable 25 Gram(s) IV Push once  heparin  Injectable 5000 Unit(s) SubCutaneous every 8 hours  insulin lispro (HumaLOG) corrective regimen sliding scale   SubCutaneous three times a day before meals  insulin lispro (HumaLOG) corrective regimen sliding scale   SubCutaneous at bedtime  lactated ringers. 1000 milliLiter(s) (100 mL/Hr) IV Continuous <Continuous>  potassium chloride  10 mEq/100 mL IVPB 10 milliEquivalent(s) IV Intermittent every 1 hour  acetaminophen   Tablet .. 650 milliGRAM(s) Oral every 6 hours PRN Mild Pain (1 - 3)  dextrose 40% Gel 15 Gram(s) Oral once PRN Blood Glucose LESS THAN 70 milliGRAM(s)/deciliter  glucagon  Injectable 1 milliGRAM(s) IntraMuscular once PRN Glucose LESS THAN 70 milligrams/deciliter    OBJECTIVE:  Vital Signs Last 24 Hrs  T(C): 37.9 (06 Feb 2019 07:11), Max: 39.7 (05 Feb 2019 13:28)  T(F): 100.3 (06 Feb 2019 07:11), Max: 103.4 (05 Feb 2019 13:28)  HR: 102 (06 Feb 2019 08:47) (92 - 124)  BP: 136/70 (06 Feb 2019 08:47) (98/66 - 161/73)  BP(mean): --  RR: 16 (06 Feb 2019 08:47) (16 - 20)  SpO2: 99% (06 Feb 2019 08:47) (96% - 100%)    PE:  CONSTITUTIONAL: elderly female, resting in bed  HEAD: Normocephalic; atraumatic  EYES: EOMI, PERRLA  ENMT: External appears normal; normal oropharynx, dry mucus membranes  NECK: Supple; non-tender; no cervical lymphadenopathy, FROM  CARD: tachycardic, normal S1S2, no audible murmurs, rubs, or gallops  RESP: Increased respiratory effort without substernal/intercostal retractions, clear to ausc b/l,  Rt sided port nonerythematous   ABD: Soft, non-distended; non-tender; no rebound or guarding  EXT: No LE pitting edema or calf tenderness; distal pulses intact with good capillary refill. No cyanosis   VASC: 2+ distal pulses in extremities   SKIN: Warm, dry, intact, no rashes or lesions  NEURO: AAOx3, CN II-IX intact, 5/5 strength b/l UE and LE, sensation intact in all extremities, ambulates with a steady gait,                          6.6    0.18  )-----------( 104      ( 06 Feb 2019 08:30 )             19.6   02-06    138  |  103  |  33<H>  ----------------------------<  160<H>  3.7   |  21<L>  |  1.96<H>    Ca    9.0      06 Feb 2019 08:30  Phos  4.0     02-06  Mg     2.4     02-06    TPro  6.5  /  Alb  3.3  /  TBili  0.7  /  DBili  x   /  AST  27  /  ALT  20  /  AlkPhos  52  02-06    FE studies: Fe 11, TIBC 133, [place] 1928    Vanco Trough @6am 7.7    RVP- negative

## 2019-02-06 NOTE — ED ADULT NURSE REASSESSMENT NOTE - NS ED NURSE REASSESS COMMENT FT1
Patient given PO tylenol for fever, will reassess and monitor. Patient expressing no needs at this time.

## 2019-02-06 NOTE — CONSULT NOTE ADULT - SUBJECTIVE AND OBJECTIVE BOX
HPI:  74F w/ PMH of breast CA (dx with malignant neoplasm of right female breast and intraductal carcinoma in situ of left breast) on chemo (last treatment 8 days ago), CKD, HTN, DMT2 (not on home insulin) presenting s/p syncopal episode. Pt was found down and disorientated this AM by her daughter covered in feces, requiring assistance to stand up. Daughter denied abnormal movements, unknown if urinary incontinence, denied tongue bitting. The pt does not remember the events surrounding the fall, although believes she was probably getting out of bed based on location. When EMS arrived, she was hypoxic with SpO2 of 82% and A+Ox2. She has had 4 episodes of watery diarrhea, decreased appetite, and chills starting yesterday. She has also had a lingering dry cough for the past couple of days.     CXR with RLL opacity, concerning for pneumonia.  Tmax 103 in the ED.  Started on Vancomycin and cefepime.               PAST MEDICAL & SURGICAL HISTORY:  CKD (chronic kidney disease) stage 3, GFR 30-59 ml/min  Intraductal carcinoma in situ of left breast  Malignant neoplasm of right breast  Carpal tunnel syndrome on left  Ovarian cyst: right  Hypertension  Primary osteoarthritis of right knee  Primary osteoarthritis of left knee  Gout  Cataract: bilateral eyes  Diabetes mellitus, type 2  CKD (chronic kidney disease)  Spinal stenosis of lumbosacral region  S/P total knee arthroplasty, right: 9/12/18  S/P carpal tunnel release: left hand- 2007  Lipoma of chest wall: sx removal  Lipoma of neck: sx removal  History of right salpingo-oophorectomy: 1994  S/P appendectomy: 1994      Allergies  No Known Allergies      Intolerances  codeine (Nausea)      MEDICATIONS  (STANDING):  azithromycin  IVPB      azithromycin  IVPB 500 milliGRAM(s) IV Intermittent every 24 hours  cefepime   IVPB 2000 milliGRAM(s) IV Intermittent daily  dextrose 5%. 1000 milliLiter(s) (50 mL/Hr) IV Continuous <Continuous>  dextrose 50% Injectable 12.5 Gram(s) IV Push once  dextrose 50% Injectable 25 Gram(s) IV Push once  dextrose 50% Injectable 25 Gram(s) IV Push once  heparin  Injectable 5000 Unit(s) SubCutaneous every 8 hours  insulin lispro (HumaLOG) corrective regimen sliding scale   SubCutaneous three times a day before meals  insulin lispro (HumaLOG) corrective regimen sliding scale   SubCutaneous at bedtime  lactated ringers. 1000 milliLiter(s) (100 mL/Hr) IV Continuous <Continuous>  vancomycin  IVPB 1000 milliGRAM(s) IV Intermittent once  vancomycin  IVPB        MEDICATIONS  (PRN):  acetaminophen   Tablet .. 650 milliGRAM(s) Oral every 6 hours PRN Mild Pain (1 - 3)  dextrose 40% Gel 15 Gram(s) Oral once PRN Blood Glucose LESS THAN 70 milliGRAM(s)/deciliter  glucagon  Injectable 1 milliGRAM(s) IntraMuscular once PRN Glucose LESS THAN 70 milligrams/deciliter      FAMILY HISTORY:  No pertinent family history in first degree relatives      SOCIAL HISTORY: No EtOH, no tobacco    REVIEW OF SYSTEMS:    CONSTITUTIONAL: No weakness, fevers or chills  EYES/ENT: No visual changes;  No vertigo or throat pain   NECK: No pain or stiffness  RESPIRATORY: No cough, wheezing, hemoptysis; No shortness of breath  CARDIOVASCULAR: No chest pain or palpitations  GASTROINTESTINAL: No abdominal or epigastric pain. No nausea, vomiting, or hematemesis; No diarrhea or constipation. No melena or hematochezia.  GENITOURINARY: No dysuria, frequency or hematuria  NEUROLOGICAL: No numbness or weakness  SKIN: No itching, burning, rashes, or lesions   All other review of systems is negative unless indicated above.        T(F): 100.3 (02-06-19 @ 07:11), Max: 103.4 (02-05-19 @ 13:28)  HR: 102 (02-06-19 @ 08:47)  BP: 136/70 (02-06-19 @ 08:47)  RR: 16 (02-06-19 @ 08:47)  SpO2: 99% (02-06-19 @ 08:47)  Wt(kg): --    GENERAL: NAD, well-developed  HEAD:  Atraumatic, Normocephalic  EYES: EOMI, PERRLA, conjunctiva and sclera clear  NECK: Supple, No JVD  CHEST/LUNG: Clear to auscultation bilaterally; No wheeze  HEART: Regular rate and rhythm; No murmurs, rubs, or gallops  ABDOMEN: Soft, Nontender, Nondistended; Bowel sounds present  EXTREMITIES:  2+ Peripheral Pulses, No clubbing, cyanosis, or edema  NEUROLOGY: non-focal  SKIN: No rashes or lesions                          6.6    0.18  )-----------( 104      ( 06 Feb 2019 08:30 )             19.6       02-06    138  |  103  |  33<H>  ----------------------------<  160<H>  3.7   |  21<L>  |  1.96<H>    Ca    9.0      06 Feb 2019 08:30  Phos  4.0     02-06  Mg     2.4     02-06    TPro  6.5  /  Alb  3.3  /  TBili  0.7  /  DBili  x   /  AST  27  /  ALT  20  /  AlkPhos  52  02-06      Phosphorus Level, Serum: 4.0 mg/dL (02-06 @ 08:30)  Magnesium, Serum: 2.4 mg/dL (02-06 @ 08:30)  Magnesium, Serum: 1.4 mg/dL (02-05 @ 21:25)  Phosphorus Level, Serum: 3.9 mg/dL (02-05 @ 21:25)  Lactate Dehydrogenase, Serum: 167 U/L (02-05 @ 21:25)  Uric Acid, Serum: 9.4 mg/dL (02-05 @ 21:25)      PT/INR - ( 06 Feb 2019 08:30 )   PT: 20.8 SEC;   INR: 1.84          PTT - ( 06 Feb 2019 08:30 )  PTT:39.8 SEC HPI:  74F w/ PMH of breast CA (dx with malignant neoplasm of right female breast and intraductal carcinoma in situ of left breast) on chemo (last treatment 8 days ago), CKD, HTN, DMT2 (not on home insulin) presenting s/p syncopal episode. Pt was found down and disorientated this AM by her daughter covered in feces, requiring assistance to stand up. Daughter denied abnormal movements, unknown if urinary incontinence, denied tongue bitting. The pt does not remember the events surrounding the fall, although believes she was probably getting out of bed based on location. When EMS arrived, she was hypoxic with SpO2 of 82% and A+Ox2. She has had 4 episodes of watery diarrhea, decreased appetite, and chills starting yesterday. She has also had a lingering dry cough for the past couple of days.     CXR with RLL opacity, concerning for pneumonia.  Tmax 103 in the ED.  Started on Vancomycin and cefepime.     In terms of oncologic history, patient is treated by Dr. Gayle Ruiz ([facility]).  Soke with Dr. Ruiz, states that patient received cycle 4 of cyclophosphamide (145mg) and cyclophosphamide (1140mg) on 1/28, no further plans for chemotherapy, is due to begin radiation after this.  Has never become this neutropenic with prior cycles of chemotherapy.       PAST MEDICAL & SURGICAL HISTORY:  CKD (chronic kidney disease) stage 3, GFR 30-59 ml/min  Intraductal carcinoma in situ of left breast  Malignant neoplasm of right breast  Carpal tunnel syndrome on left  Ovarian cyst: right  Hypertension  Primary osteoarthritis of right knee  Primary osteoarthritis of left knee  Gout  Cataract: bilateral eyes  Diabetes mellitus, type 2  CKD (chronic kidney disease)  Spinal stenosis of lumbosacral region  S/P total knee arthroplasty, right: 9/12/18  S/P carpal tunnel release: left hand- 2007  Lipoma of chest wall: sx removal  Lipoma of neck: sx removal  History of right salpingo-oophorectomy: 1994  S/P appendectomy: 1994      Allergies  No Known Allergies      Intolerances  codeine (Nausea)      MEDICATIONS  (STANDING):  azithromycin  IVPB      azithromycin  IVPB 500 milliGRAM(s) IV Intermittent every 24 hours  cefepime   IVPB 2000 milliGRAM(s) IV Intermittent daily  dextrose 5%. 1000 milliLiter(s) (50 mL/Hr) IV Continuous <Continuous>  dextrose 50% Injectable 12.5 Gram(s) IV Push once  dextrose 50% Injectable 25 Gram(s) IV Push once  dextrose 50% Injectable 25 Gram(s) IV Push once  heparin  Injectable 5000 Unit(s) SubCutaneous every 8 hours  insulin lispro (HumaLOG) corrective regimen sliding scale   SubCutaneous three times a day before meals  insulin lispro (HumaLOG) corrective regimen sliding scale   SubCutaneous at bedtime  lactated ringers. 1000 milliLiter(s) (100 mL/Hr) IV Continuous <Continuous>  vancomycin  IVPB 1000 milliGRAM(s) IV Intermittent once  vancomycin  IVPB        MEDICATIONS  (PRN):  acetaminophen   Tablet .. 650 milliGRAM(s) Oral every 6 hours PRN Mild Pain (1 - 3)  dextrose 40% Gel 15 Gram(s) Oral once PRN Blood Glucose LESS THAN 70 milliGRAM(s)/deciliter  glucagon  Injectable 1 milliGRAM(s) IntraMuscular once PRN Glucose LESS THAN 70 milligrams/deciliter      FAMILY HISTORY:  No pertinent family history in first degree relatives      SOCIAL HISTORY: No EtOH, no tobacco    REVIEW OF SYSTEMS:    CONSTITUTIONAL: No weakness, fevers or chills  EYES/ENT: No visual changes;  No vertigo or throat pain   NECK: No pain or stiffness  RESPIRATORY: No cough, wheezing, hemoptysis; No shortness of breath  CARDIOVASCULAR: No chest pain or palpitations  GASTROINTESTINAL: No abdominal or epigastric pain. No nausea, vomiting, or hematemesis; No diarrhea or constipation. No melena or hematochezia.  GENITOURINARY: No dysuria, frequency or hematuria  NEUROLOGICAL: No numbness or weakness  SKIN: No itching, burning, rashes, or lesions   All other review of systems is negative unless indicated above.        T(F): 100.3 (02-06-19 @ 07:11), Max: 103.4 (02-05-19 @ 13:28)  HR: 102 (02-06-19 @ 08:47)  BP: 136/70 (02-06-19 @ 08:47)  RR: 16 (02-06-19 @ 08:47)  SpO2: 99% (02-06-19 @ 08:47)  Wt(kg): --    GENERAL: NAD, well-developed  HEAD:  Atraumatic, Normocephalic  EYES: EOMI, PERRLA, conjunctiva and sclera clear  NECK: Supple, No JVD  CHEST/LUNG: Clear to auscultation bilaterally; No wheeze  HEART: Regular rate and rhythm; No murmurs, rubs, or gallops  ABDOMEN: Soft, Nontender, Nondistended; Bowel sounds present  EXTREMITIES:  2+ Peripheral Pulses, No clubbing, cyanosis, or edema  NEUROLOGY: non-focal  SKIN: No rashes or lesions                          6.6    0.18  )-----------( 104      ( 06 Feb 2019 08:30 )             19.6       02-06    138  |  103  |  33<H>  ----------------------------<  160<H>  3.7   |  21<L>  |  1.96<H>    Ca    9.0      06 Feb 2019 08:30  Phos  4.0     02-06  Mg     2.4     02-06    TPro  6.5  /  Alb  3.3  /  TBili  0.7  /  DBili  x   /  AST  27  /  ALT  20  /  AlkPhos  52  02-06      Phosphorus Level, Serum: 4.0 mg/dL (02-06 @ 08:30)  Magnesium, Serum: 2.4 mg/dL (02-06 @ 08:30)  Magnesium, Serum: 1.4 mg/dL (02-05 @ 21:25)  Phosphorus Level, Serum: 3.9 mg/dL (02-05 @ 21:25)  Lactate Dehydrogenase, Serum: 167 U/L (02-05 @ 21:25)  Uric Acid, Serum: 9.4 mg/dL (02-05 @ 21:25)      PT/INR - ( 06 Feb 2019 08:30 )   PT: 20.8 SEC;   INR: 1.84          PTT - ( 06 Feb 2019 08:30 )  PTT:39.8 SEC HPI:  74F w/ PMH of breast CA (dx with malignant neoplasm of right female breast and intraductal carcinoma in situ of left breast) on chemo (last treatment 8 days ago), CKD, HTN, DMT2 (not on home insulin) presenting s/p syncopal episode. Pt was found down and disorientated this AM by her daughter covered in feces, requiring assistance to stand up. Daughter denied abnormal movements, unknown if urinary incontinence, denied tongue bitting. The pt does not remember the events surrounding the fall, although believes she was probably getting out of bed based on location. When EMS arrived, she was hypoxic with SpO2 of 82% and A+Ox2. She has had 4 episodes of watery diarrhea, decreased appetite, and chills starting yesterday. She has also had a lingering dry cough for the past couple of days.     CXR with RLL opacity, concerning for pneumonia.  Tmax 103 in the ED.  Started on Vancomycin and cefepime.     In terms of oncologic history, patient is treated by Dr. Gayle Ruiz ([facility]).  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    Spoke with Dr. Ruiz, states that patient received cycle 4 of cyclophosphamide (145mg) and cyclophosphamide (1140mg) on 1/28, no further plans for chemotherapy, is due to begin radiation after this.  Has never become this neutropenic with prior cycles of chemotherapy.       PAST MEDICAL & SURGICAL HISTORY:  CKD (chronic kidney disease) stage 3, GFR 30-59 ml/min  Intraductal carcinoma in situ of left breast  Malignant neoplasm of right breast  Carpal tunnel syndrome on left  Ovarian cyst: right  Hypertension  Primary osteoarthritis of right knee  Primary osteoarthritis of left knee  Gout  Cataract: bilateral eyes  Diabetes mellitus, type 2  CKD (chronic kidney disease)  Spinal stenosis of lumbosacral region  S/P total knee arthroplasty, right: 9/12/18  S/P carpal tunnel release: left hand- 2007  Lipoma of chest wall: sx removal  Lipoma of neck: sx removal  History of right salpingo-oophorectomy: 1994  S/P appendectomy: 1994      Allergies  No Known Allergies      Intolerances  codeine (Nausea)      MEDICATIONS  (STANDING):  azithromycin  IVPB      azithromycin  IVPB 500 milliGRAM(s) IV Intermittent every 24 hours  cefepime   IVPB 2000 milliGRAM(s) IV Intermittent daily  dextrose 5%. 1000 milliLiter(s) (50 mL/Hr) IV Continuous <Continuous>  dextrose 50% Injectable 12.5 Gram(s) IV Push once  dextrose 50% Injectable 25 Gram(s) IV Push once  dextrose 50% Injectable 25 Gram(s) IV Push once  heparin  Injectable 5000 Unit(s) SubCutaneous every 8 hours  insulin lispro (HumaLOG) corrective regimen sliding scale   SubCutaneous three times a day before meals  insulin lispro (HumaLOG) corrective regimen sliding scale   SubCutaneous at bedtime  lactated ringers. 1000 milliLiter(s) (100 mL/Hr) IV Continuous <Continuous>  vancomycin  IVPB 1000 milliGRAM(s) IV Intermittent once  vancomycin  IVPB        MEDICATIONS  (PRN):  acetaminophen   Tablet .. 650 milliGRAM(s) Oral every 6 hours PRN Mild Pain (1 - 3)  dextrose 40% Gel 15 Gram(s) Oral once PRN Blood Glucose LESS THAN 70 milliGRAM(s)/deciliter  glucagon  Injectable 1 milliGRAM(s) IntraMuscular once PRN Glucose LESS THAN 70 milligrams/deciliter      FAMILY HISTORY:  No pertinent family history in first degree relatives      SOCIAL HISTORY: No EtOH, no tobacco    REVIEW OF SYSTEMS:    CONSTITUTIONAL: No weakness, fevers or chills  EYES/ENT: No visual changes;  No vertigo or throat pain   NECK: No pain or stiffness  RESPIRATORY: No cough, wheezing, hemoptysis; No shortness of breath  CARDIOVASCULAR: No chest pain or palpitations  GASTROINTESTINAL: No abdominal or epigastric pain. No nausea, vomiting, or hematemesis; No diarrhea or constipation. No melena or hematochezia.  GENITOURINARY: No dysuria, frequency or hematuria  NEUROLOGICAL: No numbness or weakness  SKIN: No itching, burning, rashes, or lesions   All other review of systems is negative unless indicated above.        T(F): 100.3 (02-06-19 @ 07:11), Max: 103.4 (02-05-19 @ 13:28)  HR: 102 (02-06-19 @ 08:47)  BP: 136/70 (02-06-19 @ 08:47)  RR: 16 (02-06-19 @ 08:47)  SpO2: 99% (02-06-19 @ 08:47)  Wt(kg): --    GENERAL: NAD, well-developed  HEAD:  Atraumatic, Normocephalic  EYES: EOMI, PERRLA, conjunctiva and sclera clear  NECK: Supple, No JVD  CHEST/LUNG: Clear to auscultation bilaterally; No wheeze  HEART: Regular rate and rhythm; No murmurs, rubs, or gallops  ABDOMEN: Soft, Nontender, Nondistended; Bowel sounds present  EXTREMITIES:  2+ Peripheral Pulses, No clubbing, cyanosis, or edema  NEUROLOGY: non-focal  SKIN: No rashes or lesions                          6.6    0.18  )-----------( 104      ( 06 Feb 2019 08:30 )             19.6       02-06    138  |  103  |  33<H>  ----------------------------<  160<H>  3.7   |  21<L>  |  1.96<H>    Ca    9.0      06 Feb 2019 08:30  Phos  4.0     02-06  Mg     2.4     02-06    TPro  6.5  /  Alb  3.3  /  TBili  0.7  /  DBili  x   /  AST  27  /  ALT  20  /  AlkPhos  52  02-06      Phosphorus Level, Serum: 4.0 mg/dL (02-06 @ 08:30)  Magnesium, Serum: 2.4 mg/dL (02-06 @ 08:30)  Magnesium, Serum: 1.4 mg/dL (02-05 @ 21:25)  Phosphorus Level, Serum: 3.9 mg/dL (02-05 @ 21:25)  Lactate Dehydrogenase, Serum: 167 U/L (02-05 @ 21:25)  Uric Acid, Serum: 9.4 mg/dL (02-05 @ 21:25)      PT/INR - ( 06 Feb 2019 08:30 )   PT: 20.8 SEC;   INR: 1.84          PTT - ( 06 Feb 2019 08:30 )  PTT:39.8 SEC

## 2019-02-06 NOTE — PROGRESS NOTE ADULT - PROBLEM SELECTOR PLAN 5
-syncope likely 2/2 orthostatic hypotension/hypovolemia in the setting of sepsis and diarrhea  -CT head neg  -EKG showed sinus tach  -BPs stable with SBP in 130s  -check, orthostatics  -check TTE  -tele monitor.

## 2019-02-06 NOTE — ED ADULT NURSE REASSESSMENT NOTE - NS ED NURSE REASSESS COMMENT FT1
Pt temp 103.5 and tachy in 140s, pt denies any cp, sob, or dizziness. MED resident Chavo paged x2, will continue to monitor.

## 2019-02-06 NOTE — ED ADULT NURSE REASSESSMENT NOTE - NS ED NURSE REASSESS COMMENT FT1
covering primary RN for break pt is in bed A and XO 3  with tachypnea noted, comunicates with simple sentences lung sounds present B/l  but diminished VS updated, House team 1  #[id number] made aware. pending bedside eval.

## 2019-02-06 NOTE — PROGRESS NOTE ADULT - PROBLEM SELECTOR PLAN 6
-ISABELLA on CKD likely prerenal   -Cr at admission 2.35, with a baseline of ~1.3  -Improving Cr (1.89 last night)  -F/u with UA, urine electrolytes bladder scan  -IV hydration, POCUS showed nondilated   -avoid nephrotoxic agents   -continue to monitor Cr. -ISABELLA on CKD likely ATN in the setting of sepsis  -Cr at admission 2.35, with a baseline of ~1.3  -Improving Cr (1.96)  -F/u with UA, urine electrolytes bladder scan  -IV hydration, POCUS showed nondilated   -avoid nephrotoxic agents   -continue to monitor Cr. -ISABELLA on CKD likely ATN in the setting of sepsis  -Cr at admission 2.35, with a baseline of ~1.3  -Improving Cr (1.96)  -F/u with UA, urine electrolytes bladder scan  -IV hydration  -avoid nephrotoxic agents   -continue to monitor Cr.

## 2019-02-06 NOTE — ED ADULT NURSE REASSESSMENT NOTE - NS ED NURSE REASSESS COMMENT FT1
Blood transfusion started at 11:00 am, VSS and in NAD.  Patient has no chills,, itching or rash present,  denies any symptoms of transfusion reaction.  Will continue to monitor patient closely.  Angeline LIGHT

## 2019-02-06 NOTE — PROGRESS NOTE ADULT - PROBLEM SELECTOR PLAN 4
-chemo induced pancytopenia   -Monitor CBC  -transfuse prn, keep Hgb above 7. -chemo induced pancytopenia   -Monitor CBC  -AM labs showed Hb of 6.6, transfused w/ 1 U pRBC  -transfuse prn, keep Hgb above 7. -chemo induced pancytopenia   -AM labs showed Hb of 6.6, transfused w/ 1 U PRBC  -monitor VBV, keep Hgb above 7  -neupogen as per Onc

## 2019-02-06 NOTE — ED ADULT NURSE REASSESSMENT NOTE - NS ED NURSE REASSESS COMMENT FT1
Patient tolerating Bipap, denies any SOB.  Respirations unlabored.  Will continue to monitor patient closely.  Angeline LIGHT

## 2019-02-06 NOTE — PROGRESS NOTE ADULT - PROBLEM SELECTOR PLAN 10
-DVT ppx- heparin 5000 U q8  -Diet- DASH/low carb. Elevated INR of unclear etiology, ?vitamin K deficiency   - check mixing study and DIC panel   - monitor INR

## 2019-02-06 NOTE — CHART NOTE - NSCHARTNOTEFT_GEN_A_CORE
Called for patient w/ neutropenic fever thought 2/2 PNA, now w/ persistent fever, T 101.4 -> 100.6. Other VS , /49. Currently on vanc (by level in stg of ISABELLA), cefepime, and azithromycin. Pt received tylenol at 2:44AM. Pt had repeat BMP demonstrating improving SCr, in stg of improvement will check vanc trough to determine if pt requires additional dose of vanc (last dose around 1PM on 2/5). Will give tylenol. Pt already cultured.     Devaughn Greene MD PGY-1  pgr: [phone]/[facility]: [id number]

## 2019-02-06 NOTE — ED ADULT NURSE REASSESSMENT NOTE - NS ED NURSE REASSESS COMMENT FT1
pt place don bi-pap settings 10, 5, 40% tolerating well,  blood work sent. pt is breathing evenly and unlabored.

## 2019-02-06 NOTE — ED ADULT NURSE REASSESSMENT NOTE - NS ED NURSE REASSESS COMMENT FT1
Received bedside report from RN Dari, Pt AOx3, currently on Bipap settings 10/5. pt denies any sob, cp, reports tolerating bipape well. Vitals as charted, call bell within reach, waiting on bed assignment, will continue to monitor.

## 2019-02-06 NOTE — PROGRESS NOTE ADULT - ASSESSMENT
73yo F with PMH of breast CA on chemo (last treatment 8 days ago), CKD, HTN, DMT2 presenting s/p syncopal episode, found to be in neutropenic sepsis (T 39.4-39.7 C, -124, RR 20, lactate 2.9, WBC 0.35, 0.01 K/ul) in setting of recent chemotherapy, likely 2/2 PNA, c/b ISABELLA on CKD, having recurrent fevers mild respiratory distress.

## 2019-02-06 NOTE — CONSULT NOTE ADULT - ATTENDING COMMENTS
I discussed the case, saw the patient with the fellow and agree with the above with the addendum- daily CBC with diff, transfuse 1-2 units PRBC, agree with neupogen.     Ely Oates MD

## 2019-02-07 DIAGNOSIS — J96.01 ACUTE RESPIRATORY FAILURE WITH HYPOXIA: ICD-10-CM

## 2019-02-07 LAB
ALBUMIN SERPL ELPH-MCNC: 3.3 G/DL — SIGNIFICANT CHANGE UP (ref 3.3–5)
ALP SERPL-CCNC: 62 U/L — SIGNIFICANT CHANGE UP (ref 40–120)
ALT FLD-CCNC: 29 U/L — SIGNIFICANT CHANGE UP (ref 4–33)
ANION GAP SERPL CALC-SCNC: 17 MMO/L — HIGH (ref 7–14)
ANISOCYTOSIS BLD QL: SLIGHT — SIGNIFICANT CHANGE UP
APTT BLD: 33.9 SEC — SIGNIFICANT CHANGE UP (ref 27.5–36.3)
AST SERPL-CCNC: 43 U/L — HIGH (ref 4–32)
BASE EXCESS BLDA CALC-SCNC: -6.6 MMOL/L — SIGNIFICANT CHANGE UP
BASE EXCESS BLDV CALC-SCNC: -3.1 MMOL/L — SIGNIFICANT CHANGE UP
BASOPHILS # BLD AUTO: 0.01 K/UL — SIGNIFICANT CHANGE UP (ref 0–0.2)
BASOPHILS NFR BLD AUTO: 1.6 % — SIGNIFICANT CHANGE UP (ref 0–2)
BASOPHILS NFR SPEC: 4.3 % — HIGH (ref 0–2)
BILIRUB SERPL-MCNC: 0.8 MG/DL — SIGNIFICANT CHANGE UP (ref 0.2–1.2)
BLASTS # FLD: 0 % — SIGNIFICANT CHANGE UP (ref 0–0)
BLOOD GAS VENOUS - CREATININE: 1.6 MG/DL — HIGH (ref 0.5–1.3)
BUN SERPL-MCNC: 30 MG/DL — HIGH (ref 7–23)
CALCIUM SERPL-MCNC: 9.3 MG/DL — SIGNIFICANT CHANGE UP (ref 8.4–10.5)
CHLORIDE BLDA-SCNC: 109 MMOL/L — HIGH (ref 96–108)
CHLORIDE BLDV-SCNC: 107 MMOL/L — SIGNIFICANT CHANGE UP (ref 96–108)
CHLORIDE SERPL-SCNC: 101 MMOL/L — SIGNIFICANT CHANGE UP (ref 98–107)
CO2 SERPL-SCNC: 20 MMOL/L — LOW (ref 22–31)
CREAT SERPL-MCNC: 1.65 MG/DL — HIGH (ref 0.5–1.3)
EOSINOPHIL # BLD AUTO: 0 K/UL — SIGNIFICANT CHANGE UP (ref 0–0.5)
EOSINOPHIL NFR BLD AUTO: 0 % — SIGNIFICANT CHANGE UP (ref 0–6)
EOSINOPHIL NFR FLD: 2.8 % — SIGNIFICANT CHANGE UP (ref 0–6)
GAS PNL BLDV: 137 MMOL/L — SIGNIFICANT CHANGE UP (ref 136–146)
GI PCR PANEL, STOOL: SIGNIFICANT CHANGE UP
GLUCOSE BLDA-MCNC: 162 MG/DL — HIGH (ref 70–99)
GLUCOSE BLDC GLUCOMTR-MCNC: 105 MG/DL — HIGH (ref 70–99)
GLUCOSE BLDC GLUCOMTR-MCNC: 137 MG/DL — HIGH (ref 70–99)
GLUCOSE BLDC GLUCOMTR-MCNC: 167 MG/DL — HIGH (ref 70–99)
GLUCOSE BLDC GLUCOMTR-MCNC: 175 MG/DL — HIGH (ref 70–99)
GLUCOSE BLDV-MCNC: 116 — HIGH (ref 70–99)
GLUCOSE SERPL-MCNC: 116 MG/DL — HIGH (ref 70–99)
HCO3 BLDA-SCNC: 19 MMOL/L — LOW (ref 22–26)
HCO3 BLDV-SCNC: 22 MMOL/L — SIGNIFICANT CHANGE UP (ref 20–27)
HCT VFR BLD CALC: 22.2 % — LOW (ref 34.5–45)
HCT VFR BLDA CALC: 22.7 % — LOW (ref 34.5–46.5)
HCT VFR BLDV CALC: 24.3 % — LOW (ref 34.5–45)
HGB BLD-MCNC: 7.6 G/DL — LOW (ref 11.5–15.5)
HGB BLDA-MCNC: 7.3 G/DL — LOW (ref 11.5–15.5)
HGB BLDV-MCNC: 7.8 G/DL — LOW (ref 11.5–15.5)
HYPOCHROMIA BLD QL: SLIGHT — SIGNIFICANT CHANGE UP
IMM GRANULOCYTES NFR BLD AUTO: 4.7 % — HIGH (ref 0–1.5)
INR BLD: 1.56 — HIGH (ref 0.88–1.17)
LACTATE BLDA-SCNC: 1.2 MMOL/L — SIGNIFICANT CHANGE UP (ref 0.5–2)
LACTATE BLDV-MCNC: 1 MMOL/L — SIGNIFICANT CHANGE UP (ref 0.5–2)
LYMPHOCYTES # BLD AUTO: 0.14 K/UL — LOW (ref 1–3.3)
LYMPHOCYTES # BLD AUTO: 21.9 % — SIGNIFICANT CHANGE UP (ref 13–44)
LYMPHOCYTES NFR SPEC AUTO: 22.9 % — SIGNIFICANT CHANGE UP (ref 13–44)
MAGNESIUM SERPL-MCNC: 2.3 MG/DL — SIGNIFICANT CHANGE UP (ref 1.6–2.6)
MCHC RBC-ENTMCNC: 28.4 PG — SIGNIFICANT CHANGE UP (ref 27–34)
MCHC RBC-ENTMCNC: 34.2 % — SIGNIFICANT CHANGE UP (ref 32–36)
MCV RBC AUTO: 82.8 FL — SIGNIFICANT CHANGE UP (ref 80–100)
METAMYELOCYTES # FLD: 2.8 % — HIGH (ref 0–1)
MICROCYTES BLD QL: SLIGHT — SIGNIFICANT CHANGE UP
MONOCYTES # BLD AUTO: 0.12 K/UL — SIGNIFICANT CHANGE UP (ref 0–0.9)
MONOCYTES NFR BLD AUTO: 18.8 % — HIGH (ref 2–14)
MONOCYTES NFR BLD: 20 % — HIGH (ref 2–9)
MYELOCYTES NFR BLD: 0 % — SIGNIFICANT CHANGE UP (ref 0–0)
NEUTROPHIL AB SER-ACNC: 22.9 % — LOW (ref 43–77)
NEUTROPHILS # BLD AUTO: 0.34 K/UL — LOW (ref 1.8–7.4)
NEUTROPHILS NFR BLD AUTO: 53 % — SIGNIFICANT CHANGE UP (ref 43–77)
NEUTS BAND # BLD: 12.9 % — HIGH (ref 0–6)
NRBC # BLD: 6 /100WBC — SIGNIFICANT CHANGE UP
NRBC # FLD: 0.02 K/UL — LOW (ref 25–125)
NRBC FLD-RTO: 3.1 — SIGNIFICANT CHANGE UP
OTHER - HEMATOLOGY %: 2.8 — SIGNIFICANT CHANGE UP
OVALOCYTES BLD QL SMEAR: SLIGHT — SIGNIFICANT CHANGE UP
PCO2 BLDA: 26 MMHG — LOW (ref 32–48)
PCO2 BLDV: 34 MMHG — LOW (ref 41–51)
PH BLDA: 7.43 PH — SIGNIFICANT CHANGE UP (ref 7.35–7.45)
PH BLDV: 7.41 PH — SIGNIFICANT CHANGE UP (ref 7.32–7.43)
PHOSPHATE SERPL-MCNC: 3.6 MG/DL — SIGNIFICANT CHANGE UP (ref 2.5–4.5)
PLATELET # BLD AUTO: 114 K/UL — LOW (ref 150–400)
PLATELET COUNT - ESTIMATE: SIGNIFICANT CHANGE UP
PMV BLD: 10.8 FL — SIGNIFICANT CHANGE UP (ref 7–13)
PO2 BLDA: 125 MMHG — HIGH (ref 83–108)
PO2 BLDV: 38 MMHG — SIGNIFICANT CHANGE UP (ref 35–40)
POIKILOCYTOSIS BLD QL AUTO: SLIGHT — SIGNIFICANT CHANGE UP
POTASSIUM BLDA-SCNC: 3 MMOL/L — LOW (ref 3.4–4.5)
POTASSIUM BLDV-SCNC: 3.1 MMOL/L — LOW (ref 3.4–4.5)
POTASSIUM SERPL-MCNC: 3.3 MMOL/L — LOW (ref 3.5–5.3)
POTASSIUM SERPL-SCNC: 3.3 MMOL/L — LOW (ref 3.5–5.3)
PROMYELOCYTES # FLD: 0 % — SIGNIFICANT CHANGE UP (ref 0–0)
PROT SERPL-MCNC: 6.8 G/DL — SIGNIFICANT CHANGE UP (ref 6–8.3)
PROTHROM AB SERPL-ACNC: 18 SEC — HIGH (ref 9.8–13.1)
RBC # BLD: 2.68 M/UL — LOW (ref 3.8–5.2)
RBC # FLD: 18 % — HIGH (ref 10.3–14.5)
SAO2 % BLDA: 98.8 % — SIGNIFICANT CHANGE UP (ref 95–99)
SAO2 % BLDV: 67.1 % — SIGNIFICANT CHANGE UP (ref 60–85)
SODIUM BLDA-SCNC: 135 MMOL/L — LOW (ref 136–146)
SODIUM SERPL-SCNC: 138 MMOL/L — SIGNIFICANT CHANGE UP (ref 135–145)
SPECIMEN SOURCE: SIGNIFICANT CHANGE UP
VANCOMYCIN FLD-MCNC: 10.1 UG/ML — SIGNIFICANT CHANGE UP
VARIANT LYMPHS # BLD: 8.6 % — SIGNIFICANT CHANGE UP
WBC # BLD: 0.64 K/UL — CRITICAL LOW (ref 3.8–10.5)
WBC # FLD AUTO: 0.64 K/UL — CRITICAL LOW (ref 3.8–10.5)

## 2019-02-07 PROCEDURE — 93010 ELECTROCARDIOGRAM REPORT: CPT

## 2019-02-07 PROCEDURE — 71275 CT ANGIOGRAPHY CHEST: CPT | Mod: 26

## 2019-02-07 PROCEDURE — 99233 SBSQ HOSP IP/OBS HIGH 50: CPT | Mod: GC

## 2019-02-07 PROCEDURE — 99291 CRITICAL CARE FIRST HOUR: CPT

## 2019-02-07 RX ORDER — FLUCONAZOLE 150 MG/1
800 TABLET ORAL ONCE
Qty: 0 | Refills: 0 | Status: DISCONTINUED | OUTPATIENT
Start: 2019-02-07 | End: 2019-02-07

## 2019-02-07 RX ORDER — ACETAMINOPHEN 500 MG
1000 TABLET ORAL ONCE
Qty: 0 | Refills: 0 | Status: COMPLETED | OUTPATIENT
Start: 2019-02-07 | End: 2019-02-07

## 2019-02-07 RX ORDER — POTASSIUM CHLORIDE 20 MEQ
10 PACKET (EA) ORAL
Qty: 0 | Refills: 0 | Status: COMPLETED | OUTPATIENT
Start: 2019-02-07 | End: 2019-02-07

## 2019-02-07 RX ORDER — PIPERACILLIN AND TAZOBACTAM 4; .5 G/20ML; G/20ML
3.38 INJECTION, POWDER, LYOPHILIZED, FOR SOLUTION INTRAVENOUS EVERY 8 HOURS
Qty: 0 | Refills: 0 | Status: DISCONTINUED | OUTPATIENT
Start: 2019-02-07 | End: 2019-02-10

## 2019-02-07 RX ORDER — SODIUM CHLORIDE 9 MG/ML
1000 INJECTION, SOLUTION INTRAVENOUS
Qty: 0 | Refills: 0 | Status: COMPLETED | OUTPATIENT
Start: 2019-02-07 | End: 2019-02-07

## 2019-02-07 RX ORDER — SODIUM CHLORIDE 9 MG/ML
2000 INJECTION, SOLUTION INTRAVENOUS
Qty: 0 | Refills: 0 | Status: DISCONTINUED | OUTPATIENT
Start: 2019-02-07 | End: 2019-02-07

## 2019-02-07 RX ORDER — FLUCONAZOLE 150 MG/1
400 TABLET ORAL ONCE
Qty: 0 | Refills: 0 | Status: COMPLETED | OUTPATIENT
Start: 2019-02-07 | End: 2019-02-07

## 2019-02-07 RX ORDER — FLUCONAZOLE 150 MG/1
200 TABLET ORAL EVERY 24 HOURS
Qty: 0 | Refills: 0 | Status: DISCONTINUED | OUTPATIENT
Start: 2019-02-07 | End: 2019-02-08

## 2019-02-07 RX ORDER — VANCOMYCIN HCL 1 G
VIAL (EA) INTRAVENOUS
Qty: 0 | Refills: 0 | Status: DISCONTINUED | OUTPATIENT
Start: 2019-02-07 | End: 2019-02-08

## 2019-02-07 RX ORDER — SODIUM CHLORIDE 9 MG/ML
1000 INJECTION INTRAMUSCULAR; INTRAVENOUS; SUBCUTANEOUS ONCE
Qty: 0 | Refills: 0 | Status: COMPLETED | OUTPATIENT
Start: 2019-02-07 | End: 2019-02-07

## 2019-02-07 RX ORDER — PIPERACILLIN AND TAZOBACTAM 4; .5 G/20ML; G/20ML
3.38 INJECTION, POWDER, LYOPHILIZED, FOR SOLUTION INTRAVENOUS ONCE
Qty: 0 | Refills: 0 | Status: COMPLETED | OUTPATIENT
Start: 2019-02-07 | End: 2019-02-07

## 2019-02-07 RX ORDER — VANCOMYCIN HCL 1 G
1250 VIAL (EA) INTRAVENOUS ONCE
Qty: 0 | Refills: 0 | Status: COMPLETED | OUTPATIENT
Start: 2019-02-07 | End: 2019-02-07

## 2019-02-07 RX ORDER — FLUCONAZOLE 150 MG/1
400 TABLET ORAL EVERY 24 HOURS
Qty: 0 | Refills: 0 | Status: DISCONTINUED | OUTPATIENT
Start: 2019-02-07 | End: 2019-02-07

## 2019-02-07 RX ORDER — VANCOMYCIN HCL 1 G
1250 VIAL (EA) INTRAVENOUS EVERY 24 HOURS
Qty: 0 | Refills: 0 | Status: DISCONTINUED | OUTPATIENT
Start: 2019-02-08 | End: 2019-02-08

## 2019-02-07 RX ADMIN — PIPERACILLIN AND TAZOBACTAM 200 GRAM(S): 4; .5 INJECTION, POWDER, LYOPHILIZED, FOR SOLUTION INTRAVENOUS at 14:50

## 2019-02-07 RX ADMIN — HEPARIN SODIUM 5000 UNIT(S): 5000 INJECTION INTRAVENOUS; SUBCUTANEOUS at 01:48

## 2019-02-07 RX ADMIN — Medication 100 MILLIEQUIVALENT(S): at 09:46

## 2019-02-07 RX ADMIN — AZITHROMYCIN 250 MILLIGRAM(S): 500 TABLET, FILM COATED ORAL at 19:17

## 2019-02-07 RX ADMIN — Medication 400 MILLIGRAM(S): at 16:48

## 2019-02-07 RX ADMIN — FLUCONAZOLE 100 MILLIGRAM(S): 150 TABLET ORAL at 17:50

## 2019-02-07 RX ADMIN — Medication 1000 MILLIGRAM(S): at 01:43

## 2019-02-07 RX ADMIN — SODIUM CHLORIDE 2000 MILLILITER(S): 9 INJECTION INTRAMUSCULAR; INTRAVENOUS; SUBCUTANEOUS at 19:03

## 2019-02-07 RX ADMIN — HEPARIN SODIUM 5000 UNIT(S): 5000 INJECTION INTRAVENOUS; SUBCUTANEOUS at 22:22

## 2019-02-07 RX ADMIN — Medication 100 MILLIEQUIVALENT(S): at 13:46

## 2019-02-07 RX ADMIN — SODIUM CHLORIDE 100 MILLILITER(S): 9 INJECTION, SOLUTION INTRAVENOUS at 01:42

## 2019-02-07 RX ADMIN — Medication 100 MILLIEQUIVALENT(S): at 13:40

## 2019-02-07 RX ADMIN — Medication 480 MICROGRAM(S): at 16:00

## 2019-02-07 RX ADMIN — FLUCONAZOLE 100 MILLIGRAM(S): 150 TABLET ORAL at 22:15

## 2019-02-07 RX ADMIN — Medication 400 MILLIGRAM(S): at 05:05

## 2019-02-07 RX ADMIN — HEPARIN SODIUM 5000 UNIT(S): 5000 INJECTION INTRAVENOUS; SUBCUTANEOUS at 15:35

## 2019-02-07 RX ADMIN — PIPERACILLIN AND TAZOBACTAM 25 GRAM(S): 4; .5 INJECTION, POWDER, LYOPHILIZED, FOR SOLUTION INTRAVENOUS at 22:23

## 2019-02-07 RX ADMIN — HEPARIN SODIUM 5000 UNIT(S): 5000 INJECTION INTRAVENOUS; SUBCUTANEOUS at 09:45

## 2019-02-07 RX ADMIN — SODIUM CHLORIDE 2000 MILLILITER(S): 9 INJECTION, SOLUTION INTRAVENOUS at 19:03

## 2019-02-07 RX ADMIN — Medication 1000 MILLIGRAM(S): at 05:26

## 2019-02-07 RX ADMIN — PIPERACILLIN AND TAZOBACTAM 25 GRAM(S): 4; .5 INJECTION, POWDER, LYOPHILIZED, FOR SOLUTION INTRAVENOUS at 22:24

## 2019-02-07 RX ADMIN — Medication 166.67 MILLIGRAM(S): at 11:30

## 2019-02-07 RX ADMIN — Medication 1000 MILLIGRAM(S): at 17:30

## 2019-02-07 NOTE — PROGRESS NOTE ADULT - PROBLEM SELECTOR PLAN 9
Hx of HTN, on home amlodipine-benazepril 10 mg-40 mg   -BP stable (SBP 130s)  -hold home BP meds in setting of sepsis/volume depletion/syncope  -monitor BP. -Hx of malignant neoplasm of right female breast and intraductal carcinoma in situ of left breast) on chemo (last treatment 9 days ago, final treatment of cyclophosphamide and docetaxel)  -Spoke with Dr. Ruiz ([phone]), does not come to [facility], although suggests starting Neupogen  -Will get in contact with [facility]

## 2019-02-07 NOTE — PROGRESS NOTE ADULT - ATTENDING COMMENTS
sad case, resp failure, infiltrates hypoxemia after chemo for breast cancer, likely need intubation and bronchoscopy, now on NIV, very guarded

## 2019-02-07 NOTE — CHART NOTE - NSCHARTNOTEFT_GEN_A_CORE
HPI:  Pt is a 73yo F with PMH of breast CA (dx with malignant neoplasm of right female breast and intraductal carcinoma in situ of left breast) on chemo (last treatment 8 days ago), CKD, HTN, DMT2 (not on home insulin) presenting s/p syncopal episode. Pt was found down and disorientated this AM by her daughter covered in feces, requiring assistance to stand up. Daughter denied abnormal movements, unknown if urinary incontinence, denied tongue bitting. The pt does not remember the events surrounding the fall, although believes she was probably getting out of bed based on location. When EMS arrived, she was hypoxic with SpO2 of 82% and A+Ox2. She has had 4 episodes of watery diarrhea, decreased appetite, and chills starting yesterday. She has also had a lingering dry cough for the past couple of days. Denied fevers, dizziness, blurry vision, congestion, SOB, chest pain, abdominal pain, n/v/c, muscle weakness, pain.     In the ED: Pt was febrile (39.4-39.7 C), tachycardic (111-124), RR of 20, and normotensive (SBP 140s). Labs showed severe leukopenia (WBC 0.36), with neutropenia (0.01 K/ul), anemia, ISABELLA, high lactate (2.9). She was given 2L NS, cefepime vanco, APAP and ibuprofen. (05 Feb 2019 15:44)      RRT called for tachycardia and tachypnea on BiPAP. The patient had a RR in the 40s on arrival. During the RRT the patient was evaluated by the MICU at the bedside with US. The patient was accepted to the MICU for respiratory distress and elective intubation.       Norberto Martinez MD PGY3  [facility] Pager #: [phone]  [facility] Pager #: [phone]

## 2019-02-07 NOTE — PROGRESS NOTE ADULT - ASSESSMENT
75yo F with PMH of breast CA (dx with malignant neoplasm of right female breast and intraductal carcinoma in situ of left breast) on chemo (last treatment 8 days ago), CKD, HTN, DMT2 admitted for neutropenic fever with sepsis 2/2 to RUL PNA with course c/b acute hypoxemic respiratory failure.     #Neuro  -alert, responsive; continue to monitor mental status closely    #Respiratory     #CV  -sinus tachycardia likely 2/2 to sepsis & fevers  -antipyretics; maintenance IVF    #GI  -NPO while on BiPap    #ID  -c/w broad spectrum antibiotics   -f/u sputum Cx & legionella   -BCx negative     #Heme/Onc  -c/w Xario for neutropenia     #Renal    #Endocrine    #DVT ppx:     Leo Fontenot D.O.  PGY-2 EM/IM  Pager #[phone] 75yo F with PMH of breast CA (dx with malignant neoplasm of right female breast and intraductal carcinoma in situ of left breast) on chemo (last treatment 8 days ago), CKD, HTN, DMT2 admitted for neutropenic fever with sepsis 2/2 to RUL PNA with course c/b acute hypoxemic respiratory failure.     #Neuro  -alert, responsive; continue to monitor mental status closely    #Respiratory   -ABG acceptable on BiPap; patient with improved saturation & work of breathing  -will consider elective intubation for Bronchoscopy  -Broad spectrum abx; lower suspicion for PCP     #CV  -sinus tachycardia likely 2/2 to sepsis & fevers  -antipyretics; maintenance IVF    #GI  -NPO while on BiPap    #ID  -c/w broad spectrum antibiotics   -f/u sputum Cx & legionella   -BCx negative     #Heme/Onc  -c/w Xario for neutropenia     #Renal  -ISABELLA on CKD; improving with IVF    #Endocrine  -c/w ISS    #DVT ppx:   -HSQ     Leo Fontenot D.O.  PGY-2 EM/IM  Pager #[phone]

## 2019-02-07 NOTE — ED ADULT NURSE REASSESSMENT NOTE - NS ED NURSE REASSESS COMMENT FT1
Pt febrile 102.5 orally. MD on HS team 1 made aware. Awaiting IV tylenol order. Pt AxOx3. Pt sinus tachycardia on the monitor. Pt denies shortness of breath. Will continue to monitor. Pt febrile 102.5 orally. MD on HS team 1 made aware. Awaiting IV tylenol order. Pt AxOx3. Pt sinus tachycardia on the monitor. Pt denies shortness of breath. Pt tolerating Bipap at this time. Cooling blanket to be applied to patient. Will continue to monitor.

## 2019-02-07 NOTE — CHART NOTE - NSCHARTNOTEFT_GEN_A_CORE
Called for patient w/ fever, T 103.5 and tachy in 140s. Pt seen and evaluated at bedside, admitted for neutropenic fever thought 2/2 PNA. Pt on vanc/cefepime/azithro. BCx drawn yesterday evening. Will give IV tylenol, pt currently on BIPAP and has no complaints other than fever. Will continue to monitor, recheck vitals in 1 hour after administration of IV tylenol.     Devaughn Greene MD PGY-1  pgr: [phone]/MELVIN: [phone] Called for patient w/ fever, T 103.5 and tachy in 140s. Pt seen and evaluated at bedside, admitted for neutropenic fever thought 2/2 PNA. Pt on vanc/cefepime/azithro. BCx drawn yesterday evening. Will give IV tylenol, pt currently on BIPAP and has no complaints other than fever. Will continue to monitor, recheck vitals in 1 hour after administration of IV tylenol 1g x 1.     Amendment: Pt fever resolved after administration of IV tylenol, however, at 4:00AM pt had another fever, would opt for PO tylenol, though since pt on BIPAP, will give IV tylenol 1g x 1 for now    Devaughn Greene MD PGY-1  pgr: [phone]/MELVIN: [phone] Called for patient w/ fever, T 103.5 and tachy in 140s. Pt seen and evaluated at bedside, admitted for neutropenic fever thought 2/2 PNA. Pt on vanc/cefepime/azithro. BCx drawn yesterday evening. Will give IV tylenol, pt currently on BIPAP and has no complaints other than fever. Will continue to monitor, recheck vitals in 1 hour after administration of IV tylenol 1g x 1.     Amendment: Pt fever resolved after administration of IV tylenol, however, at 4:00AM pt had another fever, would opt for PO tylenol, though since pt on BIPAP, will give IV tylenol 1g x 1 for now. VSS    Devaughn Greene MD PGY-1  pgr: [phone]/MELVIN: [phone]

## 2019-02-07 NOTE — PROGRESS NOTE ADULT - SUBJECTIVE AND OBJECTIVE BOX
MICU Accept Note     CHIEF COMPLAINT: Patient is a 74y old  Female who presents with a chief complaint of Syncopal Episode (2019 07:24)    Interval Events:    Pt is a 73yo F with PMH of breast CA (dx with malignant neoplasm of right female breast and intraductal carcinoma in situ of left breast) on chemo (last treatment 8 days ago), CKD, HTN, DMT2 (not on home insulin) presenting s/p syncopal episode. Pt was found down and disorientated this AM by her daughter covered in feces, requiring assistance to stand up. Daughter denied abnormal movements, unknown if urinary incontinence, denied tongue bitting. The pt does not remember the events surrounding the fall, although believes she was probably getting out of bed based on location. When EMS arrived, she was hypoxic with SpO2 of 82% and A+Ox2. She has had 4 episodes of watery diarrhea, decreased appetite, and chills starting yesterday. She has also had a lingering dry cough for the past couple of days. Denied fevers, dizziness, blurry vision, congestion, SOB, chest pain, abdominal pain, n/v/c, muscle weakness, pain.     In the ED: Pt was febrile (39.4-39.7 C), tachycardic (111-124), RR of 20, and normotensive (SBP 140s). Labs showed severe leukopenia (WBC 0.36), with neutropenia (0.01 K/ul), anemia, ISABELLA, high lactate (2.9). She was given 2L NS, cefepime vanco, APAP and ibuprofen.    RRT called on  for tachypnea and respiratory distress. Patient started on BiPap with improvement in tachypnea and work of breathing. Febrile to 101 axillary.    OBJECTIVE:  ICU Vital Signs Last 24 Hrs  T(C): 39.4 (2019 17:30), Max: 39.7 (2019 22:03)  T(F): 103 (2019 17:30), Max: 103.5 (2019 22:03)  HR: 115 (2019 17:37) (50 - 140)  BP: 114/48 (2019 17:37) (114/48 - 169/85)  BP(mean): 66 (2019 17:37) (66 - 66)  ABP: --  ABP(mean): --  RR: 30 (2019 17:37) (18 - 45)  SpO2: 100% (2019 17:37) (95% - 100%)        CAPILLARY BLOOD GLUCOSE      POCT Blood Glucose.: 105 mg/dL (2019 08:49)      PHYSICAL EXAM:  General: tachypneic on BiPap; alert.   Neck: supple  Respiratory: coarse breath sounds on right   Cardiovascular: S1S2 Tachycardic  Abdomen: soft, NTND  Extremities: no peripheral edema.   Skin: no rashes or lesions.  Neurological: alert & responsive.     HOSPITAL MEDICATIONS:  MEDICATIONS  (STANDING):  azithromycin  IVPB      azithromycin  IVPB 500 milliGRAM(s) IV Intermittent every 24 hours  dextrose 5%. 1000 milliLiter(s) (50 mL/Hr) IV Continuous <Continuous>  dextrose 50% Injectable 12.5 Gram(s) IV Push once  dextrose 50% Injectable 25 Gram(s) IV Push once  dextrose 50% Injectable 25 Gram(s) IV Push once  filgrastim-sndz Injectable 480 MICROGram(s) SubCutaneous daily  fluconAZOLE IVPB 400 milliGRAM(s) IV Intermittent once  fluconAZOLE IVPB 200 milliGRAM(s) IV Intermittent every 24 hours  heparin  Injectable 5000 Unit(s) SubCutaneous every 8 hours  insulin lispro (HumaLOG) corrective regimen sliding scale   SubCutaneous three times a day before meals  insulin lispro (HumaLOG) corrective regimen sliding scale   SubCutaneous at bedtime  lactated ringers. 1000 milliLiter(s) (2000 mL/Hr) IV Continuous <Continuous>  piperacillin/tazobactam IVPB. 3.375 Gram(s) IV Intermittent every 8 hours  sodium chloride 0.9% Bolus 1000 milliLiter(s) IV Bolus once  vancomycin  IVPB        MEDICATIONS  (PRN):  acetaminophen   Tablet .. 650 milliGRAM(s) Oral every 6 hours PRN Mild Pain (1 - 3)  dextrose 40% Gel 15 Gram(s) Oral once PRN Blood Glucose LESS THAN 70 milliGRAM(s)/deciliter  glucagon  Injectable 1 milliGRAM(s) IntraMuscular once PRN Glucose LESS THAN 70 milligrams/deciliter      LABS:  ( @ 05:03)                        7.6  0.64 )-----------( 114                 22.2    Neutrophils = 0.34 (53.0%)  Lymphocytes = 0.14 (21.9%)  Eosinophils = 0.00 (0.0%)  Basophils = 0.01 (1.6%)  Monocytes = 0.12 (18.8%)  Bands = 12.9%    WBC Trend: 0.64<--, 0.26<--, 0.18<--  Hb Trend: 7.6<--, 8.2<--, 6.6<--, 7.3<--  Plt Trend: 114<--, 106<--, 104<--, 109<--      138  |  101  |  30<H>  ----------------------------<  116<H>  3.3<L>   |  20<L>  |  1.65<H>    Ca    9.3      2019 05:03  Phos  3.6       Mg     2.3         TPro  6.8  /  Alb  3.3  /  TBili  0.8  /  DBili  x   /  AST  43<H>  /  ALT  29  /  AlkPhos  62      Creatinine Trend: 1.65<--, 1.73<--, 1.96<--, 1.89<--, 2.35<--  PT/INR - ( 2019 05:03 )   PT: 18.0 SEC;   INR: 1.56          PTT - ( 2019 05:03 )  PTT:33.9 SEC  Urinalysis Basic - ( 2019 14:50 )    Color: YELLOW / Appearance: TURBID / S.017 / pH: 6.0  Gluc: NEGATIVE / Ketone: NEGATIVE  / Bili: NEGATIVE / Urobili: NORMAL   Blood: SMALL / Protein: 100 / Nitrite: NEGATIVE   Leuk Esterase: NEGATIVE / RBC: 1-3 / WBC 2-5   Sq Epi: x / Non Sq Epi: SMALL / Bacteria: SMALL      Arterial Blood Gas:   @ 16:15  7.43/26/125/19/98.8/-6.6  ABG lactate: 1.2  Arterial Blood Gas:   @ 15:25  7.44/29/69/21/94.1/-3.9  ABG lactate: 1.4    Venous Blood Gas:   @ 05:03  7.41/34/38/22/67.1  VBG Lactate: 1.0    CARDIAC MARKERS ( 2019 16:06 )  Trop x     /  u/L<H> / CKMB 1.20 ng/mL         MICROBIOLOGY:   Blood Cx: NGTD  Legionella: Negative   RVP: Negative     RADIOLOGY:    CT:    IMPRESSION:     Limited visualization of the segmental and subsegmental pulmonary   arterial branches secondary to respiratory motion artifact. No main,   right or left, or lobar pulmonary embolism.    Consolidation involving the right upper lobe with air bronchograms   compatible with pneumonia. Followup to ensure resolution.     Trace right pleural effusion. MICU Accept Note     CHIEF COMPLAINT: Patient is a 74y old  Female who presents with a chief complaint of Syncopal Episode (2019 07:24)    Interval Events:    Pt is a 73yo F with PMH of breast CA (dx with malignant neoplasm of right female breast and intraductal carcinoma in situ of left breast) on chemo (last treatment 8 days ago), CKD, HTN, DMT2 (not on home insulin) presenting s/p syncopal episode. Pt was found down and disorientated this AM by her daughter covered in feces, requiring assistance to stand up. Daughter denied abnormal movements, unknown if urinary incontinence, denied tongue bitting. The pt does not remember the events surrounding the fall, although believes she was probably getting out of bed based on location. When EMS arrived, she was hypoxic with SpO2 of 82% and A+Ox2. She has had 4 episodes of watery diarrhea, decreased appetite, and chills starting yesterday. She has also had a lingering dry cough for the past couple of days. Denied fevers, dizziness, blurry vision, congestion, SOB, chest pain, abdominal pain, n/v/c, muscle weakness, pain.     In the ED: Pt was febrile (39.4-39.7 C), tachycardic (111-124), RR of 20, and normotensive (SBP 140s). Labs showed severe leukopenia (WBC 0.36), with neutropenia (0.01 K/ul), anemia, ISABELLA, high lactate (2.9). She was given 2L NS, cefepime vanco, APAP and ibuprofen.    RRT called on  for tachypnea and respiratory distress with desaturation to 82%. Patient started on BiPap with improvement in tachypnea and work of breathing. Febrile to 101 axillary.    OBJECTIVE:  ICU Vital Signs Last 24 Hrs  T(C): 39.4 (2019 17:30), Max: 39.7 (2019 22:03)  T(F): 103 (2019 17:30), Max: 103.5 (2019 22:03)  HR: 115 (2019 17:37) (50 - 140)  BP: 114/48 (2019 17:37) (114/48 - 169/85)  BP(mean): 66 (2019 17:37) (66 - 66)  ABP: --  ABP(mean): --  RR: 30 (2019 17:37) (18 - 45)  SpO2: 100% (2019 17:37) (95% - 100%)        CAPILLARY BLOOD GLUCOSE      POCT Blood Glucose.: 105 mg/dL (2019 08:49)      PHYSICAL EXAM:  General: tachypneic on BiPap; alert.   Neck: supple  Respiratory: coarse breath sounds on right   Cardiovascular: S1S2 Tachycardic  Abdomen: soft, NTND  Extremities: no peripheral edema.   Skin: no rashes or lesions.  Neurological: alert & responsive.     HOSPITAL MEDICATIONS:  MEDICATIONS  (STANDING):  azithromycin  IVPB      azithromycin  IVPB 500 milliGRAM(s) IV Intermittent every 24 hours  dextrose 5%. 1000 milliLiter(s) (50 mL/Hr) IV Continuous <Continuous>  dextrose 50% Injectable 12.5 Gram(s) IV Push once  dextrose 50% Injectable 25 Gram(s) IV Push once  dextrose 50% Injectable 25 Gram(s) IV Push once  filgrastim-sndz Injectable 480 MICROGram(s) SubCutaneous daily  fluconAZOLE IVPB 400 milliGRAM(s) IV Intermittent once  fluconAZOLE IVPB 200 milliGRAM(s) IV Intermittent every 24 hours  heparin  Injectable 5000 Unit(s) SubCutaneous every 8 hours  insulin lispro (HumaLOG) corrective regimen sliding scale   SubCutaneous three times a day before meals  insulin lispro (HumaLOG) corrective regimen sliding scale   SubCutaneous at bedtime  lactated ringers. 1000 milliLiter(s) (2000 mL/Hr) IV Continuous <Continuous>  piperacillin/tazobactam IVPB. 3.375 Gram(s) IV Intermittent every 8 hours  sodium chloride 0.9% Bolus 1000 milliLiter(s) IV Bolus once  vancomycin  IVPB        MEDICATIONS  (PRN):  acetaminophen   Tablet .. 650 milliGRAM(s) Oral every 6 hours PRN Mild Pain (1 - 3)  dextrose 40% Gel 15 Gram(s) Oral once PRN Blood Glucose LESS THAN 70 milliGRAM(s)/deciliter  glucagon  Injectable 1 milliGRAM(s) IntraMuscular once PRN Glucose LESS THAN 70 milligrams/deciliter      LABS:  ( @ 05:03)                        7.6  0.64 )-----------( 114                 22.2    Neutrophils = 0.34 (53.0%)  Lymphocytes = 0.14 (21.9%)  Eosinophils = 0.00 (0.0%)  Basophils = 0.01 (1.6%)  Monocytes = 0.12 (18.8%)  Bands = 12.9%    WBC Trend: 0.64<--, 0.26<--, 0.18<--  Hb Trend: 7.6<--, 8.2<--, 6.6<--, 7.3<--  Plt Trend: 114<--, 106<--, 104<--, 109<--      138  |  101  |  30<H>  ----------------------------<  116<H>  3.3<L>   |  20<L>  |  1.65<H>    Ca    9.3      2019 05:03  Phos  3.6       Mg     2.3         TPro  6.8  /  Alb  3.3  /  TBili  0.8  /  DBili  x   /  AST  43<H>  /  ALT  29  /  AlkPhos  62      Creatinine Trend: 1.65<--, 1.73<--, 1.96<--, 1.89<--, 2.35<--  PT/INR - ( 2019 05:03 )   PT: 18.0 SEC;   INR: 1.56          PTT - ( 2019 05:03 )  PTT:33.9 SEC  Urinalysis Basic - ( 2019 14:50 )    Color: YELLOW / Appearance: TURBID / S.017 / pH: 6.0  Gluc: NEGATIVE / Ketone: NEGATIVE  / Bili: NEGATIVE / Urobili: NORMAL   Blood: SMALL / Protein: 100 / Nitrite: NEGATIVE   Leuk Esterase: NEGATIVE / RBC: 1-3 / WBC 2-5   Sq Epi: x / Non Sq Epi: SMALL / Bacteria: SMALL      Arterial Blood Gas:   @ 16:15  7.43/26/125/19/98.8/-6.6  ABG lactate: 1.2  Arterial Blood Gas:   @ 15:25  7.44/29/69/21/94.1/-3.9  ABG lactate: 1.4    Venous Blood Gas:   @ 05:03  7.41/34/38/22/67.1  VBG Lactate: 1.0    CARDIAC MARKERS ( 2019 16:06 )  Trop x     /  u/L<H> / CKMB 1.20 ng/mL         MICROBIOLOGY:   Blood Cx: NGTD  Legionella: Negative   RVP: Negative     RADIOLOGY:    CT:    IMPRESSION:     Limited visualization of the segmental and subsegmental pulmonary   arterial branches secondary to respiratory motion artifact. No main,   right or left, or lobar pulmonary embolism.    Consolidation involving the right upper lobe with air bronchograms   compatible with pneumonia. Followup to ensure resolution.     Trace right pleural effusion.

## 2019-02-07 NOTE — PROGRESS NOTE ADULT - PROBLEM SELECTOR PLAN 2
-RLL opacity on CXR   -POCUS- intermittent B lines at Rt LL, no consolidation   -continue IV Vanco and Cefepime for possible MRSA or gram negative PNA  -added Zithromax for atypical coverage   -f/up blood cultures, sputum culture if cough productive   -check urine legionella, can d/c Zithromax if negative  -on BIPAP (IPAP 10, EPAP 5, FiO2 40%)

## 2019-02-07 NOTE — PROGRESS NOTE ADULT - ASSESSMENT
73yo F with PMH of breast CA on chemo (last treatment 8 days ago), CKD, HTN, DMT2 presenting s/p syncopal episode, found to be in neutropenic sepsis (T 39.4-39.7 C, -124, RR 20, lactate 2.9, WBC 0.35, 0.01 K/ul) in setting of recent chemotherapy, likely 2/2 PNA, c/b ISABELLA on CKD, having recurrent fevers respiratory distress on BiPAP.

## 2019-02-07 NOTE — ED ADULT NURSE REASSESSMENT NOTE - NS ED NURSE REASSESS COMMENT FT1
Pt currently on bipape, appears to be tolerating, denies any sob or cp. Pt AM labs sent as ordered, medicated as ordered, will continue to monitor.

## 2019-02-07 NOTE — PROGRESS NOTE ADULT - ATTENDING COMMENTS
Patient was physically seen and examined by me. I agree with above plan and findings.  Sepsis likely d/t HCAP, continue IV Vanco/Zosyn/Zithromax, f/up cultures and urine legionella, may need to start empiric fungal coverage if remains febrile   Diarrhea, stool studies so far negative   Chemo induced pancytopenia, transfused 1 unit PRBC, may start neupogen as per Onc, monitor CBC  ISABELLA likely d/t ATN, IVF, monitor Cr  Syncope likely d/t hypovolemia, IVF, check TTE  Hypokalemia, supplement K

## 2019-02-07 NOTE — PROGRESS NOTE ADULT - PROBLEM SELECTOR PLAN 8
-Hx of malignant neoplasm of right female breast and intraductal carcinoma in situ of left breast) on chemo (last treatment 9 days ago, final treatment of cyclophosphamide and docetaxel)  -Spoke with Dr. Ruiz ([phone]), does not come to LI, although suggests starting Neupogen  -Will get in contact with [facility] -HbA1C 7.6  -On home glimepride 1 mg, will hold   -serum glucose 170  -start low dose insulin sliding scale with FS premeal and qhs

## 2019-02-07 NOTE — PROGRESS NOTE ADULT - PROBLEM SELECTOR PLAN 3
-check stool studies and Cdiff PCR   -monitor bowel movements.   -Continuous watery diarrhea -likely due to PNA  -no PE on CTA chest   -continue antibiotics as above   -BIPAP for increased work of breathing

## 2019-02-07 NOTE — ED ADULT NURSE REASSESSMENT NOTE - NS ED NURSE REASSESS COMMENT FT1
Report received from night RN. Pt AxOx3. Pt denies lightheadedness, dizziness, chest pain and shortness of breath at this time. Pt sinus tachycardia on the monitor. Pt tolerating BIPAP at this time. Breathing even and unlabored. HS team 1 called. Team made aware pt potassium 3.3 and temp of 99.8. Do not give tylenol as per MD. Awaiting orders for potassium. Will continue to monitor. Report received from night RN. Pt AxOx3. Pt denies lightheadedness, dizziness, chest pain and shortness of breath at this time. Pt sinus tachycardia on the monitor. Pt tolerating BIPAP at this time. Breathing even and unlabored. HS team 1 called. Team made aware pt potassium 3.3 and temp of 99.8. Do not give tylenol as per MD. Awaiting orders for potassium. R chest wall port clear and patent. Will continue to monitor.

## 2019-02-07 NOTE — PROGRESS NOTE ADULT - PROBLEM SELECTOR PLAN 4
-chemo induced pancytopenia   -AM labs showed Hb of 7.6, transfused w/ 1 U PRBC yesterday  -neupogen as per Onc -check stool studies and Cdiff PCR   -monitor bowel movements.   -Continuous watery diarrhea

## 2019-02-07 NOTE — ED ADULT NURSE REASSESSMENT NOTE - NS ED NURSE REASSESS COMMENT FT1
Pt AOX3, denies any sob or cp. Pt vitals as charted, appears comfortable, MD marin RN paged with reassessed vitals as requested, will continue to monitor.

## 2019-02-07 NOTE — PROGRESS NOTE ADULT - PROBLEM SELECTOR PLAN 7
-HbA1C 7.6  -On home glimepride 1 mg, will hold   -serum glucose 170  -start low dose insulin sliding scale with FS premeal and qhs -ISABELLA on CKD likely ATN in the setting of sepsis  -Cr at admission 2.35, with a baseline of ~1.3  -Improving Cr (1.65)  -UA shoed FENa of 0.4%, likely indicating prerenal ISABELLA on the setting of CKD  -avoid nephrotoxic agents, getting IV contrast today so monitor closesly and continue IV fluids  -continue to monitor Cr.

## 2019-02-07 NOTE — PROGRESS NOTE ADULT - PROBLEM SELECTOR PLAN 1
-Met sepsis criteria in the ED (T 39.4-39.7 C, -124, RR 20, lactate 2.9), with severe leukopenia (WBC 0.35) and neutropenia (0.01 K/ul), likely secondary to HCAP  -Continues to be febrile, WBC slight increase to 0.64  -Source possibly PNA in the setting of cough and opacity on CXR vs GI in the setting of diarrhea  -Continue IV Vanco, Cefepime, and Zithromax. Monitor Vanco level.   -vanco trough 10.1, cont. weight based vanco dosing (1000mg q24hr).  -F/u blood/urine cultures- currently negative  -check stool studies   -monitor VS q4h. -Met sepsis criteria in the ED (T 39.4-39.7 C, -124, RR 20, lactate 2.9), with severe leukopenia (WBC 0.35) and neutropenia (0.01 K/ul), likely secondary to HCAP  -Continues to be febrile, WBC slight increase to 0.64  -Source possibly PNA in the setting of cough and opacity on CXR vs GI in the setting of diarrhea  -Continue IV Vanco, Cefepime, and Zithromax. Monitor Vanco level.   -vanco trough 10.1, cont. weight based vanco dosing (1000mg q24hr).  -F/u blood/urine cultures- currently negative  -f/u stool studies   -continued respiratory distress, requiring BiPAP, getting CTA due to concern for PE  -monitor VS q4h. -Met sepsis criteria in the ED (T 39.4-39.7 C, -124, RR 20, lactate 2.9), with severe leukopenia (WBC 0.35) and neutropenia (0.01 K/ul), likely secondary to HCAP  -Continues to be febrile, WBC slight increase to 0.64  -Source possibly PNA in the setting of cough and opacity on CXR vs GI in the setting of diarrhea  -Continue IV Vanco, Cefepime, and Zithromax. Monitor Vanco level.   -vanco trough 10.1, cont. weight based vanco dosing (1000mg q24hr).  -F/u blood/urine cultures- currently negative  -f/u stool studies   -continued respiratory distress, yesterdays ABG indicated hypoxic respiratory failure, requiring BiPAP, getting CTA due to concern for PE  -monitor VS q4h.

## 2019-02-07 NOTE — PROGRESS NOTE ADULT - SUBJECTIVE AND OBJECTIVE BOX
SUBJECTIVE: Pt on BIPAP (IPAP 10, EPAP 5, FiO2 40%) starting yesterday afternoon due to tachypnea in the mid-30s. The Pt had persistently febrile, tachycardia, and tachypneic overnight (@22:03 103.5 F, got 1g of APAP -> @0:00 102 F -> @ 1:21 98.8 F-> @4:31 103F, got 1 g of APAP)   overnight, Other VS also concerning for tachycardia (max 139 at 22:03 ) and tachypnea (max 30 on exam at 7:00). SpO2 was 100%%. Currently on vanc (weight based daily), cefepime, and azithromycin. Pt still notes several episodes of diarrhea overnight. Urinating well. She still notes she has a decreased appetite with little oral intake over the past 2 day. Denies pain, n/v, dizziness, chest pain, palpitations.     ROS:  	General: +fever, +chills  	HEENT: denies nasal congestion, sore throat, rhinorrhea  	Eyes: denies vision changes, blurry vision  	CV: denies chest pain, palpitations  	Resp: +SOB,  	Abdominal: denies nausea, vomiting, +diarrhea, denies abdominal pain, blood in stool, dark stool  	: denies pain with urination/changes in urination  	MSK: denies recent trauma or muscle weakness  	Neuro: denies headaches, numbness, tingling, dizziness, lightheadedness.  	Skin: denies new rashes, lesions, bruises  	Endocrine: denies recent weight loss    MEDICATIONS  (STANDING):  azithromycin  IVPB      azithromycin  IVPB 500 milliGRAM(s) IV Intermittent every 24 hours  cefepime   IVPB 2000 milliGRAM(s) IV Intermittent daily  dextrose 5%. 1000 milliLiter(s) (50 mL/Hr) IV Continuous <Continuous>  dextrose 50% Injectable 12.5 Gram(s) IV Push once  dextrose 50% Injectable 25 Gram(s) IV Push once  dextrose 50% Injectable 25 Gram(s) IV Push once  filgrastim-sndz Injectable 480 MICROGram(s) SubCutaneous daily  heparin  Injectable 5000 Unit(s) SubCutaneous every 8 hours  insulin lispro (HumaLOG) corrective regimen sliding scale   SubCutaneous three times a day before meals  insulin lispro (HumaLOG) corrective regimen sliding scale   SubCutaneous at bedtime  lactated ringers. 1000 milliLiter(s) (100 mL/Hr) IV Continuous <Continuous>  vancomycin  IVPB      vancomycin  IVPB 1000 milliGRAM(s) IV Intermittent every 24 hours    MEDICATIONS  (PRN):  acetaminophen   Tablet .. 650 milliGRAM(s) Oral every 6 hours PRN Mild Pain (1 - 3)  dextrose 40% Gel 15 Gram(s) Oral once PRN Blood Glucose LESS THAN 70 milliGRAM(s)/deciliter  glucagon  Injectable 1 milliGRAM(s) IntraMuscular once PRN Glucose LESS THAN 70 milligrams/deciliter    OBJECTIVE:  Vital Signs Last 24 Hrs  T(C): 37.9 (07 Feb 2019 06:48), Max: 39.7 (06 Feb 2019 22:03)  T(F): 100.2 (07 Feb 2019 06:48), Max: 103.5 (06 Feb 2019 22:03)  HR: 108 (07 Feb 2019 06:48) (102 - 139)  BP: 135/71 (07 Feb 2019 06:48) (102/52 - 160/60)  BP(mean): --  RR: 26 (07 Feb 2019 06:48) (16 - 26)  SpO2: 100% (07 Feb 2019 06:48) (99% - 100%)    PE:  CONSTITUTIONAL: elderly female, resting in bed  HEAD: Normocephalic; atraumatic  EYES: EOMI, PERRLA  ENMT: External appears normal; normal oropharynx, dry mucus membranes  NECK: Supple; non-tender; no cervical lymphadenopathy, FROM  CARD: tachycardic, normal S1S2, no audible murmurs, rubs, or gallops  RESP: On BiPAP, increased respiratory effort without substernal/intercostal retractions, transmitted breath sounds from bipap machine on ausc, otherwise clear,  Rt sided port nonerythematous   ABD: Soft, non-distended; non-tender; no rebound or guarding  EXT: No LE pitting edema or calf tenderness; distal pulses intact with good capillary refill. No cyanosis   VASC: 2+ distal pulses in extremities   SKIN: Warm, dry, intact, no rashes or lesions  NEURO: AAOx3, 5/5 strength b/l UE and LE, sensation intact in all extremities,                          7.6    0.64  )-----------( 114      ( 07 Feb 2019 05:03 )             22.2     02-07    138  |  101  |  30<H>  ----------------------------<  116<H>  3.3<L>   |  20<L>  |  1.65<H>    Ca    9.3      07 Feb 2019 05:03  Phos  3.6     02-07  Mg     2.3     02-07    TPro  6.8  /  Alb  3.3  /  TBili  0.8  /  DBili  x   /  AST  43<H>  /  ALT  29  /  AlkPhos  62  02-07    Vanco Trough @5am 10.1    RVP- negative SUBJECTIVE: Pt on BIPAP (IPAP 10, EPAP 5, FiO2 40%) starting yesterday afternoon due to tachypnea in the mid-30s. The Pt had persistently febrile, tachycardia, and tachypneic overnight (@22:03 103.5 F, got 1g of APAP -> @0:00 102 F -> @ 1:21 98.8 F-> @4:31 103F, got 1 g of APAP)   overnight, Other VS also concerning for tachycardia (max 139 at 22:03 ) and tachypnea (max 30 on exam at 7:00). SpO2 was 100%%. Currently on vanc (weight based daily), cefepime, and azithromycin. Pt still notes several episodes of diarrhea overnight. Urinating well. She still notes she has a decreased appetite with little oral intake over the past 2 day. Denies pain, n/v, dizziness, chest pain, palpitations.     ROS:  	General: +fever, +chills  	HEENT: denies nasal congestion, sore throat, rhinorrhea  	Eyes: denies vision changes, blurry vision  	CV: denies chest pain, palpitations  	Resp: +SOB,  	Abdominal: denies nausea, vomiting, +diarrhea, denies abdominal pain, blood in stool, dark stool  	: denies pain with urination/changes in urination  	MSK: denies recent trauma or muscle weakness  	Neuro: denies headaches, numbness, tingling, dizziness, lightheadedness.  	Skin: denies new rashes, lesions, bruises  	Endocrine: denies recent weight loss    MEDICATIONS  (STANDING):  azithromycin  IVPB      azithromycin  IVPB 500 milliGRAM(s) IV Intermittent every 24 hours  cefepime   IVPB 2000 milliGRAM(s) IV Intermittent daily  dextrose 5%. 1000 milliLiter(s) (50 mL/Hr) IV Continuous <Continuous>  dextrose 50% Injectable 12.5 Gram(s) IV Push once  dextrose 50% Injectable 25 Gram(s) IV Push once  dextrose 50% Injectable 25 Gram(s) IV Push once  filgrastim-sndz Injectable 480 MICROGram(s) SubCutaneous daily  heparin  Injectable 5000 Unit(s) SubCutaneous every 8 hours  insulin lispro (HumaLOG) corrective regimen sliding scale   SubCutaneous three times a day before meals  insulin lispro (HumaLOG) corrective regimen sliding scale   SubCutaneous at bedtime  lactated ringers. 1000 milliLiter(s) (100 mL/Hr) IV Continuous <Continuous>  vancomycin  IVPB      vancomycin  IVPB 1000 milliGRAM(s) IV Intermittent every 24 hours    MEDICATIONS  (PRN):  acetaminophen   Tablet .. 650 milliGRAM(s) Oral every 6 hours PRN Mild Pain (1 - 3)  dextrose 40% Gel 15 Gram(s) Oral once PRN Blood Glucose LESS THAN 70 milliGRAM(s)/deciliter  glucagon  Injectable 1 milliGRAM(s) IntraMuscular once PRN Glucose LESS THAN 70 milligrams/deciliter    OBJECTIVE:  Vital Signs Last 24 Hrs  T(C): 37.9 (2019 06:48), Max: 39.7 (2019 22:03)  T(F): 100.2 (2019 06:48), Max: 103.5 (2019 22:03)  HR: 108 (2019 06:48) (102 - 139)  BP: 135/71 (2019 06:48) (102/52 - 160/60)  BP(mean): --  RR: 26 (2019 06:48) (16 - 26)  SpO2: 100% (2019 06:48) (99% - 100%)    PE:  CONSTITUTIONAL: elderly female, resting in bed  HEAD: Normocephalic; atraumatic  EYES: EOMI, PERRLA  ENMT: External appears normal; normal oropharynx, dry mucus membranes  NECK: Supple; non-tender; no cervical lymphadenopathy, FROM  CARD: tachycardic, normal S1S2, no audible murmurs, rubs, or gallops  RESP: On BiPAP, increased respiratory effort without substernal/intercostal retractions, transmitted breath sounds from bipap machine on ausc, otherwise clear,  Rt sided port nonerythematous   ABD: Soft, non-distended; non-tender; no rebound or guarding  EXT: No LE pitting edema or calf tenderness; distal pulses intact with good capillary refill. No cyanosis   VASC: 2+ distal pulses in extremities   SKIN: Warm, dry, intact, no rashes or lesions  NEURO: AAOx3, 5/5 strength b/l UE and LE, sensation intact in all extremities,                          7.6    0.64  )-----------( 114      ( 2019 05:03 )             22.2     02-07    138  |  101  |  30<H>  ----------------------------<  116<H>  3.3<L>   |  20<L>  |  1.65<H>    Ca    9.3      2019 05:03  Phos  3.6     02-  Mg     2.3     -    TPro  6.8  /  Alb  3.3  /  TBili  0.8  /  DBili  x   /  AST  43<H>  /  ALT  29  /  AlkPhos  62  02-07  PT/INR - ( 2019 05:03 )   PT: 18.0 SEC;   INR: 1.56          PTT - ( 2019 05:03 )  PTT:33.9 SEC  Urinalysis Basic - ( 2019 14:50 )    Color: YELLOW / Appearance: TURBID / S.017 / pH: 6.0  Gluc: NEGATIVE / Ketone: NEGATIVE  / Bili: NEGATIVE / Urobili: NORMAL   Blood: SMALL / Protein: 100 / Nitrite: NEGATIVE   Leuk Esterase: NEGATIVE / RBC: 1-3 / WBC 2-5   Sq Epi: x / Non Sq Epi: SMALL / Bacteria: SMALL  Urine Cr 113.20; Urine Na 38; FENa 0.4%    Vanco Trough @5am 10.1    RVP- negative SUBJECTIVE: Pt on BIPAP (IPAP 10, EPAP 5, FiO2 40%) starting yesterday afternoon due to tachypnea in the mid-30s. The Pt had persistently febrile, tachycardia, and tachypneic overnight (@22:03 103.5 F, got 1g of APAP -> @0:00 102 F -> @ 1:21 98.8 F-> @4:31 103F, got 1 g of APAP)   overnight, Other VS also concerning for tachycardia (max 139 at 22:03 ) and tachypnea (max 30 on exam at 7:00). SpO2 was 100%%. Currently on vanc (weight based daily), cefepime, and azithromycin. Pt still notes several episodes of diarrhea overnight. Urinating well. She still notes she has a decreased appetite with little oral intake over the past 2 day. Denies pain, n/v, dizziness, chest pain, palpitations.     ROS:  	General: +fever, +chills  	HEENT: denies nasal congestion, sore throat, rhinorrhea  	Eyes: denies vision changes, blurry vision  	CV: denies chest pain, palpitations  	Resp: +SOB,  	Abdominal: denies nausea, vomiting, +diarrhea, denies abdominal pain, blood in stool, dark stool  	: denies pain with urination/changes in urination  	MSK: denies recent trauma or muscle weakness  	Neuro: denies headaches, numbness, tingling, dizziness, lightheadedness.  	Skin: denies new rashes, lesions, bruises  	Endocrine: denies recent weight loss    MEDICATIONS  (STANDING):  azithromycin  IVPB      azithromycin  IVPB 500 milliGRAM(s) IV Intermittent every 24 hours  cefepime   IVPB 2000 milliGRAM(s) IV Intermittent daily  dextrose 5%. 1000 milliLiter(s) (50 mL/Hr) IV Continuous <Continuous>  dextrose 50% Injectable 12.5 Gram(s) IV Push once  dextrose 50% Injectable 25 Gram(s) IV Push once  dextrose 50% Injectable 25 Gram(s) IV Push once  filgrastim-sndz Injectable 480 MICROGram(s) SubCutaneous daily  heparin  Injectable 5000 Unit(s) SubCutaneous every 8 hours  insulin lispro (HumaLOG) corrective regimen sliding scale   SubCutaneous three times a day before meals  insulin lispro (HumaLOG) corrective regimen sliding scale   SubCutaneous at bedtime  lactated ringers. 1000 milliLiter(s) (100 mL/Hr) IV Continuous <Continuous>  vancomycin  IVPB      vancomycin  IVPB 1000 milliGRAM(s) IV Intermittent every 24 hours    MEDICATIONS  (PRN):  acetaminophen   Tablet .. 650 milliGRAM(s) Oral every 6 hours PRN Mild Pain (1 - 3)  dextrose 40% Gel 15 Gram(s) Oral once PRN Blood Glucose LESS THAN 70 milliGRAM(s)/deciliter  glucagon  Injectable 1 milliGRAM(s) IntraMuscular once PRN Glucose LESS THAN 70 milligrams/deciliter    OBJECTIVE:  Vital Signs Last 24 Hrs  T(C): 37.9 (2019 06:48), Max: 39.7 (2019 22:03)  T(F): 100.2 (2019 06:48), Max: 103.5 (2019 22:03)  HR: 108 (2019 06:48) (102 - 139)  BP: 135/71 (2019 06:48) (102/52 - 160/60)  BP(mean): --  RR: 26 (2019 06:48) (16 - 26)  SpO2: 100% (2019 06:48) (99% - 100%)    PE:  CONSTITUTIONAL: elderly female, resting in bed  HEAD: Normocephalic; atraumatic  EYES: EOMI, PERRLA  ENMT: External appears normal; normal oropharynx, dry mucus membranes  NECK: Supple; non-tender; no cervical lymphadenopathy, FROM  CARD: tachycardic, normal S1S2, no audible murmurs, rubs, or gallops  RESP: On BiPAP, increased respiratory effort without substernal/intercostal retractions, transmitted breath sounds from bipap machine on ausc, otherwise clear,  Rt sided port nonerythematous   ABD: Soft, non-distended; non-tender; no rebound or guarding  EXT: No LE pitting edema or calf tenderness; distal pulses intact with good capillary refill. No cyanosis   VASC: 2+ distal pulses in extremities   SKIN: Warm, dry, intact, no rashes or lesions  NEURO: AAOx3, 5/5 strength b/l UE and LE, sensation intact in all extremities,                          7.6    0.64  )-----------( 114      ( 2019 05:03 )             22.2     02-07    138  |  101  |  30<H>  ----------------------------<  116<H>  3.3<L>   |  20<L>  |  1.65<H>    Ca    9.3      2019 05:03  Phos  3.6     02-  Mg     2.3     -    TPro  6.8  /  Alb  3.3  /  TBili  0.8  /  DBili  x   /  AST  43<H>  /  ALT  29  /  AlkPhos  62  02-  PT/INR - ( 2019 05:03 )   PT: 18.0 SEC;   INR: 1.56          PTT - ( 2019 05:03 )  PTT:33.9 SEC  Urinalysis Basic - ( 2019 14:50 )    Color: YELLOW / Appearance: TURBID / S.017 / pH: 6.0  Gluc: NEGATIVE / Ketone: NEGATIVE  / Bili: NEGATIVE / Urobili: NORMAL   Blood: SMALL / Protein: 100 / Nitrite: NEGATIVE   Leuk Esterase: NEGATIVE / RBC: 1-3 / WBC 2-5   Sq Epi: x / Non Sq Epi: SMALL / Bacteria: SMALL  Urine Cr 113.20; Urine Na 38; FENa 0.4%    Vanco Trough @5am 10.1    RVP- negative    CTA chest:   Limited visualization of the segmental and subsegmental pulmonary   arterial branches secondary to respiratory motion artifact. No main,   right or left, or lobar pulmonary embolism.    Consolidation involving the right upper lobe with air bronchograms   compatible with pneumonia. Followup to ensure resolution.     Trace right pleural effusion.

## 2019-02-07 NOTE — PROGRESS NOTE ADULT - PROBLEM SELECTOR PLAN 10
Elevated INR of unclear etiology, ?vitamin K deficiency   - check mixing study and DIC panel   - monitor INR Elevated INR of unclear etiology  -gave empirical vit K yesterday  -mixing studies suggest the presence of a factor inhibitor  -F/u DIC panel   - monitor INR Hx of HTN, on home amlodipine-benazepril 10 mg-40 mg   -BP stable (SBP 130s)  -hold home BP meds in setting of sepsis/volume depletion/syncope  -monitor BP.

## 2019-02-07 NOTE — PROGRESS NOTE ADULT - PROBLEM SELECTOR PLAN 6
-ISABELLA on CKD likely ATN in the setting of sepsis  -Cr at admission 2.35, with a baseline of ~1.3  -Improving Cr (1.96)  -F/u with UA, urine electrolytes bladder scan  -IV hydration  -avoid nephrotoxic agents   -continue to monitor Cr. -ISABELLA on CKD likely ATN in the setting of sepsis  -Cr at admission 2.35, with a baseline of ~1.3  -Improving Cr (1.65)  -UA shoed FENa of 0.4%, likely indicating prerenal ISABELLA on the setting of CKD  -avoid nephrotoxic agents, getting IV contrast today so monitor closesly and continue IV fluids  -continue to monitor Cr. -syncope likely 2/2 orthostatic hypotension/hypovolemia in the setting of sepsis and diarrhea  -CT head neg  -EKG showed sinus tach  -BPs stable with SBP in 130s  -check, orthostatics  -check TTE  -tele monitor.

## 2019-02-08 LAB
ALBUMIN SERPL ELPH-MCNC: 2.9 G/DL — LOW (ref 3.3–5)
ALP SERPL-CCNC: 70 U/L — SIGNIFICANT CHANGE UP (ref 40–120)
ALT FLD-CCNC: 31 U/L — SIGNIFICANT CHANGE UP (ref 4–33)
ANION GAP SERPL CALC-SCNC: 18 MMO/L — HIGH (ref 7–14)
ANISOCYTOSIS BLD QL: SLIGHT — SIGNIFICANT CHANGE UP
APTT BLD: 41.4 SEC — HIGH (ref 27.5–36.3)
AST SERPL-CCNC: 48 U/L — HIGH (ref 4–32)
BASOPHILS # BLD AUTO: 0.04 K/UL — SIGNIFICANT CHANGE UP (ref 0–0.2)
BASOPHILS NFR BLD AUTO: 0.7 % — SIGNIFICANT CHANGE UP (ref 0–2)
BASOPHILS NFR SPEC: 0 % — SIGNIFICANT CHANGE UP (ref 0–2)
BILIRUB SERPL-MCNC: 0.6 MG/DL — SIGNIFICANT CHANGE UP (ref 0.2–1.2)
BLASTS # FLD: 0 % — SIGNIFICANT CHANGE UP (ref 0–0)
BLD GP AB SCN SERPL QL: NEGATIVE — SIGNIFICANT CHANGE UP
BUN SERPL-MCNC: 29 MG/DL — HIGH (ref 7–23)
BURR CELLS BLD QL SMEAR: PRESENT — SIGNIFICANT CHANGE UP
C DIFF TOX GENS STL QL NAA+PROBE: SIGNIFICANT CHANGE UP
CALCIUM SERPL-MCNC: 8.9 MG/DL — SIGNIFICANT CHANGE UP (ref 8.4–10.5)
CHLORIDE SERPL-SCNC: 106 MMOL/L — SIGNIFICANT CHANGE UP (ref 98–107)
CO2 SERPL-SCNC: 17 MMOL/L — LOW (ref 22–31)
CREAT SERPL-MCNC: 1.62 MG/DL — HIGH (ref 0.5–1.3)
EOSINOPHIL # BLD AUTO: 0 K/UL — SIGNIFICANT CHANGE UP (ref 0–0.5)
EOSINOPHIL NFR BLD AUTO: 0 % — SIGNIFICANT CHANGE UP (ref 0–6)
EOSINOPHIL NFR FLD: 0 % — SIGNIFICANT CHANGE UP (ref 0–6)
GIANT PLATELETS BLD QL SMEAR: PRESENT — SIGNIFICANT CHANGE UP
GLUCOSE BLDC GLUCOMTR-MCNC: 146 MG/DL — HIGH (ref 70–99)
GLUCOSE BLDC GLUCOMTR-MCNC: 156 MG/DL — HIGH (ref 70–99)
GLUCOSE BLDC GLUCOMTR-MCNC: 170 MG/DL — HIGH (ref 70–99)
GLUCOSE BLDC GLUCOMTR-MCNC: 173 MG/DL — HIGH (ref 70–99)
GLUCOSE BLDC GLUCOMTR-MCNC: 95 MG/DL — SIGNIFICANT CHANGE UP (ref 70–99)
GLUCOSE SERPL-MCNC: 138 MG/DL — HIGH (ref 70–99)
HCT VFR BLD CALC: 20 % — CRITICAL LOW (ref 34.5–45)
HCT VFR BLD CALC: 20.9 % — CRITICAL LOW (ref 34.5–45)
HGB BLD-MCNC: 6.7 G/DL — CRITICAL LOW (ref 11.5–15.5)
HGB BLD-MCNC: 7.2 G/DL — LOW (ref 11.5–15.5)
HYPOCHROMIA BLD QL: SLIGHT — SIGNIFICANT CHANGE UP
IMM GRANULOCYTES NFR BLD AUTO: 1.9 % — HIGH (ref 0–1.5)
INR BLD: 1.57 — HIGH (ref 0.88–1.17)
L PNEUMO AG UR QL: POSITIVE — SIGNIFICANT CHANGE UP
LYMPHOCYTES # BLD AUTO: 0.28 K/UL — LOW (ref 1–3.3)
LYMPHOCYTES # BLD AUTO: 5.2 % — LOW (ref 13–44)
LYMPHOCYTES NFR SPEC AUTO: 10.4 % — LOW (ref 13–44)
MAGNESIUM SERPL-MCNC: 2.4 MG/DL — SIGNIFICANT CHANGE UP (ref 1.6–2.6)
MANUAL SMEAR VERIFICATION: SIGNIFICANT CHANGE UP
MCHC RBC-ENTMCNC: 27.9 PG — SIGNIFICANT CHANGE UP (ref 27–34)
MCHC RBC-ENTMCNC: 28.2 PG — SIGNIFICANT CHANGE UP (ref 27–34)
MCHC RBC-ENTMCNC: 33.5 % — SIGNIFICANT CHANGE UP (ref 32–36)
MCHC RBC-ENTMCNC: 34.4 % — SIGNIFICANT CHANGE UP (ref 32–36)
MCV RBC AUTO: 82 FL — SIGNIFICANT CHANGE UP (ref 80–100)
MCV RBC AUTO: 83.3 FL — SIGNIFICANT CHANGE UP (ref 80–100)
METAMYELOCYTES # FLD: 5.2 % — HIGH (ref 0–1)
MICROCYTES BLD QL: SLIGHT — SIGNIFICANT CHANGE UP
MONOCYTES # BLD AUTO: 0.3 K/UL — SIGNIFICANT CHANGE UP (ref 0–0.9)
MONOCYTES NFR BLD AUTO: 5.6 % — SIGNIFICANT CHANGE UP (ref 2–14)
MONOCYTES NFR BLD: 9.6 % — HIGH (ref 2–9)
MYELOCYTES NFR BLD: 1.8 % — HIGH (ref 0–0)
NEUTROPHIL AB SER-ACNC: 62.6 % — SIGNIFICANT CHANGE UP (ref 43–77)
NEUTROPHILS # BLD AUTO: 4.64 K/UL — SIGNIFICANT CHANGE UP (ref 1.8–7.4)
NEUTROPHILS NFR BLD AUTO: 86.6 % — HIGH (ref 43–77)
NEUTS BAND # BLD: 10.4 % — HIGH (ref 0–6)
NRBC # BLD: 3 /100WBC — SIGNIFICANT CHANGE UP
NRBC # FLD: 0 K/UL — LOW (ref 25–125)
NRBC # FLD: 0.02 K/UL — LOW (ref 25–125)
OTHER - HEMATOLOGY %: 0 — SIGNIFICANT CHANGE UP
PHOSPHATE SERPL-MCNC: 3.6 MG/DL — SIGNIFICANT CHANGE UP (ref 2.5–4.5)
PLATELET # BLD AUTO: 105 K/UL — LOW (ref 150–400)
PLATELET # BLD AUTO: 118 K/UL — LOW (ref 150–400)
PLATELET COUNT - ESTIMATE: SIGNIFICANT CHANGE UP
PMV BLD: 11.6 FL — SIGNIFICANT CHANGE UP (ref 7–13)
PMV BLD: 9.8 FL — SIGNIFICANT CHANGE UP (ref 7–13)
POTASSIUM SERPL-MCNC: 2.9 MMOL/L — CRITICAL LOW (ref 3.5–5.3)
POTASSIUM SERPL-SCNC: 2.9 MMOL/L — CRITICAL LOW (ref 3.5–5.3)
PROMYELOCYTES # FLD: 0 % — SIGNIFICANT CHANGE UP (ref 0–0)
PROT SERPL-MCNC: 6.4 G/DL — SIGNIFICANT CHANGE UP (ref 6–8.3)
PROTHROM AB SERPL-ACNC: 17.7 SEC — HIGH (ref 9.8–13.1)
RBC # BLD: 2.4 M/UL — LOW (ref 3.8–5.2)
RBC # BLD: 2.55 M/UL — LOW (ref 3.8–5.2)
RBC # FLD: 18.9 % — HIGH (ref 10.3–14.5)
RBC # FLD: 19 % — HIGH (ref 10.3–14.5)
RH IG SCN BLD-IMP: POSITIVE — SIGNIFICANT CHANGE UP
SODIUM SERPL-SCNC: 141 MMOL/L — SIGNIFICANT CHANGE UP (ref 135–145)
TARGETS BLD QL SMEAR: SLIGHT — SIGNIFICANT CHANGE UP
VANCOMYCIN FLD-MCNC: 12.9 UG/ML — SIGNIFICANT CHANGE UP
VARIANT LYMPHS # BLD: 0 % — SIGNIFICANT CHANGE UP
WBC # BLD: 5.36 K/UL — SIGNIFICANT CHANGE UP (ref 3.8–10.5)
WBC # BLD: 9.03 K/UL — SIGNIFICANT CHANGE UP (ref 3.8–10.5)
WBC # FLD AUTO: 5.36 K/UL — SIGNIFICANT CHANGE UP (ref 3.8–10.5)
WBC # FLD AUTO: 9.03 K/UL — SIGNIFICANT CHANGE UP (ref 3.8–10.5)

## 2019-02-08 PROCEDURE — 93010 ELECTROCARDIOGRAM REPORT: CPT

## 2019-02-08 PROCEDURE — 99233 SBSQ HOSP IP/OBS HIGH 50: CPT | Mod: GC

## 2019-02-08 PROCEDURE — 99233 SBSQ HOSP IP/OBS HIGH 50: CPT

## 2019-02-08 PROCEDURE — 99223 1ST HOSP IP/OBS HIGH 75: CPT | Mod: GC

## 2019-02-08 RX ORDER — CHLORHEXIDINE GLUCONATE 213 G/1000ML
1 SOLUTION TOPICAL
Qty: 0 | Refills: 0 | Status: ACTIVE | OUTPATIENT
Start: 2019-02-08 | End: 2020-01-07

## 2019-02-08 RX ORDER — ACETAMINOPHEN 500 MG
1000 TABLET ORAL ONCE
Qty: 0 | Refills: 0 | Status: COMPLETED | OUTPATIENT
Start: 2019-02-08 | End: 2019-02-08

## 2019-02-08 RX ORDER — ACETAMINOPHEN 500 MG
650 TABLET ORAL ONCE
Qty: 0 | Refills: 0 | Status: COMPLETED | OUTPATIENT
Start: 2019-02-08 | End: 2019-02-08

## 2019-02-08 RX ORDER — POTASSIUM CHLORIDE 20 MEQ
40 PACKET (EA) ORAL ONCE
Qty: 0 | Refills: 0 | Status: DISCONTINUED | OUTPATIENT
Start: 2019-02-08 | End: 2019-02-08

## 2019-02-08 RX ORDER — POTASSIUM CHLORIDE 20 MEQ
10 PACKET (EA) ORAL
Qty: 0 | Refills: 0 | Status: COMPLETED | OUTPATIENT
Start: 2019-02-08 | End: 2019-02-08

## 2019-02-08 RX ADMIN — Medication 260 MILLIGRAM(S): at 22:21

## 2019-02-08 RX ADMIN — HEPARIN SODIUM 5000 UNIT(S): 5000 INJECTION INTRAVENOUS; SUBCUTANEOUS at 05:47

## 2019-02-08 RX ADMIN — Medication 1: at 06:52

## 2019-02-08 RX ADMIN — Medication 100 MILLIEQUIVALENT(S): at 10:36

## 2019-02-08 RX ADMIN — PIPERACILLIN AND TAZOBACTAM 25 GRAM(S): 4; .5 INJECTION, POWDER, LYOPHILIZED, FOR SOLUTION INTRAVENOUS at 14:03

## 2019-02-08 RX ADMIN — HEPARIN SODIUM 5000 UNIT(S): 5000 INJECTION INTRAVENOUS; SUBCUTANEOUS at 21:41

## 2019-02-08 RX ADMIN — CHLORHEXIDINE GLUCONATE 1 APPLICATION(S): 213 SOLUTION TOPICAL at 05:47

## 2019-02-08 RX ADMIN — Medication 100 MILLIEQUIVALENT(S): at 08:30

## 2019-02-08 RX ADMIN — AZITHROMYCIN 250 MILLIGRAM(S): 500 TABLET, FILM COATED ORAL at 18:05

## 2019-02-08 RX ADMIN — Medication 100 MILLIEQUIVALENT(S): at 09:29

## 2019-02-08 RX ADMIN — CHLORHEXIDINE GLUCONATE 1 APPLICATION(S): 213 SOLUTION TOPICAL at 16:44

## 2019-02-08 RX ADMIN — Medication 166.67 MILLIGRAM(S): at 12:39

## 2019-02-08 RX ADMIN — Medication 1000 MILLIGRAM(S): at 04:25

## 2019-02-08 RX ADMIN — Medication 400 MILLIGRAM(S): at 17:38

## 2019-02-08 RX ADMIN — PIPERACILLIN AND TAZOBACTAM 25 GRAM(S): 4; .5 INJECTION, POWDER, LYOPHILIZED, FOR SOLUTION INTRAVENOUS at 21:41

## 2019-02-08 RX ADMIN — Medication 1000 MILLIGRAM(S): at 18:34

## 2019-02-08 RX ADMIN — Medication 400 MILLIGRAM(S): at 03:55

## 2019-02-08 RX ADMIN — PIPERACILLIN AND TAZOBACTAM 25 GRAM(S): 4; .5 INJECTION, POWDER, LYOPHILIZED, FOR SOLUTION INTRAVENOUS at 05:46

## 2019-02-08 RX ADMIN — HEPARIN SODIUM 5000 UNIT(S): 5000 INJECTION INTRAVENOUS; SUBCUTANEOUS at 13:24

## 2019-02-08 NOTE — CONSULT NOTE ADULT - ASSESSMENT
a 73yo F with PMH of breast CA (dx with malignant neoplasm of right female breast and intraductal carcinoma in situ of left breast) on chemo (last treatment 8 days ago), CKD, HTN, DMT2 (not on home insulin) presenting s/p syncopal episode, found to be febrile, hypoxic requiring bipap, febrile to 103, neutropenic  now not neutropenic after Neupogen yesterday.   CT chest negative for PT but shows right upper lobe pneumonia. Initially started on Fluconazole vancomycin, zosyn, azithromycin. blood cx are negative legionella is positive. a 75yo F with PMH of breast CA (dx with malignant neoplasm of right female breast and intraductal carcinoma in situ of left breast) on chemo (last treatment 8 days ago), CKD, HTN, DMT2 (not on home insulin) presenting s/p syncopal episode, found to be febrile, hypoxic requiring bipap, febrile to 103, neutropenic  now not neutropenic after Neupogen yesterday.   CT chest negative for PT but shows right upper lobe pneumonia. Initially started on Fluconazole vancomycin, zosyn, azithromycin. blood cx are negative legionella is positive.     Right upper lobe pneumonia   Neutropenic fever (neutropenia now resolved s/p neupogen)    Suggest:  * Legionella pneumonia : c/w Azithromycin for Legionella pneumonia  *Recent neutropenia: c/w Zosyn until patient is afebrile for 24-48 hours   d/c Vancomycin     *funfal infection less likely: d/c fluconazole     *diarrhea: GI PCR negative    check C. diff

## 2019-02-08 NOTE — PROGRESS NOTE ADULT - PROBLEM SELECTOR PLAN 1
-Met sepsis criteria in the ED (T 39.4-39.7 C, -124, RR 20, lactate 2.9), with severe leukopenia (WBC 0.35) and neutropenia (0.01 K/ul), likely secondary to HCAP  -Continues to be febrile, WBC slight increase to 0.64  -Source possibly PNA in the setting of cough and opacity on CXR  -Continue IV Vancomycin and Zosyn. Given persistent fevers; started on fluconazole and will consult infectious disease.  -vanco trough 10.1, cont. weight based vanco dosing (1000mg q24hr).  -Blood cx negative at 48hrs.  -stool studies negative  -continued respiratory distress, went tachypneic last night and was taken by MICU, but now improved. Last ABG showed good oxygen saturation and low CO2.  -monitor VS q4h. -Met sepsis criteria in the ED (T 39.4-39.7 C, -124, RR 20, lactate 2.9), with severe leukopenia (WBC 0.35) and neutropenia (0.01 K/ul), likely secondary to HCAP  -Continues to be febrile  -Source possibly PNA in the setting of cough and opacity on CXR  -Continue IV Vancomycin and Zosyn for possible MRSA or gram negative PNA. Given persistent fevers; started on fluconazole and will consult infectious disease.  -+urine legionella, continue Zithromax   -vanco trough 10.1, cont. weight based vanco dosing (1000mg q24hr).  -Blood cx negative at 48hrs.  -stool studies negative thus far  -continued respiratory distress, went tachypneic last night and was taken by MICU, but now improved. Last ABG showed good oxygen saturation and low CO2.  -monitor VS q4h.

## 2019-02-08 NOTE — PROGRESS NOTE ADULT - ASSESSMENT
75yo F with PMH of breast CA on chemo (last treatment 8 days ago), CKD, HTN, DMT2 presenting s/p syncopal episode, found to be in neutropenic sepsis (T 39.4-39.7 C, -124, RR 20, lactate 2.9, WBC 0.35, 0.01 K/ul) in setting of recent chemotherapy, likely 2/2 PNA, c/b ISABELLA on CKD, having recurrent fevers respiratory distress on BiPAP.

## 2019-02-08 NOTE — PROGRESS NOTE ADULT - PROBLEM SELECTOR PLAN 6
-syncope likely 2/2 orthostatic hypotension/hypovolemia in the setting of sepsis and diarrhea  -CT head neg  -EKG showed sinus tach  -BPs stable with SBP in 130s  -check TTE  -tele monitor.

## 2019-02-08 NOTE — PROGRESS NOTE ADULT - ATTENDING COMMENTS
Sepsis likely d/t HCAP, continue IV Vanco/Zosyn/Zithromax/Fluconazole, +urine legionella, ID consult d/t persistent fevers   Acute hypoxic respiratory failure d/t PNA, continue BIPAP and wean off as tolerated   Diarrhea, stool studies so far negative, check Cdiff   Chemo induced pancytopenia, WBC improving w/ neupogen, keep Hgb above 7, monitor CBC  ISABELLA likely d/t ATN, IVF, monitor Cr  Syncope likely d/t hypovolemia, s/p IVF, check TTE  Hypokalemia, supplement K

## 2019-02-08 NOTE — PROGRESS NOTE ADULT - PROBLEM SELECTOR PLAN 2
-RLL opacity on CXR   -POCUS- intermittent B lines at Rt LL, no consolidation   -continue abx as above.  -check urine legionella  -on BIPAP (IPAP 12, EPAP 5, FiO2 40%) -RLL opacity on CXR   -POCUS- intermittent B lines at Rt LL, no consolidation   -continue abx as above.  -Urine legionella positive.  -on BIPAP (IPAP 12, EPAP 5, FiO2 40%)

## 2019-02-08 NOTE — PROGRESS NOTE ADULT - PROBLEM SELECTOR PLAN 7
-ISABELLA on CKD likely ATN in the setting of sepsis  -Cr at admission 2.35, with a baseline of ~1.3  -Improving Cr (1.65)  -UA shoed FENa of 0.4%, likely indicating prerenal ISABELLA on the setting of CKD  -continue to monitor Cr.

## 2019-02-08 NOTE — CHART NOTE - NSCHARTNOTEFT_GEN_A_CORE
MICU Transfer note:    Pt is a 75yo F with PMH of breast CA (dx with malignant neoplasm of right female breast and intraductal carcinoma in situ of left breast) on chemo (last treatment 8 days ago), CKD, HTN, DMT2 (not on home insulin) presenting s/p syncopal episode. Pt was found down and disorientated this AM by her daughter covered in feces, requiring assistance to stand up. Daughter denied abnormal movements, unknown if urinary incontinence, denied tongue bitting. The pt does not remember the events surrounding the fall, although believes she was probably getting out of bed based on location. When EMS arrived, she was hypoxic with SpO2 of 82% and A+Ox2. She has had 4 episodes of watery diarrhea, decreased appetite, and chills starting yesterday. She has also had a lingering dry cough for the past couple of days. Denied fevers, dizziness, blurry vision, congestion, SOB, chest pain, abdominal pain, n/v/c, muscle weakness, pain.     In the ED: Pt was febrile (39.4-39.7 C), tachycardic (111-124), RR of 20, and normotensive (SBP 140s). Labs showed severe leukopenia (WBC 0.36), with neutropenia (0.01 K/ul), anemia, ISABELLA, high lactate (2.9). She was given 2L NS, cefepime vanco, APAP and ibuprofen.    RRT called on  for tachypnea and respiratory distress with desaturation to 82%. Patient started on BiPap with improvement in tachypnea and work of breathing.    Patient states that she is now feeling better with BiPAP, still febrile even on the cooling blanket. Will transfer to medicine. Sign out given to the [facility] and MAR.    OBJECTIVE:  ICU Vital Signs Last 24 Hrs  T(C): 39.4 (2019 17:30), Max: 39.7 (2019 22:03)  T(F): 103 (2019 17:30), Max: 103.5 (2019 22:03)  HR: 115 (2019 17:37) (50 - 140)  BP: 114/48 (2019 17:37) (114/48 - 169/85)  BP(mean): 66 (2019 17:37) (66 - 66)  ABP: --  ABP(mean): --  RR: 30 (2019 17:37) (18 - 45)  SpO2: 100% (2019 17:37) (95% - 100%)    CAPILLARY BLOOD GLUCOSE  POCT Blood Glucose.: 105 mg/dL (2019 08:49)    PHYSICAL EXAM:  General: Comfortable on BiPap, NAD   Neck: supple  Respiratory: coarse breath sounds on right   Cardiovascular: S1S2 Tachycardic  Abdomen: soft, NTND  Extremities: no peripheral edema.   Skin: no rashes or lesions.  Neurological: alert & responsive.     HOSPITAL MEDICATIONS:  MEDICATIONS  (STANDING):  azithromycin  IVPB      azithromycin  IVPB 500 milliGRAM(s) IV Intermittent every 24 hours  dextrose 5%. 1000 milliLiter(s) (50 mL/Hr) IV Continuous <Continuous>  dextrose 50% Injectable 12.5 Gram(s) IV Push once  dextrose 50% Injectable 25 Gram(s) IV Push once  dextrose 50% Injectable 25 Gram(s) IV Push once  filgrastim-sndz Injectable 480 MICROGram(s) SubCutaneous daily  fluconAZOLE IVPB 400 milliGRAM(s) IV Intermittent once  fluconAZOLE IVPB 200 milliGRAM(s) IV Intermittent every 24 hours  heparin  Injectable 5000 Unit(s) SubCutaneous every 8 hours  insulin lispro (HumaLOG) corrective regimen sliding scale   SubCutaneous three times a day before meals  insulin lispro (HumaLOG) corrective regimen sliding scale   SubCutaneous at bedtime  lactated ringers. 1000 milliLiter(s) (2000 mL/Hr) IV Continuous <Continuous>  piperacillin/tazobactam IVPB. 3.375 Gram(s) IV Intermittent every 8 hours  sodium chloride 0.9% Bolus 1000 milliLiter(s) IV Bolus once  vancomycin  IVPB        MEDICATIONS  (PRN):  acetaminophen   Tablet .. 650 milliGRAM(s) Oral every 6 hours PRN Mild Pain (1 - 3)  dextrose 40% Gel 15 Gram(s) Oral once PRN Blood Glucose LESS THAN 70 milliGRAM(s)/deciliter  glucagon  Injectable 1 milliGRAM(s) IntraMuscular once PRN Glucose LESS THAN 70 milligrams/deciliter      LABS:  ( @ 05:03)                        7.6  0.64 )-----------( 114                 22.2    Neutrophils = 0.34 (53.0%)  Lymphocytes = 0.14 (21.9%)  Eosinophils = 0.00 (0.0%)  Basophils = 0.01 (1.6%)  Monocytes = 0.12 (18.8%)  Bands = 12.9%    WBC Trend: 0.64<--, 0.26<--, 0.18<--  Hb Trend: 7.6<--, 8.2<--, 6.6<--, 7.3<--  Plt Trend: 114<--, 106<--, 104<--, 109<--      138  |  101  |  30<H>  ----------------------------<  116<H>  3.3<L>   |  20<L>  |  1.65<H>    Ca    9.3      2019 05:03  Phos  3.6       Mg     2.3         TPro  6.8  /  Alb  3.3  /  TBili  0.8  /  DBili  x   /  AST  43<H>  /  ALT  29  /  AlkPhos  62      Creatinine Trend: 1.65<--, 1.73<--, 1.96<--, 1.89<--, 2.35<--  PT/INR - ( 2019 05:03 )   PT: 18.0 SEC;   INR: 1.56          PTT - ( 2019 05:03 )  PTT:33.9 SEC  Urinalysis Basic - ( 2019 14:50 )    Color: YELLOW / Appearance: TURBID / S.017 / pH: 6.0  Gluc: NEGATIVE / Ketone: NEGATIVE  / Bili: NEGATIVE / Urobili: NORMAL   Blood: SMALL / Protein: 100 / Nitrite: NEGATIVE   Leuk Esterase: NEGATIVE / RBC: 1-3 / WBC 2-5   Sq Epi: x / Non Sq Epi: SMALL / Bacteria: SMALL      Arterial Blood Gas:   @ 16:15  7.43/26/125/19/98.8/-6.6  ABG lactate: 1.2  Arterial Blood Gas:   @ 15:25  7.44/29/69/21/94.1/-3.9  ABG lactate: 1.4    Venous Blood Gas:   @ 05:03  7.41/34/38/22/67.1  VBG Lactate: 1.0    CARDIAC MARKERS ( 2019 16:06 )  Trop x     /  u/L<H> / CKMB 1.20 ng/mL         MICROBIOLOGY:   Blood Cx: NGTD  Legionella: Negative   RVP: Negative     RADIOLOGY:    CT:    IMPRESSION:     Limited visualization of the segmental and subsegmental pulmonary   arterial branches secondary to respiratory motion artifact. No main,   right or left, or lobar pulmonary embolism.    Consolidation involving the right upper lobe with air bronchograms   compatible with pneumonia. Followup to ensure resolution.     Trace right pleural effusion.        75yo F with PMH of breast CA (dx with malignant neoplasm of right female breast and intraductal carcinoma in situ of left breast) on chemo (last treatment 8 days ago), CKD, HTN, DMT2 admitted for neutropenic fever with sepsis 2/2 to RUL PNA with course c/b acute hypoxemic respiratory failure.     #Neuro  -alert, responsive; continue to monitor mental status closely    #Respiratory   -ABG acceptable on BiPap; patient with improved saturation & work of breathing  -will consider elective Bronchoscopy  -Broad spectrum abx; lower suspicion for PCP     #CV  -sinus tachycardia likely 2/2 to sepsis & fevers  -antipyretics; maintenance IVF    #GI  -NPO while on BiPap    #ID  -c/w broad spectrum antibiotics   -f/u sputum Cx & legionella   -BCx negative     #Heme/Onc  -c/w Xario for neutropenia     #Renal  -ISABELLA on CKD; improving with IVF    #Endocrine  -c/w ISS    #DVT ppx:   -HSQ    To Do:  - Trend ISABELLA  - Consult Pulm in AM for eval for bronch  - C/w abx  - Tylenol PRN for fever  - C/w cooling blanket  - C/w BiPAP      Lázaro Harrison MD  [id number] Internal Medicine Resident  Pager: [phone] - [place]  [phone] - [facility]

## 2019-02-08 NOTE — PROGRESS NOTE ADULT - PROBLEM SELECTOR PLAN 8
-HbA1C 7.6  -On home glimepride 1 mg, will hold   -Continue low dose insulin sliding scale with FS premeal and qhs

## 2019-02-08 NOTE — PROGRESS NOTE ADULT - SUBJECTIVE AND OBJECTIVE BOX
INTERVAL HPI/OVERNIGHT EVENTS:  Patient S&E at bedside.  Continues to have respiratory distress, on broad-spectrum abx for pneumonia.   Tmax 101.8.       VITAL SIGNS:  T(F): 99.9 (19 @ 09:27)  HR: 98 (19 @ 09:27)  BP: 122/49 (19 @ 09:27)  RR: 32 (19 @ 09:27)  SpO2: 100% (19 @ 09:27)      PHYSICAL EXAM:  Constitutional: NAD  Eyes: EOMI, sclera non-icteric  Neck: supple  Respiratory:   Cardiovascular: RRR, no M/R/G  Gastrointestinal: soft, NTND, no masses palpable, + BS, no hepatosplenomegaly  Extremities: no c/c/e  Neurological: AAOx3      MEDICATIONS  (STANDING):  azithromycin  IVPB      azithromycin  IVPB 500 milliGRAM(s) IV Intermittent every 24 hours  chlorhexidine 4% Liquid 1 Application(s) Topical two times a day  dextrose 5%. 1000 milliLiter(s) (50 mL/Hr) IV Continuous <Continuous>  dextrose 50% Injectable 12.5 Gram(s) IV Push once  dextrose 50% Injectable 25 Gram(s) IV Push once  dextrose 50% Injectable 25 Gram(s) IV Push once  filgrastim-sndz Injectable 480 MICROGram(s) SubCutaneous daily  fluconAZOLE IVPB 200 milliGRAM(s) IV Intermittent every 24 hours  heparin  Injectable 5000 Unit(s) SubCutaneous every 8 hours  insulin lispro (HumaLOG) corrective regimen sliding scale   SubCutaneous three times a day before meals  insulin lispro (HumaLOG) corrective regimen sliding scale   SubCutaneous at bedtime  piperacillin/tazobactam IVPB. 3.375 Gram(s) IV Intermittent every 8 hours  vancomycin  IVPB 1250 milliGRAM(s) IV Intermittent every 24 hours  vancomycin  IVPB        MEDICATIONS  (PRN):  acetaminophen   Tablet .. 650 milliGRAM(s) Oral every 6 hours PRN Mild Pain (1 - 3)  dextrose 40% Gel 15 Gram(s) Oral once PRN Blood Glucose LESS THAN 70 milliGRAM(s)/deciliter  glucagon  Injectable 1 milliGRAM(s) IntraMuscular once PRN Glucose LESS THAN 70 milligrams/deciliter      Allergies  No Known Allergies    Intolerances  codeine (Nausea)      LABS:                        7.2    9.03  )-----------( 118      ( 2019 09:35 )             20.9     02-08    141  |  106  |  29<H>  ----------------------------<  138<H>  2.9<LL>   |  17<L>  |  1.62<H>    Ca    8.9      2019 07:00  Phos  3.6     -  Mg     2.4     -    TPro  6.4  /  Alb  2.9<L>  /  TBili  0.6  /  DBili  x   /  AST  48<H>  /  ALT  31  /  AlkPhos  70  02-08    PT/INR - ( 2019 07:49 )   PT: 17.7 SEC;   INR: 1.57          PTT - ( 2019 07:49 )  PTT:41.4 SEC  Urinalysis Basic - ( 2019 14:50 )    Color: YELLOW / Appearance: TURBID / S.017 / pH: 6.0  Gluc: NEGATIVE / Ketone: NEGATIVE  / Bili: NEGATIVE / Urobili: NORMAL   Blood: SMALL / Protein: 100 / Nitrite: NEGATIVE   Leuk Esterase: NEGATIVE / RBC: 1-3 / WBC 2-5   Sq Epi: x / Non Sq Epi: SMALL / Bacteria: SMALL        RADIOLOGY & ADDITIONAL TESTS:  Studies reviewed. INTERVAL HPI/OVERNIGHT EVENTS:  Patient S&E at bedside.  Continues to have respiratory distress, remains on BIPAP, broad-spectrum abx for pneumonia.   Tmax 101.8.       VITAL SIGNS:  T(F): 99.9 (19 @ 09:27)  HR: 98 (19 @ 09:27)  BP: 122/49 (19 @ 09:27)  RR: 32 (19 @ 09:27)  SpO2: 100% (19 @ 09:27)      PHYSICAL EXAM:  Constitutional: on BIPAP,   Eyes: EOMI, sclera non-icteric  Neck: supple  Respiratory: BIPAP breath sounds   Cardiovascular: RRR, no M/R/G  Gastrointestinal: soft, NTND, no masses palpable  Extremities: no c/c/e  Neurological: AAOx3      MEDICATIONS  (STANDING):  azithromycin  IVPB      azithromycin  IVPB 500 milliGRAM(s) IV Intermittent every 24 hours  chlorhexidine 4% Liquid 1 Application(s) Topical two times a day  dextrose 5%. 1000 milliLiter(s) (50 mL/Hr) IV Continuous <Continuous>  dextrose 50% Injectable 12.5 Gram(s) IV Push once  dextrose 50% Injectable 25 Gram(s) IV Push once  dextrose 50% Injectable 25 Gram(s) IV Push once  filgrastim-sndz Injectable 480 MICROGram(s) SubCutaneous daily  fluconAZOLE IVPB 200 milliGRAM(s) IV Intermittent every 24 hours  heparin  Injectable 5000 Unit(s) SubCutaneous every 8 hours  insulin lispro (HumaLOG) corrective regimen sliding scale   SubCutaneous three times a day before meals  insulin lispro (HumaLOG) corrective regimen sliding scale   SubCutaneous at bedtime  piperacillin/tazobactam IVPB. 3.375 Gram(s) IV Intermittent every 8 hours  vancomycin  IVPB 1250 milliGRAM(s) IV Intermittent every 24 hours  vancomycin  IVPB        MEDICATIONS  (PRN):  acetaminophen   Tablet .. 650 milliGRAM(s) Oral every 6 hours PRN Mild Pain (1 - 3)  dextrose 40% Gel 15 Gram(s) Oral once PRN Blood Glucose LESS THAN 70 milliGRAM(s)/deciliter  glucagon  Injectable 1 milliGRAM(s) IntraMuscular once PRN Glucose LESS THAN 70 milligrams/deciliter      Allergies  No Known Allergies    Intolerances  codeine (Nausea)      LABS:                        7.2    9.03  )-----------( 118      ( 2019 09:35 )             20.9     02-08    141  |  106  |  29<H>  ----------------------------<  138<H>  2.9<LL>   |  17<L>  |  1.62<H>    Ca    8.9      2019 07:00  Phos  3.6     -  Mg     2.4         TPro  6.4  /  Alb  2.9<L>  /  TBili  0.6  /  DBili  x   /  AST  48<H>  /  ALT  31  /  AlkPhos  70  02-08    PT/INR - ( 2019 07:49 )   PT: 17.7 SEC;   INR: 1.57          PTT - ( 2019 07:49 )  PTT:41.4 SEC  Urinalysis Basic - ( 2019 14:50 )    Color: YELLOW / Appearance: TURBID / S.017 / pH: 6.0  Gluc: NEGATIVE / Ketone: NEGATIVE  / Bili: NEGATIVE / Urobili: NORMAL   Blood: SMALL / Protein: 100 / Nitrite: NEGATIVE   Leuk Esterase: NEGATIVE / RBC: 1-3 / WBC 2-5   Sq Epi: x / Non Sq Epi: SMALL / Bacteria: SMALL        RADIOLOGY & ADDITIONAL TESTS:  Studies reviewed.

## 2019-02-08 NOTE — PROGRESS NOTE ADULT - SUBJECTIVE AND OBJECTIVE BOX
Patient is a 74y old  Female who presents with a chief complaint of Syncopal Episode (2019 17:38)      SUBJECTIVE / OVERNIGHT EVENTS: Pt was transferred to MICU in the afternoon yesterday given her worsening respiratory status for a possible BAL; however, her breathing started to improve and she was brought back to the floors. This morning she is stating that she feels better. She endorses that she still feels feverish and that the cooling blanket is cold. I explained the reason why we had the cooling blanket on. She denied any pain, nausea, vomiting or diarrhea.      MEDICATIONS  (STANDING):  azithromycin  IVPB      azithromycin  IVPB 500 milliGRAM(s) IV Intermittent every 24 hours  chlorhexidine 4% Liquid 1 Application(s) Topical two times a day  dextrose 5%. 1000 milliLiter(s) (50 mL/Hr) IV Continuous <Continuous>  dextrose 50% Injectable 12.5 Gram(s) IV Push once  dextrose 50% Injectable 25 Gram(s) IV Push once  dextrose 50% Injectable 25 Gram(s) IV Push once  filgrastim-sndz Injectable 480 MICROGram(s) SubCutaneous daily  fluconAZOLE IVPB 200 milliGRAM(s) IV Intermittent every 24 hours  heparin  Injectable 5000 Unit(s) SubCutaneous every 8 hours  insulin lispro (HumaLOG) corrective regimen sliding scale   SubCutaneous three times a day before meals  insulin lispro (HumaLOG) corrective regimen sliding scale   SubCutaneous at bedtime  piperacillin/tazobactam IVPB. 3.375 Gram(s) IV Intermittent every 8 hours  vancomycin  IVPB 1250 milliGRAM(s) IV Intermittent every 24 hours  vancomycin  IVPB        MEDICATIONS  (PRN):  acetaminophen   Tablet .. 650 milliGRAM(s) Oral every 6 hours PRN Mild Pain (1 - 3)  dextrose 40% Gel 15 Gram(s) Oral once PRN Blood Glucose LESS THAN 70 milliGRAM(s)/deciliter  glucagon  Injectable 1 milliGRAM(s) IntraMuscular once PRN Glucose LESS THAN 70 milligrams/deciliter        CAPILLARY BLOOD GLUCOSE  175 (2019 18:02)      POCT Blood Glucose.: 156 mg/dL (2019 05:58)  POCT Blood Glucose.: 167 mg/dL (2019 22:18)  POCT Blood Glucose.: 175 mg/dL (2019 18:03)  POCT Blood Glucose.: 170 mg/dL (2019 16:53)  POCT Blood Glucose.: 105 mg/dL (2019 08:49)    I&O's Summary    2019 07:01  -  2019 07:00  --------------------------------------------------------  IN: 200 mL / OUT: 500 mL / NET: -300 mL    ICU Vital Signs Last 24 Hrs  T(C): 38.8 (2019 05:44), Max: 39.4 (2019 17:30)  T(F): 101.8 (2019 05:44), Max: 103 (2019 17:30)  HR: 91 (2019 07:25) (87 - 140)  BP: 128/49 (2019 05:44) (114/48 - 169/85)  BP(mean): 72 (2019 18:30) (66 - 72)  ABP: --  ABP(mean): --  RR: 23 (2019 05:44) (22 - 45)  SpO2: 100% (2019 07:25) (95% - 100%)      PHYSICAL EXAM  GENERAL: NAD, well-developed. On BIPAP currently.  HEAD:  Atraumatic, Normocephalic  EYES: EOMI, PERRLA, conjunctiva and sclera clear  NECK: Supple, No JVD  CHEST/LUNG: Clear to auscultation bilaterally. No wheezing heard.   HEART: Regular rate and rhythm; No murmurs, rubs, or gallops  ABDOMEN: Soft, Nontender, Nondistended; Bowel sounds present  EXTREMITIES:  2+ Peripheral Pulses, No clubbing, cyanosis, or edema  PSYCH: AAOx3  SKIN: No rashes or lesions    LABS:                        7.6    0.64  )-----------( 114      ( 2019 05:03 )             22.2     02-07    138  |  101  |  30<H>  ----------------------------<  116<H>  3.3<L>   |  20<L>  |  1.65<H>    Ca    9.3      2019 05:03  Phos  3.6     02-07  Mg     2.3     02-07    TPro  6.8  /  Alb  3.3  /  TBili  0.8  /  DBili  x   /  AST  43<H>  /  ALT  29  /  AlkPhos  62  02-07    PT/INR - ( 2019 05:03 )   PT: 18.0 SEC;   INR: 1.56          PTT - ( 2019 05:03 )  PTT:33.9 SEC  CARDIAC MARKERS ( 2019 16:06 )  x     / x     / 452 u/L / 1.20 ng/mL / x          Urinalysis Basic - ( 2019 14:50 )    Color: YELLOW / Appearance: TURBID / S.017 / pH: 6.0  Gluc: NEGATIVE / Ketone: NEGATIVE  / Bili: NEGATIVE / Urobili: NORMAL   Blood: SMALL / Protein: 100 / Nitrite: NEGATIVE   Leuk Esterase: NEGATIVE / RBC: 1-3 / WBC 2-5   Sq Epi: x / Non Sq Epi: SMALL / Bacteria: SMALL        RADIOLOGY & ADDITIONAL TESTS:    Imaging Personally Reviewed:  Consultant(s) Notes Reviewed:    Care Discussed with Consultants/Other Providers: Patient is a 74y old  Female who presents with a chief complaint of Syncopal Episode (2019 17:38)      SUBJECTIVE / OVERNIGHT EVENTS: Pt was transferred to MICU in the afternoon yesterday given her worsening respiratory status for a possible BAL; however, her breathing started to improve and she was brought back to the floors. This morning she is stating that she feels better. She endorses that she still feels feverish and that the cooling blanket is cold. I explained the reason why we had the cooling blanket on. She denied any pain, nausea, vomiting or diarrhea.      MEDICATIONS  (STANDING):  azithromycin  IVPB      azithromycin  IVPB 500 milliGRAM(s) IV Intermittent every 24 hours  chlorhexidine 4% Liquid 1 Application(s) Topical two times a day  dextrose 5%. 1000 milliLiter(s) (50 mL/Hr) IV Continuous <Continuous>  dextrose 50% Injectable 12.5 Gram(s) IV Push once  dextrose 50% Injectable 25 Gram(s) IV Push once  dextrose 50% Injectable 25 Gram(s) IV Push once  filgrastim-sndz Injectable 480 MICROGram(s) SubCutaneous daily  fluconAZOLE IVPB 200 milliGRAM(s) IV Intermittent every 24 hours  heparin  Injectable 5000 Unit(s) SubCutaneous every 8 hours  insulin lispro (HumaLOG) corrective regimen sliding scale   SubCutaneous three times a day before meals  insulin lispro (HumaLOG) corrective regimen sliding scale   SubCutaneous at bedtime  piperacillin/tazobactam IVPB. 3.375 Gram(s) IV Intermittent every 8 hours  vancomycin  IVPB 1250 milliGRAM(s) IV Intermittent every 24 hours  vancomycin  IVPB        MEDICATIONS  (PRN):  acetaminophen   Tablet .. 650 milliGRAM(s) Oral every 6 hours PRN Mild Pain (1 - 3)  dextrose 40% Gel 15 Gram(s) Oral once PRN Blood Glucose LESS THAN 70 milliGRAM(s)/deciliter  glucagon  Injectable 1 milliGRAM(s) IntraMuscular once PRN Glucose LESS THAN 70 milligrams/deciliter        CAPILLARY BLOOD GLUCOSE  175 (2019 18:02)      POCT Blood Glucose.: 156 mg/dL (2019 05:58)  POCT Blood Glucose.: 167 mg/dL (2019 22:18)  POCT Blood Glucose.: 175 mg/dL (2019 18:03)  POCT Blood Glucose.: 170 mg/dL (2019 16:53)  POCT Blood Glucose.: 105 mg/dL (2019 08:49)    I&O's Summary    2019 07:01  -  2019 07:00  --------------------------------------------------------  IN: 200 mL / OUT: 500 mL / NET: -300 mL    ICU Vital Signs Last 24 Hrs  T(C): 38.8 (2019 05:44), Max: 39.4 (2019 17:30)  T(F): 101.8 (2019 05:44), Max: 103 (2019 17:30)  HR: 91 (2019 07:25) (87 - 140)  BP: 128/49 (2019 05:44) (114/48 - 169/85)  BP(mean): 72 (2019 18:30) (66 - 72)  ABP: --  ABP(mean): --  RR: 23 (2019 05:44) (22 - 45)  SpO2: 100% (2019 07:25) (95% - 100%)      PHYSICAL EXAM  GENERAL: NAD, On BIPAP currently.  HEAD:  Atraumatic, Normocephalic  EYES: EOMI, PERRLA, conjunctiva and sclera clear  NECK: Supple, No JVD  CHEST/LUNG: Clear to auscultation bilaterally. No wheezing heard.   HEART: Regular rate and rhythm; No murmurs, rubs, or gallops  ABDOMEN: Soft, Nontender, Nondistended; Bowel sounds present  EXTREMITIES:  2+ Peripheral Pulses, No clubbing, cyanosis, or edema  PSYCH: AAOx3  SKIN: No rashes or lesions    LABS:                        7.6    0.64  )-----------( 114      ( 2019 05:03 )             22.2     02-07    138  |  101  |  30<H>  ----------------------------<  116<H>  3.3<L>   |  20<L>  |  1.65<H>    Ca    9.3      2019 05:03  Phos  3.6     02-07  Mg     2.3     02-07    TPro  6.8  /  Alb  3.3  /  TBili  0.8  /  DBili  x   /  AST  43<H>  /  ALT  29  /  AlkPhos  62  02-07    PT/INR - ( 2019 05:03 )   PT: 18.0 SEC;   INR: 1.56          PTT - ( 2019 05:03 )  PTT:33.9 SEC  CARDIAC MARKERS ( 2019 16:06 )  x     / x     / 452 u/L / 1.20 ng/mL / x          Urinalysis Basic - ( 2019 14:50 )    Color: YELLOW / Appearance: TURBID / S.017 / pH: 6.0  Gluc: NEGATIVE / Ketone: NEGATIVE  / Bili: NEGATIVE / Urobili: NORMAL   Blood: SMALL / Protein: 100 / Nitrite: NEGATIVE   Leuk Esterase: NEGATIVE / RBC: 1-3 / WBC 2-5   Sq Epi: x / Non Sq Epi: SMALL / Bacteria: SMALL        RADIOLOGY & ADDITIONAL TESTS:    Imaging Personally Reviewed:  Consultant(s) Notes Reviewed:    Care Discussed with Consultants/Other Providers:

## 2019-02-08 NOTE — CONSULT NOTE ADULT - SUBJECTIVE AND OBJECTIVE BOX
Patient is a 74y old  Female who presents with a chief complaint of Syncopal Episode (2019 07:49)    HPI:  Pt is a 73yo F with PMH of breast CA (dx with malignant neoplasm of right female breast and intraductal carcinoma in situ of left breast) on chemo (last treatment 8 days ago), CKD, HTN, DMT2 (not on home insulin) presenting s/p syncopal episode. Pt was found down and disorientated this AM by her daughter covered in feces, requiring assistance to stand up. Daughter denied abnormal movements, unknown if urinary incontinence, denied tongue bitting. The pt does not remember the events surrounding the fall, although believes she was probably getting out of bed based on location. When EMS arrived, she was hypoxic with SpO2 of 82% and A+Ox2. She has had 4 episodes of watery diarrhea, decreased appetite, and chills starting yesterday. She has also had a lingering dry cough for the past couple of days. Denied fevers, dizziness, blurry vision, congestion, SOB, chest pain, abdominal pain, n/v/c, muscle weakness, pain.     In the ED: Pt was febrile (39.4-39.7 C), tachycardic (111-124), RR of 20, and normotensive (SBP 140s). Labs showed severe leukopenia (WBC 0.36), with neutropenia (0.01 K/ul), anemia, ISABELLA, high lactate (2.9). She was given 2L NS, cefepime vanco, APAP and ibuprofen. (2019 15:44)    Above reviewed. Continues to be SOB requiring BIpap and reports diarrhea.     prior hospital charts reviewed [ x ]  primary team notes reviewed [x  ]  other consultant notes reviewed [ x ]    PAST MEDICAL & SURGICAL HISTORY:  CKD (chronic kidney disease) stage 3, GFR 30-59 ml/min  Intraductal carcinoma in situ of left breast  Malignant neoplasm of right breast  Carpal tunnel syndrome on left  Ovarian cyst: right  Hypertension  Primary osteoarthritis of right knee  Primary osteoarthritis of left knee  Gout  Cataract: bilateral eyes  Diabetes mellitus, type 2  CKD (chronic kidney disease)  Spinal stenosis of lumbosacral region  S/P total knee arthroplasty, right: 18  S/P carpal tunnel release: left hand-   Lipoma of chest wall: sx removal  Lipoma of neck: sx removal  History of right salpingo-oophorectomy:   S/P appendectomy:     Allergies  No Known Allergies    ANTIMICROBIALS (past 90 days)  MEDICATIONS  (STANDING):  azithromycin  IVPB   250 mL/Hr IV Intermittent (19 @ 19:14)    azithromycin  IVPB   250 mL/Hr IV Intermittent (19 @ 19:17)   250 mL/Hr IV Intermittent (19 @ 18:40)    cefepime   IVPB   100 mL/Hr IV Intermittent (19 @ 13:00)    cefepime   IVPB   100 mL/Hr IV Intermittent (19 @ 19:30)    fluconAZOLE IVPB   100 mL/Hr IV Intermittent (19 @ 17:50)    fluconAZOLE IVPB   100 mL/Hr IV Intermittent (19 @ 22:15)    piperacillin/tazobactam IVPB.   200 mL/Hr IV Intermittent (19 @ 14:50)    piperacillin/tazobactam IVPB.   25 mL/Hr IV Intermittent (19 @ 05:46)   25 mL/Hr IV Intermittent (19 @ 22:24)   25 mL/Hr IV Intermittent (19 @ 22:23)    vancomycin  IVPB   250 mL/Hr IV Intermittent (19 @ 13:46)    vancomycin  IVPB   250 mL/Hr IV Intermittent (19 @ 14:28)    vancomycin  IVPB   166.67 mL/Hr IV Intermittent (19 @ 11:30)    ANTIMICROBIALS:    azithromycin  IVPB    azithromycin  IVPB 500 every 24 hours  fluconAZOLE IVPB 200 every 24 hours  piperacillin/tazobactam IVPB. 3.375 every 8 hours  vancomycin  IVPB 1250 every 24 hours  vancomycin  IVPB        OTHER MEDS: MEDICATIONS  (STANDING):  acetaminophen   Tablet .. 650 every 6 hours PRN  dextrose 40% Gel 15 once PRN  dextrose 50% Injectable 12.5 once  dextrose 50% Injectable 25 once  dextrose 50% Injectable 25 once  filgrastim-sndz Injectable 480 daily  glucagon  Injectable 1 once PRN  heparin  Injectable 5000 every 8 hours  insulin lispro (HumaLOG) corrective regimen sliding scale  three times a day before meals  insulin lispro (HumaLOG) corrective regimen sliding scale  at bedtime      SOCIAL HISTORY:  non smoker, no alcohol use, no drugs, lives with daughters     FAMILY HISTORY:  FAther, prostate cancer, , 85  Mother, cardiac arrest, , 70     REVIEW OF SYSTEMS  [  ] ROS unobtainable because:    [ x ] All other systems negative except as noted below:	    Constitutional:  [ ] fever [ ] chills  [ ] weight loss  [ ] weakness  Skin:  [ ] rash [ ] phlebitis	  Eyes: [ ] icterus [ ] pain  [ ] discharge	  ENMT: [ ] sore throat  [ ] thrush [ ] ulcers [ ] exudates  Respiratory: [x ] dyspnea [ ] hemoptysis [ ] cough [ ] sputum	  Cardiovascular:  [ ] chest pain [ ] palpitations [ ] edema	  Gastrointestinal:  [ ] nausea [ ] vomiting [x ] diarrhea [ ] constipation [ ] pain	  Genitourinary:  [ ] dysuria [ ] frequency [ ] hematuria [ ] discharge [ ] flank pain  [ ] incontinence  Musculoskeletal:  [ ] myalgias [ ] arthralgias [ ] arthritis  [ ] back pain  Neurological:  [ ] headache [ ] seizures  [ ] confusion/altered mental status  Psychiatric:  [ ] anxiety [ ] depression	  Hematology/Lymphatics:  [ ] lymphadenopathy  Endocrine:  [ ] adrenal [ ] thyroid  Allergic/Immunologic:	 [ ] transplant [ ] seasonal    Vital Signs Last 24 Hrs  T(F): 99.9 (19 @ 09:27), Max: 103.5 (19 @ 22:03)    Vital Signs Last 24 Hrs  HR: 98 (19 @ 09:27) (87 - 140)  BP: 122/49 (19 @ 09:27) (114/48 - 161/88)  RR: 32 (19 @ 09:27)  SpO2: 100% (19 @ 09:27) (95% - 100%)  Wt(kg): --    PHYSICAL EXAM:  General: requires bipap   HEAD/EYES: anicteric, PERRL  ENT:  supple  chest wall: right sided chemoport  Cardiovascular:   S1, S2  Respiratory: clear   GI:  soft, non-tender, normal bowel sounds  :  no CVA tenderness   Musculoskeletal:  no synovitis  Neurologic:  grossly non-focal  Skin:  no rash  Lymph: no lymphadenopathy  Psychiatric:  appropriate affect  Vascular:  no phlebitis                          7.2    9.03  )-----------( 118      ( 2019 09:35 )             20.9     02-08    141  |  106  |  29<H>  ----------------------------<  138<H>  2.9<LL>   |  17<L>  |  1.62<H>    Ca    8.9      2019 07:00  Phos  3.6       Mg     2.4         TPro  6.4  /  Alb  2.9<L>  /  TBili  0.6  /  DBili  x   /  AST  48<H>  /  ALT  31  /  AlkPhos  70        Urinalysis Basic - ( 2019 14:50 )    Color: YELLOW / Appearance: TURBID / S.017 / pH: 6.0  Gluc: NEGATIVE / Ketone: NEGATIVE  / Bili: NEGATIVE / Urobili: NORMAL   Blood: SMALL / Protein: 100 / Nitrite: NEGATIVE   Leuk Esterase: NEGATIVE / RBC: 1-3 / WBC 2-5   Sq Epi: x / Non Sq Epi: SMALL / Bacteria: SMALL    MICROBIOLOGY:  Vancomycin Level, Random: 12.9 ( @ 07:00)  Vancomycin Level, Random: 10.1 ( @ 05:03)  Vancomycin Level, Random: 7.7 ( @ 06:01)    GI PCR Panel, Stool (collected 2019 15:08)  Source: FECES    Culture - Blood (collected 2019 12:27)  Source: BLOOD VENOUS  Preliminary Report (2019 12:25):    NO ORGANISMS ISOLATED    NO ORGANISMS ISOLATED AT 48 HRS.    Culture - Blood (collected 2019 12:27)  Source: BLOOD PERIPHERAL  Preliminary Report (2019 12:25):    NO ORGANISMS ISOLATED    NO ORGANISMS ISOLATED AT 48 HRS.      RADIOLOGY:  < from: CT Angio Chest w/ IV Cont (19 @ 11:00) >    Limited visualization of the segmental and subsegmental pulmonary   arterial branches secondary to respiratory motion artifact. No main,   right or left, or lobar pulmonary embolism.    Consolidation involving the right upper lobe with air bronchograms   compatible with pneumonia. Followup to ensure resolution.     Trace right pleural effusion.    < end of copied text >  < from: CT Angio Chest w/ IV Cont (19 @ 11:00) >    Limited visualization of the segmental and subsegmental pulmonary   arterial branches secondary to respiratory motion artifact. No main,   right or left, or lobar pulmonary embolism.    Consolidation involving the right upper lobe with air bronchograms   compatible with pneumonia. Followup to ensure resolution.     Trace right pleural effusion.

## 2019-02-08 NOTE — PROGRESS NOTE ADULT - PROBLEM SELECTOR PLAN 5
-chemo induced pancytopenia   -AM labs showed Hb of 7.6, transfused w/ 1 U PRBC yesterday  -neupogen as per Onc

## 2019-02-08 NOTE — CONSULT NOTE ADULT - ATTENDING COMMENTS
1) Right upper lobe pneumonia:  Urine antigen positive for legionella  Continue azithromycin for at least 14 days    With recent neutropenia and ongoing fever, Continue a broad spectrum agent like zosyn for now.  Can stop zosyn 24-48 hours after pt defervesce    Can stop vancomycin    2) Neutropenia: likely secondary to recent chemotherapy.  Pt is higher irsk for ifnectious complications  Continue to monitor ANC, now over 500    Can stop fluconazole    3) Fever: multifactorial; due to pneumonia, complicated by neutropenia  Continue to monitor. 1) Right upper lobe pneumonia:  Urine antigen positive for legionella  Continue azithromycin for at least 14 days    With recent neutropenia and ongoing fever, Continue a broad spectrum agent like zosyn for now.  Can stop zosyn 24-48 hours after pt defervesce    Can stop vancomycin    2) Neutropenia: likely secondary to recent chemotherapy.  Pt is higher irsk for ifnectious complications  Continue to monitor ANC, now over 500    Can stop fluconazole    3) Fever: multifactorial; due to pneumonia, complicated by neutropenia  Continue to monitor.    4) Diarrhea: GI PCR panel is negative  Legionella can present with GI symptoms  ? chemo related  Check C diff 74 year old with breast cancer, recent chemo, presents with neutropenic fever, diarrhea, and legionella pna      1) Right upper lobe pneumonia:  Urine antigen positive for legionella  Continue azithromycin for at least 14 days    With recent neutropenia and ongoing fever, Continue a broad spectrum agent like zosyn for now.  Can stop zosyn 24-48 hours after pt defervesce    Can stop vancomycin    2) Neutropenia: likely secondary to recent chemotherapy.  Pt is higher irsk for ifnectious complications  Continue to monitor ANC, now over 500    Can stop fluconazole    3) Fever: multifactorial; due to pneumonia, complicated by neutropenia  Continue to monitor.    4) Diarrhea: GI PCR panel is negative  Legionella can present with GI symptoms  ? chemo related  Check C diff

## 2019-02-08 NOTE — CHART NOTE - NSCHARTNOTEFT_GEN_A_CORE
MICU DOWNGRADE BRIEF ACCEPT NOTE    73yo F with PMH of breast CA (dx with malignant neoplasm of right female breast and intraductal carcinoma in situ of left breast) on chemo (last treatment 8 days ago), CKD, HTN, DMT2 (not on home insulin) presenting s/p syncopal episode. Yesterday RRT was called for tachycardia and tachypnea on BiPAP, tachypneic to 40's. During the RRT the patient was evaluated by the MICU at the bedside with US. The patient was accepted to the MICU for respiratory distress and elective intubation, though was not intubated. As MICU at capacity, she remained on the medicine floor under MICU care with plan for possible bronchoscopy. Patient is downgraded back to medicine floors tonight as there is no longer a plan for urgent bronchoscopy. She remains tachypneic, to 34-35 on BIPAP at this time but states that she "feels good" and much better than earlier today.   Per MICU signout, plan for:  - Trend ISABELLA  - Consult Pulm in AM for eval for bronch  - C/w abx  - Tylenol PRN for fever  - C/w cooling blanket  - C/w BiPAP    Emiliano Vidal, PGY-3  MAR, pager [phone]

## 2019-02-08 NOTE — PROGRESS NOTE ADULT - ASSESSMENT
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     # Sepsis: 2/2 pneumonia  - cover with broad spectrum antibiotics  - possible bronchoscopy per MICU/medicine note     # Breast cancer: TNBC, dx 2018, s/p lumpectomy, s/p 4 cycles of cyclophosphamide/docetaxel  - pancytopenia likely due to chemotherapy  - s/p neupogen x 2 doses with resolution of neutropenia  - please d/c neupogen  - will follow-up with Dr. Ruiz after discharge       Hannah Briseno MD  Hematology/Oncology Fellow, PGY-4  pager: [phone]  After 5pm or on weekends, please page the on-call fellow. 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     # Sepsis: 2/2 pneumonia  - cover with broad spectrum antibiotics  - possible bronchoscopy per MICU/medicine note     # Breast cancer: TNBC, dx 2018, s/p lumpectomy, s/p 4 cycles of cyclophosphamide/docetaxel  - pancytopenia likely due to chemotherapy  - will follow-up with Dr. Ruiz after discharge       Hannah Briseno MD  Hematology/Oncology Fellow, PGY-4  pager: [phone]  After 5pm or on weekends, please page the on-call fellow. 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     # Sepsis: 2/2 pneumonia  - cover with broad spectrum antibiotics  - possible bronchoscopy per MICU/medicine note     # Breast cancer: TNBC, dx 2018, s/p lumpectomy, s/p 4 cycles of cyclophosphamide/docetaxel  - please give one more day of neupogen to prevent rebound neutropenia  - please transfuse 1u pRBC, hgb 7.2 however patient very sick  - pancytopenia likely due to chemotherapy  - will follow-up with Dr. Ruiz after discharge       Hannah Briseno MD  Hematology/Oncology Fellow, PGY-4  pager: [phone]  After 5pm or on weekends, please page the on-call fellow.

## 2019-02-08 NOTE — PROGRESS NOTE ADULT - PROBLEM SELECTOR PLAN 3
-likely due to PNA  -no PE on CTA chest   -continue antibiotics as above   -Continue BIPAP for work of breathing.

## 2019-02-08 NOTE — PROGRESS NOTE ADULT - PROBLEM SELECTOR PLAN 9
-Hx of malignant neoplasm of right female breast and intraductal carcinoma in situ of left breast) on chemo (last treatment 1/29/19, final treatment of cyclophosphamide and docetaxel)  -Spoke with Dr. Ruiz ([phone]), does not come to [facility]  -As per house Onc; will continue Neupogen.

## 2019-02-08 NOTE — PROGRESS NOTE ADULT - ATTENDING COMMENTS
I saw the patient with the fellow and agree with the above with addendum. continue ANC until 10,000, then ok to discontinue. Consider PRBC transfusion.     Ely Oates MD

## 2019-02-08 NOTE — PROGRESS NOTE ADULT - PROBLEM SELECTOR PLAN 4
-Stool studies negative.  -monitor bowel movements.   -Continuous watery diarrhea -Stool studies negative, check Cdiff   -monitor bowel movements.   -Continuous watery diarrhea

## 2019-02-09 LAB
ALBUMIN SERPL ELPH-MCNC: 2.9 G/DL — LOW (ref 3.3–5)
ALP SERPL-CCNC: 142 U/L — HIGH (ref 40–120)
ALT FLD-CCNC: 33 U/L — SIGNIFICANT CHANGE UP (ref 4–33)
ANION GAP SERPL CALC-SCNC: 20 MMO/L — HIGH (ref 7–14)
ANISOCYTOSIS BLD QL: SLIGHT — SIGNIFICANT CHANGE UP
APTT BLD: 42 SEC — HIGH (ref 27.5–36.3)
AST SERPL-CCNC: 65 U/L — HIGH (ref 4–32)
BASE EXCESS BLDA CALC-SCNC: -10.5 MMOL/L — SIGNIFICANT CHANGE UP
BASOPHILS # BLD AUTO: 0.07 K/UL — SIGNIFICANT CHANGE UP (ref 0–0.2)
BASOPHILS NFR BLD AUTO: 0.5 % — SIGNIFICANT CHANGE UP (ref 0–2)
BASOPHILS NFR SPEC: 0 % — SIGNIFICANT CHANGE UP (ref 0–2)
BILIRUB SERPL-MCNC: 0.5 MG/DL — SIGNIFICANT CHANGE UP (ref 0.2–1.2)
BLASTS # FLD: 0 % — SIGNIFICANT CHANGE UP (ref 0–0)
BUN SERPL-MCNC: 41 MG/DL — HIGH (ref 7–23)
CALCIUM SERPL-MCNC: 9.7 MG/DL — SIGNIFICANT CHANGE UP (ref 8.4–10.5)
CHLORIDE BLDA-SCNC: 116 MMOL/L — HIGH (ref 96–108)
CHLORIDE SERPL-SCNC: 106 MMOL/L — SIGNIFICANT CHANGE UP (ref 98–107)
CO2 SERPL-SCNC: 14 MMOL/L — LOW (ref 22–31)
CREAT SERPL-MCNC: 2.18 MG/DL — HIGH (ref 0.5–1.3)
EOSINOPHIL # BLD AUTO: 0 K/UL — SIGNIFICANT CHANGE UP (ref 0–0.5)
EOSINOPHIL NFR BLD AUTO: 0 % — SIGNIFICANT CHANGE UP (ref 0–6)
EOSINOPHIL NFR FLD: 0 % — SIGNIFICANT CHANGE UP (ref 0–6)
GIANT PLATELETS BLD QL SMEAR: PRESENT — SIGNIFICANT CHANGE UP
GLUCOSE BLDA-MCNC: 181 MG/DL — HIGH (ref 70–99)
GLUCOSE BLDC GLUCOMTR-MCNC: 160 MG/DL — HIGH (ref 70–99)
GLUCOSE BLDC GLUCOMTR-MCNC: 169 MG/DL — HIGH (ref 70–99)
GLUCOSE BLDC GLUCOMTR-MCNC: 188 MG/DL — HIGH (ref 70–99)
GLUCOSE BLDC GLUCOMTR-MCNC: 201 MG/DL — HIGH (ref 70–99)
GLUCOSE SERPL-MCNC: 160 MG/DL — HIGH (ref 70–99)
HCO3 BLDA-SCNC: 17 MMOL/L — LOW (ref 22–26)
HCT VFR BLD CALC: 27.4 % — LOW (ref 34.5–45)
HCT VFR BLDA CALC: 27 % — LOW (ref 34.5–46.5)
HGB BLD-MCNC: 9.2 G/DL — LOW (ref 11.5–15.5)
HGB BLDA-MCNC: 8.7 G/DL — LOW (ref 11.5–15.5)
IMM GRANULOCYTES NFR BLD AUTO: 5.2 % — HIGH (ref 0–1.5)
INR BLD: 1.38 — HIGH (ref 0.88–1.17)
LACTATE BLDA-SCNC: 1 MMOL/L — SIGNIFICANT CHANGE UP (ref 0.5–2)
LYMPHOCYTES # BLD AUTO: 0.43 K/UL — LOW (ref 1–3.3)
LYMPHOCYTES # BLD AUTO: 2.8 % — LOW (ref 13–44)
LYMPHOCYTES NFR SPEC AUTO: 2.6 % — LOW (ref 13–44)
MACROCYTES BLD QL: SLIGHT — SIGNIFICANT CHANGE UP
MAGNESIUM SERPL-MCNC: 2.7 MG/DL — HIGH (ref 1.6–2.6)
MCHC RBC-ENTMCNC: 28.7 PG — SIGNIFICANT CHANGE UP (ref 27–34)
MCHC RBC-ENTMCNC: 33.6 % — SIGNIFICANT CHANGE UP (ref 32–36)
MCV RBC AUTO: 85.4 FL — SIGNIFICANT CHANGE UP (ref 80–100)
METAMYELOCYTES # FLD: 0.9 % — SIGNIFICANT CHANGE UP (ref 0–1)
MONOCYTES # BLD AUTO: 0.44 K/UL — SIGNIFICANT CHANGE UP (ref 0–0.9)
MONOCYTES NFR BLD AUTO: 2.9 % — SIGNIFICANT CHANGE UP (ref 2–14)
MONOCYTES NFR BLD: 3.4 % — SIGNIFICANT CHANGE UP (ref 2–9)
MYELOCYTES NFR BLD: 0 % — SIGNIFICANT CHANGE UP (ref 0–0)
NEUTROPHIL AB SER-ACNC: 91.4 % — HIGH (ref 43–77)
NEUTROPHILS # BLD AUTO: 13.55 K/UL — HIGH (ref 1.8–7.4)
NEUTROPHILS NFR BLD AUTO: 88.6 % — HIGH (ref 43–77)
NEUTS BAND # BLD: 0.8 % — SIGNIFICANT CHANGE UP (ref 0–6)
NRBC # FLD: 0.04 K/UL — LOW (ref 25–125)
OTHER - HEMATOLOGY %: 0 — SIGNIFICANT CHANGE UP
PCO2 BLDA: 21 MMHG — LOW (ref 32–48)
PH BLDA: 7.41 PH — SIGNIFICANT CHANGE UP (ref 7.35–7.45)
PHOSPHATE SERPL-MCNC: 4.2 MG/DL — SIGNIFICANT CHANGE UP (ref 2.5–4.5)
PLATELET # BLD AUTO: 136 K/UL — LOW (ref 150–400)
PLATELET COUNT - ESTIMATE: SIGNIFICANT CHANGE UP
PMV BLD: 11.5 FL — SIGNIFICANT CHANGE UP (ref 7–13)
PO2 BLDA: 107 MMHG — SIGNIFICANT CHANGE UP (ref 83–108)
POIKILOCYTOSIS BLD QL AUTO: SLIGHT — SIGNIFICANT CHANGE UP
POTASSIUM BLDA-SCNC: 3 MMOL/L — LOW (ref 3.4–4.5)
POTASSIUM SERPL-MCNC: 3.6 MMOL/L — SIGNIFICANT CHANGE UP (ref 3.5–5.3)
POTASSIUM SERPL-SCNC: 3.6 MMOL/L — SIGNIFICANT CHANGE UP (ref 3.5–5.3)
PROMYELOCYTES # FLD: 0 % — SIGNIFICANT CHANGE UP (ref 0–0)
PROT SERPL-MCNC: 7.2 G/DL — SIGNIFICANT CHANGE UP (ref 6–8.3)
PROTHROM AB SERPL-ACNC: 15.9 SEC — HIGH (ref 9.8–13.1)
RBC # BLD: 3.21 M/UL — LOW (ref 3.8–5.2)
RBC # FLD: 18.8 % — HIGH (ref 10.3–14.5)
SAO2 % BLDA: 98.1 % — SIGNIFICANT CHANGE UP (ref 95–99)
SODIUM BLDA-SCNC: 140 MMOL/L — SIGNIFICANT CHANGE UP (ref 136–146)
SODIUM SERPL-SCNC: 140 MMOL/L — SIGNIFICANT CHANGE UP (ref 135–145)
SPECIMEN SOURCE: SIGNIFICANT CHANGE UP
SPECIMEN SOURCE: SIGNIFICANT CHANGE UP
VARIANT LYMPHS # BLD: 0.9 % — SIGNIFICANT CHANGE UP
WBC # BLD: 15.29 K/UL — HIGH (ref 3.8–10.5)
WBC # FLD AUTO: 15.29 K/UL — HIGH (ref 3.8–10.5)

## 2019-02-09 PROCEDURE — 99233 SBSQ HOSP IP/OBS HIGH 50: CPT | Mod: GC

## 2019-02-09 PROCEDURE — 99291 CRITICAL CARE FIRST HOUR: CPT

## 2019-02-09 PROCEDURE — 71045 X-RAY EXAM CHEST 1 VIEW: CPT | Mod: 26

## 2019-02-09 RX ORDER — POTASSIUM CHLORIDE 20 MEQ
10 PACKET (EA) ORAL
Qty: 0 | Refills: 0 | Status: COMPLETED | OUTPATIENT
Start: 2019-02-09 | End: 2019-02-09

## 2019-02-09 RX ORDER — AZITHROMYCIN 500 MG/1
500 TABLET, FILM COATED ORAL ONCE
Qty: 0 | Refills: 0 | Status: COMPLETED | OUTPATIENT
Start: 2019-02-09 | End: 2019-02-09

## 2019-02-09 RX ORDER — ACETAMINOPHEN 500 MG
1000 TABLET ORAL ONCE
Qty: 0 | Refills: 0 | Status: COMPLETED | OUTPATIENT
Start: 2019-02-09 | End: 2019-02-09

## 2019-02-09 RX ORDER — AZITHROMYCIN 500 MG/1
TABLET, FILM COATED ORAL
Qty: 0 | Refills: 0 | Status: DISCONTINUED | OUTPATIENT
Start: 2019-02-09 | End: 2019-02-15

## 2019-02-09 RX ORDER — SODIUM CHLORIDE 9 MG/ML
1000 INJECTION, SOLUTION INTRAVENOUS
Qty: 0 | Refills: 0 | Status: DISCONTINUED | OUTPATIENT
Start: 2019-02-09 | End: 2019-02-10

## 2019-02-09 RX ORDER — AZITHROMYCIN 500 MG/1
500 TABLET, FILM COATED ORAL EVERY 24 HOURS
Qty: 0 | Refills: 0 | Status: DISCONTINUED | OUTPATIENT
Start: 2019-02-10 | End: 2019-02-15

## 2019-02-09 RX ADMIN — PIPERACILLIN AND TAZOBACTAM 25 GRAM(S): 4; .5 INJECTION, POWDER, LYOPHILIZED, FOR SOLUTION INTRAVENOUS at 05:55

## 2019-02-09 RX ADMIN — Medication 400 MILLIGRAM(S): at 23:15

## 2019-02-09 RX ADMIN — Medication 1: at 18:31

## 2019-02-09 RX ADMIN — Medication 100 MILLIEQUIVALENT(S): at 13:55

## 2019-02-09 RX ADMIN — PIPERACILLIN AND TAZOBACTAM 25 GRAM(S): 4; .5 INJECTION, POWDER, LYOPHILIZED, FOR SOLUTION INTRAVENOUS at 11:49

## 2019-02-09 RX ADMIN — Medication 2: at 12:55

## 2019-02-09 RX ADMIN — Medication 1: at 09:05

## 2019-02-09 RX ADMIN — Medication 1000 MILLIGRAM(S): at 23:36

## 2019-02-09 RX ADMIN — HEPARIN SODIUM 5000 UNIT(S): 5000 INJECTION INTRAVENOUS; SUBCUTANEOUS at 05:55

## 2019-02-09 RX ADMIN — Medication 100 MILLIEQUIVALENT(S): at 12:54

## 2019-02-09 RX ADMIN — CHLORHEXIDINE GLUCONATE 1 APPLICATION(S): 213 SOLUTION TOPICAL at 18:30

## 2019-02-09 RX ADMIN — SODIUM CHLORIDE 100 MILLILITER(S): 9 INJECTION, SOLUTION INTRAVENOUS at 15:46

## 2019-02-09 RX ADMIN — PIPERACILLIN AND TAZOBACTAM 25 GRAM(S): 4; .5 INJECTION, POWDER, LYOPHILIZED, FOR SOLUTION INTRAVENOUS at 23:18

## 2019-02-09 RX ADMIN — HEPARIN SODIUM 5000 UNIT(S): 5000 INJECTION INTRAVENOUS; SUBCUTANEOUS at 10:57

## 2019-02-09 RX ADMIN — HEPARIN SODIUM 5000 UNIT(S): 5000 INJECTION INTRAVENOUS; SUBCUTANEOUS at 23:25

## 2019-02-09 RX ADMIN — Medication 100 MILLIEQUIVALENT(S): at 10:57

## 2019-02-09 RX ADMIN — CHLORHEXIDINE GLUCONATE 1 APPLICATION(S): 213 SOLUTION TOPICAL at 05:55

## 2019-02-09 NOTE — PROGRESS NOTE ADULT - PROBLEM SELECTOR PLAN 9
-Hx of malignant neoplasm of right female breast and intraductal carcinoma in situ of left breast) on chemo (last treatment 1/29/19, final treatment of cyclophosphamide)  -Spoke with Dr. Ruiz ([phone]), does not come to [facility]  -As per house Onc; will continue Neupogen.

## 2019-02-09 NOTE — PROGRESS NOTE ADULT - PROBLEM SELECTOR PLAN 5
-chemo induced pancytopenia   -AM labs showed Hb of X, transfused w/ 1 U PRBC last night  -neupogen as per Onc -chemo induced pancytopenia  -AM labs showed Hb of 9.2, s/p transfused w/ 1 U PRBC last night  -leukopenia and neutropenia resolves (WBC 15.9; Neutrophils 13.55K/ul) s/p neupogen as per Onc -chemo induced pancytopenia  -AM labs showed Hb of 9.2, s/p transfused w/ 1 U PRBC last night  -leukopenia and neutropenia resolves (WBC 15.9; Neutrophils 13.55K/ul) s/p neupogen as per Onc  -monitor CBC

## 2019-02-09 NOTE — PROGRESS NOTE ADULT - PROBLEM SELECTOR PLAN 2
-RLL opacity on CXR   -POCUS- intermittent B lines at Rt LL, no consolidation   -continue abx as above.  -Urine legionella positive.  -on BIPAP (IPAP 12, EPAP 5, FiO2 40%)

## 2019-02-09 NOTE — PROGRESS NOTE ADULT - PROBLEM SELECTOR PLAN 1
-Met sepsis criteria in the ED (T 39.4-39.7 C, -124, RR 20, lactate 2.9), with severe leukopenia (WBC 0.35) and neutropenia (0.01 K/ul), likely secondary to HCAP  -Continues to be febrile  -Source possibly PNA in the setting of cough and opacity on CXR  -+urine legionella, continue Zithromax   -Consult infectious disease, d/raina Vanco and flucanazole  -Blood cx negative at 48hrs.  -stool studies negative thus far  -continued respiratory distress. Last ABG showed good oxygen saturation and low CO2.  -monitor VS q4h. -Met sepsis criteria in the ED (T 39.4-39.7 C, -124, RR 20, lactate 2.9), with severe leukopenia (WBC 0.35) and neutropenia (0.01 K/ul), likely secondary to HCAP  -Continues to be febrile  -Source possibly PNA in the setting of cough and opacity on CXR  -+urine legionella, continue Zithromax  -Per ID, cont. zosyn until afebrile for 24-48 hr  -Consult infectious disease, d/raina Vanco and flucanazole  -Blood cx negative at 48hrs.  -stool studies negative thus far  -continued respiratory distress. Last ABG showed good oxygen saturation and low CO2.  -monitor VS q4h. -Met sepsis criteria in the ED (T 39.4-39.7 C, -124, RR 20, lactate 2.9), with severe leukopenia (WBC 0.35) and neutropenia (0.01 K/ul), likely secondary to HCAP  -Continues to be febrile  -Source likely PNA in the setting of cough and opacity on CXR  -+urine legionella, continue Zithromax  -Per ID, cont. empiric zosyn until afebrile for 24-48 hr  -Consulted infectious disease, d/raina Vanco and flucanazole  -Blood cx negative at 48hrs.  -stool studies negative thus far  -continued respiratory distress. Last ABG showed good oxygen saturation and low CO2.  -monitor VS q4h.

## 2019-02-09 NOTE — PROGRESS NOTE ADULT - ASSESSMENT
75yo F with PMH of breast CA on chemo (last treatment 8 days ago), CKD, HTN, DMT2 presenting s/p syncopal episode, found to be in neutropenic sepsis (T 39.4-39.7 C, -124, RR 20, lactate 2.9, WBC 0.35, 0.01 K/ul) in setting of recent chemotherapy, likely 2/2 PNA 2/2 legionella, c/b ISABELLA on CKD, having recurrent fevers respiratory distress on BiPAP.

## 2019-02-09 NOTE — PROGRESS NOTE ADULT - PROBLEM SELECTOR PLAN 10
Hx of HTN, on home amlodipine-benazepril 10 mg-40 mg   -BP stable (-140s)  -hold home BP meds in setting of sepsis/volume depletion/syncope  -monitor BP.

## 2019-02-09 NOTE — CHART NOTE - NSCHARTNOTEFT_GEN_A_CORE
MICU Accept Note     CHIEF COMPLAINT: Patient is a 74y old  Female who presents with a chief complaint of Syncopal Episode (07 Feb 2019 07:24)    Interval Events:    Pt is a 75yo F with PMH of breast CA (dx with malignant neoplasm of right female breast and intraductal carcinoma in situ of left breast) on chemo (last treatment 8 days ago), CKD, HTN, DMT2 (not on home insulin) presenting s/p syncopal episode. Pt was found down and disorientated this AM by her daughter covered in feces, requiring assistance to stand up. Daughter denied abnormal movements, unknown if urinary incontinence, denied tongue bitting. The pt does not remember the events surrounding the fall, although believes she was probably getting out of bed based on location. When EMS arrived, she was hypoxic with SpO2 of 82% and A+Ox2. She has had 4 episodes of watery diarrhea, decreased appetite, and chills starting yesterday. She has also had a lingering dry cough for the past couple of days. Denied fevers, dizziness, blurry vision, congestion, SOB, chest pain, abdominal pain, n/v/c, muscle weakness, pain.     In the ED: Pt was febrile (39.4-39.7 C), tachycardic (111-124), RR of 20, and normotensive (SBP 140s). Labs showed severe leukopenia (WBC 0.36), with neutropenia (0.01 K/ul), anemia, ISABELLA, high lactate (2.9). She was given 2L NS, cefepime vanco, APAP and ibuprofen.    RRT called on 2/7 for tachypnea and respiratory distress with desaturation to 82%. Patient started on BiPap with improvement in tachypnea and work of breathing. Febrile to 101 axillary.    Vital Signs Last 24 Hrs  T(C): 37.6 (09 Feb 2019 14:53), Max: 38.3 (08 Feb 2019 20:25)  T(F): 99.7 (09 Feb 2019 14:53), Max: 101 (08 Feb 2019 20:25)  HR: 103 (09 Feb 2019 14:57) (77 - 113)  BP: 131/62 (09 Feb 2019 14:53) (112/61 - 147/59)  BP(mean): --  RR: 21 (09 Feb 2019 14:53) (21 - 36)  SpO2: 97% (09 Feb 2019 14:57) (97% - 100%)    PHYSICAL EXAM:  General: tachypneic on BiPap; alert.   Neck: supple  Respiratory: coarse breath sounds on right   Cardiovascular: S1S2 Tachycardic  Abdomen: soft, NTND  Extremities: no peripheral edema.   Skin: no rashes or lesions.  Neurological: alert & responsive.     CAPILLARY BLOOD GLUCOSE    MEDICATIONS  (STANDING):  azithromycin  IVPB 500 milliGRAM(s) IV Intermittent once  azithromycin  IVPB      chlorhexidine 4% Liquid 1 Application(s) Topical two times a day  dextrose 5%. 1000 milliLiter(s) (50 mL/Hr) IV Continuous <Continuous>  dextrose 50% Injectable 12.5 Gram(s) IV Push once  dextrose 50% Injectable 25 Gram(s) IV Push once  dextrose 50% Injectable 25 Gram(s) IV Push once  heparin  Injectable 5000 Unit(s) SubCutaneous every 8 hours  insulin lispro (HumaLOG) corrective regimen sliding scale   SubCutaneous three times a day before meals  insulin lispro (HumaLOG) corrective regimen sliding scale   SubCutaneous at bedtime  lactated ringers. 1000 milliLiter(s) (100 mL/Hr) IV Continuous <Continuous>  piperacillin/tazobactam IVPB. 3.375 Gram(s) IV Intermittent every 8 hours    MEDICATIONS  (PRN):  acetaminophen   Tablet .. 650 milliGRAM(s) Oral every 6 hours PRN Mild Pain (1 - 3)  dextrose 40% Gel 15 Gram(s) Oral once PRN Blood Glucose LESS THAN 70 milliGRAM(s)/deciliter  glucagon  Injectable 1 milliGRAM(s) IntraMuscular once PRN Glucose LESS THAN 70 milligrams/deciliter        LABS:    (02-09 @ 07:21)                      9.2  15.29 )-----------( 136                 27.4    Neutrophils = 13.55 (88.6%)  Lymphocytes = 0.43 (2.8%)  Eosinophils = 0.00 (0.0%)  Basophils = 0.07 (0.5%)  Monocytes = 0.44 (2.9%)  Bands = 0.8%    02-09    140  |  106  |  41<H>  ----------------------------<  160<H>  3.6   |  14<L>  |  2.18<H>    Ca    9.7      09 Feb 2019 07:21  Phos  4.2     02-09  Mg     2.7     02-09    TPro  7.2  /  Alb  2.9<L>  /  TBili  0.5  /  DBili  x   /  AST  65<H>  /  ALT  33  /  AlkPhos  142<H>  02-09    ( 09 Feb 2019 07:21 )   PT: 15.9 SEC;   INR: 1.38 ;       PTT:42.0 SEC        Arterial Blood Gas:  02-09 @ 18:22  7.41/21/107/17/98.1/-10.5  ABG lactate: 1.0            MICROBIOLOGY:   Blood Cx: NGTD  Legionella: +positive   RVP: Negative     RADIOLOGY:    CT:    IMPRESSION:     Limited visualization of the segmental and subsegmental pulmonary   arterial branches secondary to respiratory motion artifact. No main,   right or left, or lobar pulmonary embolism.    Consolidation involving the right upper lobe with air bronchograms   compatible with pneumonia. Followup to ensure resolution.     Trace right pleural effusion.              Assessment and Plan:   · Assessment	  75yo F with PMH of breast CA (dx with malignant neoplasm of right female breast and intraductal carcinoma in situ of left breast) on chemo (last treatment 8 days ago), CKD, HTN, DMT2 admitted for neutropenic fever with sepsis 2/2 to RUL PNA with course c/b acute hypoxemic respiratory failure 2/2 to Legionella PNA.     #Neuro  -alert, responsive; continue to monitor mental status closely    #Respiratory   -ABG with increasing respiratory alkalosis on BiPap; however oxygenating appropriately  -will monitor respiratory status closely and intubate if indicated   -c/w abx for Legionella PNA    #CV  -sinus tachycardia likely 2/2 to sepsis & fevers  -antipyretics & maintenance IVF    #GI  -NPO while on BiPap    #ID  -c/w Azithromycin & Zosyn. Will give dose of Levaquin for synergistic effect   -BCx NGTD    #Heme/Onc  -c/w Xario for neutropenia     #Renal  -ISABELLA on CKD; monitor closely; avoid nephrotoxins & renally dose medications    #Endocrine  -c/w ISS    #DVT ppx:   -HSQ     Patient Full Code    Leo Fontenot D.O.  PGY-2 EM/IM  Pager #[phone] MICU Accept Note     CHIEF COMPLAINT: Patient is a 74y old  Female who presents with a chief complaint of Syncopal Episode (07 Feb 2019 07:24)    Interval Events:    Pt is a 73yo F with PMH of breast CA (dx with malignant neoplasm of right female breast and intraductal carcinoma in situ of left breast) on chemo (last treatment 8 days ago), CKD, HTN, DMT2 (not on home insulin) presenting s/p syncopal episode. Pt was found down and disorientated this AM by her daughter covered in feces, requiring assistance to stand up. Daughter denied abnormal movements, unknown if urinary incontinence, denied tongue bitting. The pt does not remember the events surrounding the fall, although believes she was probably getting out of bed based on location. When EMS arrived, she was hypoxic with SpO2 of 82% and A+Ox2. She has had 4 episodes of watery diarrhea, decreased appetite, and chills starting yesterday. She has also had a lingering dry cough for the past couple of days. Denied fevers, dizziness, blurry vision, congestion, SOB, chest pain, abdominal pain, n/v/c, muscle weakness, pain.     In the ED: Pt was febrile (39.4-39.7 C), tachycardic (111-124), RR of 20, and normotensive (SBP 140s). Labs showed severe leukopenia (WBC 0.36), with neutropenia (0.01 K/ul), anemia, ISABELLA, high lactate (2.9). She was given 2L NS, cefepime vanco, APAP and ibuprofen.    RRT called on 2/7 for tachypnea and respiratory distress with desaturation to 82%. Patient started on BiPap with improvement in tachypnea and work of breathing. Febrile to 101 axillary.    Vital Signs Last 24 Hrs  T(C): 37.6 (09 Feb 2019 14:53), Max: 38.3 (08 Feb 2019 20:25)  T(F): 99.7 (09 Feb 2019 14:53), Max: 101 (08 Feb 2019 20:25)  HR: 103 (09 Feb 2019 14:57) (77 - 113)  BP: 131/62 (09 Feb 2019 14:53) (112/61 - 147/59)  BP(mean): --  RR: 21 (09 Feb 2019 14:53) (21 - 36)  SpO2: 97% (09 Feb 2019 14:57) (97% - 100%)    PHYSICAL EXAM:  General: tachypneic on BiPap; alert.   Neck: supple  Respiratory: coarse breath sounds on right   Cardiovascular: S1S2 Tachycardic  Abdomen: soft, NTND  Extremities: no peripheral edema.   Skin: no rashes or lesions.  Neurological: alert & responsive.     CAPILLARY BLOOD GLUCOSE    MEDICATIONS  (STANDING):  azithromycin  IVPB 500 milliGRAM(s) IV Intermittent once  azithromycin  IVPB      chlorhexidine 4% Liquid 1 Application(s) Topical two times a day  dextrose 5%. 1000 milliLiter(s) (50 mL/Hr) IV Continuous <Continuous>  dextrose 50% Injectable 12.5 Gram(s) IV Push once  dextrose 50% Injectable 25 Gram(s) IV Push once  dextrose 50% Injectable 25 Gram(s) IV Push once  heparin  Injectable 5000 Unit(s) SubCutaneous every 8 hours  insulin lispro (HumaLOG) corrective regimen sliding scale   SubCutaneous three times a day before meals  insulin lispro (HumaLOG) corrective regimen sliding scale   SubCutaneous at bedtime  lactated ringers. 1000 milliLiter(s) (100 mL/Hr) IV Continuous <Continuous>  piperacillin/tazobactam IVPB. 3.375 Gram(s) IV Intermittent every 8 hours    MEDICATIONS  (PRN):  acetaminophen   Tablet .. 650 milliGRAM(s) Oral every 6 hours PRN Mild Pain (1 - 3)  dextrose 40% Gel 15 Gram(s) Oral once PRN Blood Glucose LESS THAN 70 milliGRAM(s)/deciliter  glucagon  Injectable 1 milliGRAM(s) IntraMuscular once PRN Glucose LESS THAN 70 milligrams/deciliter        LABS:    (02-09 @ 07:21)                      9.2  15.29 )-----------( 136                 27.4    Neutrophils = 13.55 (88.6%)  Lymphocytes = 0.43 (2.8%)  Eosinophils = 0.00 (0.0%)  Basophils = 0.07 (0.5%)  Monocytes = 0.44 (2.9%)  Bands = 0.8%    02-09    140  |  106  |  41<H>  ----------------------------<  160<H>  3.6   |  14<L>  |  2.18<H>    Ca    9.7      09 Feb 2019 07:21  Phos  4.2     02-09  Mg     2.7     02-09    TPro  7.2  /  Alb  2.9<L>  /  TBili  0.5  /  DBili  x   /  AST  65<H>  /  ALT  33  /  AlkPhos  142<H>  02-09    ( 09 Feb 2019 07:21 )   PT: 15.9 SEC;   INR: 1.38 ;       PTT:42.0 SEC        Arterial Blood Gas:  02-09 @ 18:22  7.41/21/107/17/98.1/-10.5  ABG lactate: 1.0            MICROBIOLOGY:   Blood Cx: NGTD  Legionella: +positive   RVP: Negative     RADIOLOGY:    CT:    IMPRESSION:     Limited visualization of the segmental and subsegmental pulmonary   arterial branches secondary to respiratory motion artifact. No main,   right or left, or lobar pulmonary embolism.    Consolidation involving the right upper lobe with air bronchograms   compatible with pneumonia. Followup to ensure resolution.     Trace right pleural effusion.          Assessment and Plan:   · Assessment	  73yo F with PMH of breast CA (dx with malignant neoplasm of right female breast and intraductal carcinoma in situ of left breast) on chemo (last treatment 8 days ago), CKD, HTN, DMT2 admitted for neutropenic fever with sepsis 2/2 to RUL PNA with course c/b acute hypoxemic respiratory failure 2/2 to Legionella PNA.     #Neuro  -alert, responsive; continue to monitor mental status closely    #Respiratory   -ABG with increasing respiratory alkalosis on BiPap; however oxygenating appropriately  -will monitor respiratory status closely and intubate if indicated   -c/w abx for Legionella PNA    #CV  -sinus tachycardia likely 2/2 to sepsis & fevers  -antipyretics & maintenance IVF    #GI  -NPO while on BiPap    #ID  -c/w Azithromycin & Zosyn. Will give dose of Levaquin for synergistic effect   -BCx NGTD    #Heme/Onc  -c/w Xario for neutropenia   -Anemia likely 2/2 to pancytopenia in the setting of chemotherapy. Transfuse PRN to maintain Hgb >7.0    #Renal  -ISABELLA on CKD; monitor closely; avoid nephrotoxins & renally dose medications    #Endocrine  -c/w ISS    #DVT ppx:   -HSQ     Patient Full Code    Leo Fontenot D.O.  PGY-2 EM/IM  Pager #[phone] MICU Accept Note     CHIEF COMPLAINT: Patient is a 74y old  Female who presents with a chief complaint of Syncopal Episode (07 Feb 2019 07:24)    Interval Events:    Pt is a 73yo F with PMH of breast CA (dx with malignant neoplasm of right female breast and intraductal carcinoma in situ of left breast) on chemo (last treatment 8 days ago), CKD, HTN, DMT2 (not on home insulin) presenting s/p syncopal episode. Pt was found down and disorientated this AM by her daughter covered in feces, requiring assistance to stand up. Daughter denied abnormal movements, unknown if urinary incontinence, denied tongue bitting. The pt does not remember the events surrounding the fall, although believes she was probably getting out of bed based on location. When EMS arrived, she was hypoxic with SpO2 of 82% and A+Ox2. She has had 4 episodes of watery diarrhea, decreased appetite, and chills starting yesterday. She has also had a lingering dry cough for the past couple of days. Denied fevers, dizziness, blurry vision, congestion, SOB, chest pain, abdominal pain, n/v/c, muscle weakness, pain.     In the ED: Pt was febrile (39.4-39.7 C), tachycardic (111-124), RR of 20, and normotensive (SBP 140s). Labs showed severe leukopenia (WBC 0.36), with neutropenia (0.01 K/ul), anemia, ISABELLA, high lactate (2.9). She was given 2L NS, cefepime vanco, APAP and ibuprofen.    RRT called on 2/7 for tachypnea and respiratory distress with desaturation to 82%. Patient started on BiPap with improvement in tachypnea and work of breathing. Febrile to 101 axillary.    Vital Signs Last 24 Hrs  T(C): 37.6 (09 Feb 2019 14:53), Max: 38.3 (08 Feb 2019 20:25)  T(F): 99.7 (09 Feb 2019 14:53), Max: 101 (08 Feb 2019 20:25)  HR: 103 (09 Feb 2019 14:57) (77 - 113)  BP: 131/62 (09 Feb 2019 14:53) (112/61 - 147/59)  BP(mean): --  RR: 21 (09 Feb 2019 14:53) (21 - 36)  SpO2: 97% (09 Feb 2019 14:57) (97% - 100%)    PHYSICAL EXAM:  General: tachypneic on BiPap; alert.   Neck: supple  Respiratory: coarse breath sounds on right   Cardiovascular: S1S2 Tachycardic  Abdomen: soft, NTND  Extremities: no peripheral edema.   Skin: no rashes or lesions.  Neurological: alert & responsive.     CAPILLARY BLOOD GLUCOSE    MEDICATIONS  (STANDING):  azithromycin  IVPB 500 milliGRAM(s) IV Intermittent once  azithromycin  IVPB      chlorhexidine 4% Liquid 1 Application(s) Topical two times a day  dextrose 5%. 1000 milliLiter(s) (50 mL/Hr) IV Continuous <Continuous>  dextrose 50% Injectable 12.5 Gram(s) IV Push once  dextrose 50% Injectable 25 Gram(s) IV Push once  dextrose 50% Injectable 25 Gram(s) IV Push once  heparin  Injectable 5000 Unit(s) SubCutaneous every 8 hours  insulin lispro (HumaLOG) corrective regimen sliding scale   SubCutaneous three times a day before meals  insulin lispro (HumaLOG) corrective regimen sliding scale   SubCutaneous at bedtime  lactated ringers. 1000 milliLiter(s) (100 mL/Hr) IV Continuous <Continuous>  piperacillin/tazobactam IVPB. 3.375 Gram(s) IV Intermittent every 8 hours    MEDICATIONS  (PRN):  acetaminophen   Tablet .. 650 milliGRAM(s) Oral every 6 hours PRN Mild Pain (1 - 3)  dextrose 40% Gel 15 Gram(s) Oral once PRN Blood Glucose LESS THAN 70 milliGRAM(s)/deciliter  glucagon  Injectable 1 milliGRAM(s) IntraMuscular once PRN Glucose LESS THAN 70 milligrams/deciliter        LABS:    (02-09 @ 07:21)                      9.2  15.29 )-----------( 136                 27.4    Neutrophils = 13.55 (88.6%)  Lymphocytes = 0.43 (2.8%)  Eosinophils = 0.00 (0.0%)  Basophils = 0.07 (0.5%)  Monocytes = 0.44 (2.9%)  Bands = 0.8%    02-09    140  |  106  |  41<H>  ----------------------------<  160<H>  3.6   |  14<L>  |  2.18<H>    Ca    9.7      09 Feb 2019 07:21  Phos  4.2     02-09  Mg     2.7     02-09    TPro  7.2  /  Alb  2.9<L>  /  TBili  0.5  /  DBili  x   /  AST  65<H>  /  ALT  33  /  AlkPhos  142<H>  02-09    ( 09 Feb 2019 07:21 )   PT: 15.9 SEC;   INR: 1.38 ;       PTT:42.0 SEC        Arterial Blood Gas:  02-09 @ 18:22  7.41/21/107/17/98.1/-10.5  ABG lactate: 1.0            MICROBIOLOGY:   Blood Cx: NGTD  Legionella: +positive   RVP: Negative     RADIOLOGY:    CT:    IMPRESSION:     Limited visualization of the segmental and subsegmental pulmonary   arterial branches secondary to respiratory motion artifact. No main,   right or left, or lobar pulmonary embolism.    Consolidation involving the right upper lobe with air bronchograms   compatible with pneumonia. Followup to ensure resolution.     Trace right pleural effusion.          Assessment and Plan:   · Assessment	  73yo F with PMH of breast CA (dx with malignant neoplasm of right female breast and intraductal carcinoma in situ of left breast) on Cytoxan & Docetaxol , CKD, HTN, DMT2 admitted for neutropenic fever with sepsis 2/2 to RUL PNA with course c/b acute hypoxemic respiratory failure 2/2 to Legionella PNA.     #Neuro  -alert, responsive; continue to monitor mental status closely    #Respiratory   -ABG with increasing respiratory alkalosis on BiPap; however oxygenating appropriately  -will monitor respiratory status closely and intubate if indicated   -c/w abx for Legionella PNA    #CV  -sinus tachycardia likely 2/2 to sepsis & fevers  -antipyretics & maintenance IVF    #GI  -NPO while on BiPap    #ID  -c/w Azithromycin & Zosyn. Will give dose of Levaquin for synergistic effect   -BCx NGTD    #Heme/Onc  -resolution of neutropenia s/p neupogen  -Anemia likely 2/2 to pancytopenia in the setting of chemotherapy. Transfuse PRN to maintain Hgb >7.0    #Renal  -ISABELLA on CKD; monitor closely; avoid nephrotoxins & renally dose medications    #Endocrine  -c/w ISS    #DVT ppx:   -HSQ     Patient Full Code    Leo Fontenot D.O.  PGY-2 EM/IM  Pager #[phone] MICU Accept Note     CHIEF COMPLAINT: Patient is a 74y old  Female who presents with a chief complaint of Syncopal Episode (07 Feb 2019 07:24)    Interval Events:    Pt is a 75yo F with PMH of breast CA (dx with malignant neoplasm of right female breast and intraductal carcinoma in situ of left breast) on chemo (last treatment 8 days ago), CKD, HTN, DMT2 (not on home insulin) presenting s/p syncopal episode. Pt was found down and disorientated this AM by her daughter covered in feces, requiring assistance to stand up. Daughter denied abnormal movements, unknown if urinary incontinence, denied tongue bitting. The pt does not remember the events surrounding the fall, although believes she was probably getting out of bed based on location. When EMS arrived, she was hypoxic with SpO2 of 82% and A+Ox2. She has had 4 episodes of watery diarrhea, decreased appetite, and chills starting yesterday. She has also had a lingering dry cough for the past couple of days. Denied fevers, dizziness, blurry vision, congestion, SOB, chest pain, abdominal pain, n/v/c, muscle weakness, pain.     In the ED: Pt was febrile (39.4-39.7 C), tachycardic (111-124), RR of 20, and normotensive (SBP 140s). Labs showed severe leukopenia (WBC 0.36), with neutropenia (0.01 K/ul), anemia, ISABELLA, high lactate (2.9). She was given 2L NS, cefepime vanco, APAP and ibuprofen.    RRT called on 2/7 for tachypnea and respiratory distress with desaturation to 82%. Patient started on BiPap with improvement in tachypnea and work of breathing. Febrile to 101 axillary. Patient accepted to MICU then downgraded with improvement overnight on BiPap.    MICU reconsulted on 2/9 for increased work of breathing and tachypnea. Patient has since been started on treatment for Legionella PNA with Azithromycin & Zosyn. Her oxygenation has been appropriate; yet has worsening respiratory alkalosis.       Vital Signs Last 24 Hrs  T(C): 37.6 (09 Feb 2019 14:53), Max: 38.3 (08 Feb 2019 20:25)  T(F): 99.7 (09 Feb 2019 14:53), Max: 101 (08 Feb 2019 20:25)  HR: 103 (09 Feb 2019 14:57) (77 - 113)  BP: 131/62 (09 Feb 2019 14:53) (112/61 - 147/59)  BP(mean): --  RR: 21 (09 Feb 2019 14:53) (21 - 36)  SpO2: 97% (09 Feb 2019 14:57) (97% - 100%)    PHYSICAL EXAM:  General: tachypneic on BiPap; alert.   Neck: supple  Respiratory: coarse breath sounds on right   Cardiovascular: S1S2 Tachycardic  Abdomen: soft, NTND  Extremities: no peripheral edema.   Skin: no rashes or lesions.  Neurological: alert & responsive.     CAPILLARY BLOOD GLUCOSE    MEDICATIONS  (STANDING):  azithromycin  IVPB 500 milliGRAM(s) IV Intermittent once  azithromycin  IVPB      chlorhexidine 4% Liquid 1 Application(s) Topical two times a day  dextrose 5%. 1000 milliLiter(s) (50 mL/Hr) IV Continuous <Continuous>  dextrose 50% Injectable 12.5 Gram(s) IV Push once  dextrose 50% Injectable 25 Gram(s) IV Push once  dextrose 50% Injectable 25 Gram(s) IV Push once  heparin  Injectable 5000 Unit(s) SubCutaneous every 8 hours  insulin lispro (HumaLOG) corrective regimen sliding scale   SubCutaneous three times a day before meals  insulin lispro (HumaLOG) corrective regimen sliding scale   SubCutaneous at bedtime  lactated ringers. 1000 milliLiter(s) (100 mL/Hr) IV Continuous <Continuous>  piperacillin/tazobactam IVPB. 3.375 Gram(s) IV Intermittent every 8 hours    MEDICATIONS  (PRN):  acetaminophen   Tablet .. 650 milliGRAM(s) Oral every 6 hours PRN Mild Pain (1 - 3)  dextrose 40% Gel 15 Gram(s) Oral once PRN Blood Glucose LESS THAN 70 milliGRAM(s)/deciliter  glucagon  Injectable 1 milliGRAM(s) IntraMuscular once PRN Glucose LESS THAN 70 milligrams/deciliter        LABS:    (02-09 @ 07:21)                      9.2  15.29 )-----------( 136                 27.4    Neutrophils = 13.55 (88.6%)  Lymphocytes = 0.43 (2.8%)  Eosinophils = 0.00 (0.0%)  Basophils = 0.07 (0.5%)  Monocytes = 0.44 (2.9%)  Bands = 0.8%    02-09    140  |  106  |  41<H>  ----------------------------<  160<H>  3.6   |  14<L>  |  2.18<H>    Ca    9.7      09 Feb 2019 07:21  Phos  4.2     02-09  Mg     2.7     02-09    TPro  7.2  /  Alb  2.9<L>  /  TBili  0.5  /  DBili  x   /  AST  65<H>  /  ALT  33  /  AlkPhos  142<H>  02-09    ( 09 Feb 2019 07:21 )   PT: 15.9 SEC;   INR: 1.38 ;       PTT:42.0 SEC        Arterial Blood Gas:  02-09 @ 18:22  7.41/21/107/17/98.1/-10.5  ABG lactate: 1.0            MICROBIOLOGY:   Blood Cx: NGTD  Legionella: +positive   RVP: Negative     RADIOLOGY:    CT:    IMPRESSION:     Limited visualization of the segmental and subsegmental pulmonary   arterial branches secondary to respiratory motion artifact. No main,   right or left, or lobar pulmonary embolism.    Consolidation involving the right upper lobe with air bronchograms   compatible with pneumonia. Followup to ensure resolution.     Trace right pleural effusion.          Assessment and Plan:   · Assessment	  75yo F with PMH of breast CA (dx with malignant neoplasm of right female breast and intraductal carcinoma in situ of left breast) on Cytoxan & Docetaxol , CKD, HTN, DMT2 admitted for neutropenic fever with sepsis 2/2 to RUL PNA with course c/b acute hypoxemic respiratory failure 2/2 to Legionella PNA.     #Neuro  -alert, responsive; continue to monitor mental status closely    #Respiratory   -ABG with increasing respiratory alkalosis on BiPap; however oxygenating appropriately  -will monitor respiratory status closely and intubate if indicated   -c/w abx for Legionella PNA    #CV  -sinus tachycardia likely 2/2 to sepsis & fevers  -antipyretics & maintenance IVF    #GI  -NPO while on BiPap    #ID  -c/w Azithromycin & Zosyn. Will give dose of Levaquin for synergistic effect   -BCx NGTD    #Heme/Onc  -resolution of neutropenia s/p neupogen  -Anemia likely 2/2 to pancytopenia in the setting of chemotherapy. Transfuse PRN to maintain Hgb >7.0    #Renal  -ISABELLA on CKD; monitor closely; avoid nephrotoxins & renally dose medications    #Endocrine  -c/w ISS    #DVT ppx:   -HSQ     Patient Full Code    Leo Fontenot D.O.  PGY-2 EM/IM  Pager #[phone] MICU Accept Note     CHIEF COMPLAINT: Patient is a 74y old  Female who presents with a chief complaint of Syncopal Episode (07 Feb 2019 07:24)    Interval Events:    Pt is a 73yo F with PMH of breast CA (dx with malignant neoplasm of right female breast and intraductal carcinoma in situ of left breast) on chemo (last treatment 8 days ago), CKD, HTN, DMT2 (not on home insulin) presenting s/p syncopal episode. Pt was found down and disorientated this AM by her daughter covered in feces, requiring assistance to stand up. Daughter denied abnormal movements, unknown if urinary incontinence, denied tongue bitting. The pt does not remember the events surrounding the fall, although believes she was probably getting out of bed based on location. When EMS arrived, she was hypoxic with SpO2 of 82% and A+Ox2. She has had 4 episodes of watery diarrhea, decreased appetite, and chills starting yesterday. She has also had a lingering dry cough for the past couple of days. Denied fevers, dizziness, blurry vision, congestion, SOB, chest pain, abdominal pain, n/v/c, muscle weakness, pain.     In the ED: Pt was febrile (39.4-39.7 C), tachycardic (111-124), RR of 20, and normotensive (SBP 140s). Labs showed severe leukopenia (WBC 0.36), with neutropenia (0.01 K/ul), anemia, ISABELLA, high lactate (2.9). She was given 2L NS, cefepime vanco, APAP and ibuprofen.    RRT called on 2/7 for tachypnea and respiratory distress with desaturation to 82%. Patient started on BiPap with improvement in tachypnea and work of breathing. Febrile to 101 axillary. Patient accepted to MICU then downgraded with improvement overnight on BiPap.    MICU reconsulted on 2/9 for increased work of breathing and tachypnea. Patient has since been started on treatment for Legionella PNA with Azithromycin & Zosyn. Her oxygenation has been appropriate; yet has worsening respiratory alkalosis.       Vital Signs Last 24 Hrs  T(C): 37.6 (09 Feb 2019 14:53), Max: 38.3 (08 Feb 2019 20:25)  T(F): 99.7 (09 Feb 2019 14:53), Max: 101 (08 Feb 2019 20:25)  HR: 103 (09 Feb 2019 14:57) (77 - 113)  BP: 131/62 (09 Feb 2019 14:53) (112/61 - 147/59)  BP(mean): --  RR: 21 (09 Feb 2019 14:53) (21 - 36)  SpO2: 97% (09 Feb 2019 14:57) (97% - 100%)    PHYSICAL EXAM:  General: tachypneic on BiPap; alert.   Neck: supple  Respiratory: coarse breath sounds on right   Cardiovascular: S1S2 Tachycardic  Abdomen: soft, NTND  Extremities: no peripheral edema.   Skin: no rashes or lesions.  Neurological: alert & responsive.     CAPILLARY BLOOD GLUCOSE    MEDICATIONS  (STANDING):  azithromycin  IVPB 500 milliGRAM(s) IV Intermittent once  azithromycin  IVPB      chlorhexidine 4% Liquid 1 Application(s) Topical two times a day  dextrose 5%. 1000 milliLiter(s) (50 mL/Hr) IV Continuous <Continuous>  dextrose 50% Injectable 12.5 Gram(s) IV Push once  dextrose 50% Injectable 25 Gram(s) IV Push once  dextrose 50% Injectable 25 Gram(s) IV Push once  heparin  Injectable 5000 Unit(s) SubCutaneous every 8 hours  insulin lispro (HumaLOG) corrective regimen sliding scale   SubCutaneous three times a day before meals  insulin lispro (HumaLOG) corrective regimen sliding scale   SubCutaneous at bedtime  lactated ringers. 1000 milliLiter(s) (100 mL/Hr) IV Continuous <Continuous>  piperacillin/tazobactam IVPB. 3.375 Gram(s) IV Intermittent every 8 hours    MEDICATIONS  (PRN):  acetaminophen   Tablet .. 650 milliGRAM(s) Oral every 6 hours PRN Mild Pain (1 - 3)  dextrose 40% Gel 15 Gram(s) Oral once PRN Blood Glucose LESS THAN 70 milliGRAM(s)/deciliter  glucagon  Injectable 1 milliGRAM(s) IntraMuscular once PRN Glucose LESS THAN 70 milligrams/deciliter        LABS:    (02-09 @ 07:21)                      9.2  15.29 )-----------( 136                 27.4    Neutrophils = 13.55 (88.6%)  Lymphocytes = 0.43 (2.8%)  Eosinophils = 0.00 (0.0%)  Basophils = 0.07 (0.5%)  Monocytes = 0.44 (2.9%)  Bands = 0.8%    02-09    140  |  106  |  41<H>  ----------------------------<  160<H>  3.6   |  14<L>  |  2.18<H>    Ca    9.7      09 Feb 2019 07:21  Phos  4.2     02-09  Mg     2.7     02-09    TPro  7.2  /  Alb  2.9<L>  /  TBili  0.5  /  DBili  x   /  AST  65<H>  /  ALT  33  /  AlkPhos  142<H>  02-09    ( 09 Feb 2019 07:21 )   PT: 15.9 SEC;   INR: 1.38 ;       PTT:42.0 SEC        Arterial Blood Gas:  02-09 @ 18:22  7.41/21/107/17/98.1/-10.5  ABG lactate: 1.0            MICROBIOLOGY:   Blood Cx: NGTD  Legionella: +positive   RVP: Negative     RADIOLOGY:    CT:    IMPRESSION:     Limited visualization of the segmental and subsegmental pulmonary   arterial branches secondary to respiratory motion artifact. No main,   right or left, or lobar pulmonary embolism.    Consolidation involving the right upper lobe with air bronchograms   compatible with pneumonia. Followup to ensure resolution.     Trace right pleural effusion.          Assessment and Plan:   · Assessment	  73yo F with PMH of breast CA (dx with malignant neoplasm of right female breast and intraductal carcinoma in situ of left breast) on Cytoxan & Docetaxol , CKD, HTN, DMT2 admitted for neutropenic fever with sepsis 2/2 to RUL PNA with course c/b acute hypoxemic respiratory failure 2/2 to Legionella PNA.     #Neuro  -alert, responsive; continue to monitor mental status closely    #Respiratory   -ABG with increasing respiratory alkalosis on BiPap; however oxygenating appropriately  -will monitor respiratory status closely and intubate if indicated   -c/w abx for Legionella PNA    #CV  -sinus tachycardia likely 2/2 to sepsis & fevers  -antipyretics & maintenance IVF    #GI  -NPO while on BiPap    #ID  -c/w Azithromycin & Zosyn. Will give dose of Levaquin for synergistic effect   -BCx NGTD    #Heme/Onc  -resolution of neutropenia s/p neupogen  -Anemia likely 2/2 to pancytopenia in the setting of chemotherapy. Transfuse PRN to maintain Hgb >7.0    #Renal/Metabolic  -ISABELLA on CKD; monitor closely; avoid nephrotoxins & renally dose medications  -Respiratory alkalosis from tachypnea with concomitant metabolic acidosis from worsening renal function.     #Endocrine  -c/w ISS    #DVT ppx:   -HSQ     Patient Full Code    Leo Fontenot D.O.  PGY-2 EM/IM  Pager #[phone] MICU Accept Note     CHIEF COMPLAINT: Patient is a 74y old  Female who presents with a chief complaint of Syncopal Episode (07 Feb 2019 07:24)    Interval Events:    Pt is a 73yo F with PMH of breast CA (dx with malignant neoplasm of right female breast and intraductal carcinoma in situ of left breast) on chemo (last treatment 8 days ago), CKD, HTN, DMT2 (not on home insulin) presenting s/p syncopal episode. Pt was found down and disorientated this AM by her daughter covered in feces, requiring assistance to stand up. Daughter denied abnormal movements, unknown if urinary incontinence, denied tongue bitting. The pt does not remember the events surrounding the fall, although believes she was probably getting out of bed based on location. When EMS arrived, she was hypoxic with SpO2 of 82% and A+Ox2. She has had 4 episodes of watery diarrhea, decreased appetite, and chills starting yesterday. She has also had a lingering dry cough for the past couple of days. Denied fevers, dizziness, blurry vision, congestion, SOB, chest pain, abdominal pain, n/v/c, muscle weakness, pain.     In the ED: Pt was febrile (39.4-39.7 C), tachycardic (111-124), RR of 20, and normotensive (SBP 140s). Labs showed severe leukopenia (WBC 0.36), with neutropenia (0.01 K/ul), anemia, ISABELLA, high lactate (2.9). She was given 2L NS, cefepime vanco, APAP and ibuprofen.    RRT called on 2/7 for tachypnea and respiratory distress with desaturation to 82%. Patient started on BiPap with improvement in tachypnea and work of breathing. Febrile to 101 axillary. Patient accepted to MICU then downgraded with improvement overnight on BiPap.    MICU reconsulted on 2/9 for increased work of breathing and tachypnea. Patient has since been started on treatment for Legionella PNA with Azithromycin & Zosyn. Her oxygenation has been appropriate; yet has worsening respiratory alkalosis.       Vital Signs Last 24 Hrs  T(C): 37.6 (09 Feb 2019 14:53), Max: 38.3 (08 Feb 2019 20:25)  T(F): 99.7 (09 Feb 2019 14:53), Max: 101 (08 Feb 2019 20:25)  HR: 103 (09 Feb 2019 14:57) (77 - 113)  BP: 131/62 (09 Feb 2019 14:53) (112/61 - 147/59)  BP(mean): --  RR: 21 (09 Feb 2019 14:53) (21 - 36)  SpO2: 97% (09 Feb 2019 14:57) (97% - 100%)    PHYSICAL EXAM:  General: tachypneic on BiPap; alert.   Neck: supple  Respiratory: coarse breath sounds on right   Cardiovascular: S1S2 Tachycardic  Abdomen: soft, NTND  Extremities: no peripheral edema.   Skin: no rashes or lesions.  Neurological: alert & responsive.     CAPILLARY BLOOD GLUCOSE    MEDICATIONS  (STANDING):  azithromycin  IVPB 500 milliGRAM(s) IV Intermittent once  azithromycin  IVPB      chlorhexidine 4% Liquid 1 Application(s) Topical two times a day  dextrose 5%. 1000 milliLiter(s) (50 mL/Hr) IV Continuous <Continuous>  dextrose 50% Injectable 12.5 Gram(s) IV Push once  dextrose 50% Injectable 25 Gram(s) IV Push once  dextrose 50% Injectable 25 Gram(s) IV Push once  heparin  Injectable 5000 Unit(s) SubCutaneous every 8 hours  insulin lispro (HumaLOG) corrective regimen sliding scale   SubCutaneous three times a day before meals  insulin lispro (HumaLOG) corrective regimen sliding scale   SubCutaneous at bedtime  lactated ringers. 1000 milliLiter(s) (100 mL/Hr) IV Continuous <Continuous>  piperacillin/tazobactam IVPB. 3.375 Gram(s) IV Intermittent every 8 hours    MEDICATIONS  (PRN):  acetaminophen   Tablet .. 650 milliGRAM(s) Oral every 6 hours PRN Mild Pain (1 - 3)  dextrose 40% Gel 15 Gram(s) Oral once PRN Blood Glucose LESS THAN 70 milliGRAM(s)/deciliter  glucagon  Injectable 1 milliGRAM(s) IntraMuscular once PRN Glucose LESS THAN 70 milligrams/deciliter        LABS:    (02-09 @ 07:21)                      9.2  15.29 )-----------( 136                 27.4    Neutrophils = 13.55 (88.6%)  Lymphocytes = 0.43 (2.8%)  Eosinophils = 0.00 (0.0%)  Basophils = 0.07 (0.5%)  Monocytes = 0.44 (2.9%)  Bands = 0.8%    02-09    140  |  106  |  41<H>  ----------------------------<  160<H>  3.6   |  14<L>  |  2.18<H>    Ca    9.7      09 Feb 2019 07:21  Phos  4.2     02-09  Mg     2.7     02-09    TPro  7.2  /  Alb  2.9<L>  /  TBili  0.5  /  DBili  x   /  AST  65<H>  /  ALT  33  /  AlkPhos  142<H>  02-09    ( 09 Feb 2019 07:21 )   PT: 15.9 SEC;   INR: 1.38 ;       PTT:42.0 SEC        Arterial Blood Gas:  02-09 @ 18:22  7.41/21/107/17/98.1/-10.5  ABG lactate: 1.0            MICROBIOLOGY:   Blood Cx: NGTD  Legionella: +positive   RVP: Negative     RADIOLOGY:    CT:    IMPRESSION:     Limited visualization of the segmental and subsegmental pulmonary   arterial branches secondary to respiratory motion artifact. No main,   right or left, or lobar pulmonary embolism.    Consolidation involving the right upper lobe with air bronchograms   compatible with pneumonia. Followup to ensure resolution.     Trace right pleural effusion.          Assessment and Plan:   · Assessment	  73yo F with PMH of breast CA (dx with malignant neoplasm of right female breast and intraductal carcinoma in situ of left breast) on Cytoxan & Docetaxol , CKD, HTN, DMT2 admitted for neutropenic fever with sepsis 2/2 to RUL PNA with course c/b acute hypoxemic respiratory failure 2/2 to Legionella PNA.     #Neuro  -alert, responsive; continue to monitor mental status closely    #Respiratory   -ABG with increasing respiratory alkalosis on BiPap; however oxygenating appropriately  -will monitor respiratory status closely and intubate if indicated   -c/w abx for Legionella PNA    #CV  -sinus tachycardia likely 2/2 to sepsis & fevers  -antipyretics & maintenance IVF    #GI  -NPO while on BiPap    #ID  -c/w Azithromycin & Zosyn. Will give dose of Levaquin for synergistic effect   -BCx NGTD    #Heme/Onc  -resolution of neutropenia s/p neupogen  -Anemia likely 2/2 to pancytopenia in the setting of chemotherapy. Transfuse PRN to maintain Hgb >7.0    #Renal/Metabolic  -ISABELLA on CKD; monitor closely; avoid nephrotoxins & renally dose medications  -Respiratory alkalosis from tachypnea with concomitant metabolic acidosis from worsening renal function.     #Endocrine  -c/w ISS    #DVT ppx:   -HSQ     Patient Full Code    Leo Fontenot D.O.  PGY-2 EM/IM  Pager #[phone]      Attending Attestation:  Pt seen and examined with residents   75 yo on current CTx for breast CA s/p cyclophos and docetaxel on 1/28, CKD, DM2, HTN, presented with syncopal episode and found to have RUL consolidation and legionella + by urine Ag test.  She was neutrapenic on admission and treated with neupagen with good response.  Pt on Azithromycin and Zosyn; required BiPAP for increased WOB and hypoxia 2 days ago and has remained on BiPAP.  ABG's with met acidosis and overcompensating respiratory alkalosis.  Hemodynamically stable and good MS.  She is being transferred to MICU because she is in grave danger of acute decompensation and will benefit from closer monitoring and BiPAP adjustment.  She remains febrile, mildly tachycardic, BP stable  BiPAP 12/8 60%  RR mid 20's presently  O2 sat 97%    Legionella PNA with RUL consolidation  Immunocompromise, neutropenia now resolved s/p neupogen x 4 days ( MICU Accept Note     CHIEF COMPLAINT: Patient is a 74y old  Female who presents with a chief complaint of Syncopal Episode (07 Feb 2019 07:24)    Interval Events:    Pt is a 75yo F with PMH of breast CA (dx with malignant neoplasm of right female breast and intraductal carcinoma in situ of left breast) on chemo (last treatment 8 days ago), CKD, HTN, DMT2 (not on home insulin) presenting s/p syncopal episode. Pt was found down and disorientated this AM by her daughter covered in feces, requiring assistance to stand up. Daughter denied abnormal movements, unknown if urinary incontinence, denied tongue bitting. The pt does not remember the events surrounding the fall, although believes she was probably getting out of bed based on location. When EMS arrived, she was hypoxic with SpO2 of 82% and A+Ox2. She has had 4 episodes of watery diarrhea, decreased appetite, and chills starting yesterday. She has also had a lingering dry cough for the past couple of days. Denied fevers, dizziness, blurry vision, congestion, SOB, chest pain, abdominal pain, n/v/c, muscle weakness, pain.     In the ED: Pt was febrile (39.4-39.7 C), tachycardic (111-124), RR of 20, and normotensive (SBP 140s). Labs showed severe leukopenia (WBC 0.36), with neutropenia (0.01 K/ul), anemia, ISABELLA, high lactate (2.9). She was given 2L NS, cefepime vanco, APAP and ibuprofen.    RRT called on 2/7 for tachypnea and respiratory distress with desaturation to 82%. Patient started on BiPap with improvement in tachypnea and work of breathing. Febrile to 101 axillary. Patient accepted to MICU then downgraded with improvement overnight on BiPap.    MICU reconsulted on 2/9 for increased work of breathing and tachypnea. Patient has since been started on treatment for Legionella PNA with Azithromycin & Zosyn. Her oxygenation has been appropriate; yet has worsening respiratory alkalosis.       Vital Signs Last 24 Hrs  T(C): 37.6 (09 Feb 2019 14:53), Max: 38.3 (08 Feb 2019 20:25)  T(F): 99.7 (09 Feb 2019 14:53), Max: 101 (08 Feb 2019 20:25)  HR: 103 (09 Feb 2019 14:57) (77 - 113)  BP: 131/62 (09 Feb 2019 14:53) (112/61 - 147/59)  BP(mean): --  RR: 21 (09 Feb 2019 14:53) (21 - 36)  SpO2: 97% (09 Feb 2019 14:57) (97% - 100%)    PHYSICAL EXAM:  General: tachypneic on BiPap; alert.   Neck: supple  Respiratory: coarse breath sounds on right   Cardiovascular: S1S2 Tachycardic  Abdomen: soft, NTND  Extremities: no peripheral edema.   Skin: no rashes or lesions.  Neurological: alert & responsive.     CAPILLARY BLOOD GLUCOSE    MEDICATIONS  (STANDING):  azithromycin  IVPB 500 milliGRAM(s) IV Intermittent once  azithromycin  IVPB      chlorhexidine 4% Liquid 1 Application(s) Topical two times a day  dextrose 5%. 1000 milliLiter(s) (50 mL/Hr) IV Continuous <Continuous>  dextrose 50% Injectable 12.5 Gram(s) IV Push once  dextrose 50% Injectable 25 Gram(s) IV Push once  dextrose 50% Injectable 25 Gram(s) IV Push once  heparin  Injectable 5000 Unit(s) SubCutaneous every 8 hours  insulin lispro (HumaLOG) corrective regimen sliding scale   SubCutaneous three times a day before meals  insulin lispro (HumaLOG) corrective regimen sliding scale   SubCutaneous at bedtime  lactated ringers. 1000 milliLiter(s) (100 mL/Hr) IV Continuous <Continuous>  piperacillin/tazobactam IVPB. 3.375 Gram(s) IV Intermittent every 8 hours    MEDICATIONS  (PRN):  acetaminophen   Tablet .. 650 milliGRAM(s) Oral every 6 hours PRN Mild Pain (1 - 3)  dextrose 40% Gel 15 Gram(s) Oral once PRN Blood Glucose LESS THAN 70 milliGRAM(s)/deciliter  glucagon  Injectable 1 milliGRAM(s) IntraMuscular once PRN Glucose LESS THAN 70 milligrams/deciliter        LABS:    (02-09 @ 07:21)                      9.2  15.29 )-----------( 136                 27.4    Neutrophils = 13.55 (88.6%)  Lymphocytes = 0.43 (2.8%)  Eosinophils = 0.00 (0.0%)  Basophils = 0.07 (0.5%)  Monocytes = 0.44 (2.9%)  Bands = 0.8%    02-09    140  |  106  |  41<H>  ----------------------------<  160<H>  3.6   |  14<L>  |  2.18<H>    Ca    9.7      09 Feb 2019 07:21  Phos  4.2     02-09  Mg     2.7     02-09    TPro  7.2  /  Alb  2.9<L>  /  TBili  0.5  /  DBili  x   /  AST  65<H>  /  ALT  33  /  AlkPhos  142<H>  02-09    ( 09 Feb 2019 07:21 )   PT: 15.9 SEC;   INR: 1.38 ;       PTT:42.0 SEC        Arterial Blood Gas:  02-09 @ 18:22  7.41/21/107/17/98.1/-10.5  ABG lactate: 1.0            MICROBIOLOGY:   Blood Cx: NGTD  Legionella: +positive   RVP: Negative     RADIOLOGY:    CT:    IMPRESSION:     Limited visualization of the segmental and subsegmental pulmonary   arterial branches secondary to respiratory motion artifact. No main,   right or left, or lobar pulmonary embolism.    Consolidation involving the right upper lobe with air bronchograms   compatible with pneumonia. Followup to ensure resolution.     Trace right pleural effusion.          Assessment and Plan:   · Assessment	  75yo F with PMH of breast CA (dx with malignant neoplasm of right female breast and intraductal carcinoma in situ of left breast) on Cytoxan & Docetaxol , CKD, HTN, DMT2 admitted for neutropenic fever with sepsis 2/2 to RUL PNA with course c/b acute hypoxemic respiratory failure 2/2 to Legionella PNA.     #Neuro  -alert, responsive; continue to monitor mental status closely    #Respiratory   -ABG with increasing respiratory alkalosis on BiPap; however oxygenating appropriately  -will monitor respiratory status closely and intubate if indicated   -c/w abx for Legionella PNA    #CV  -sinus tachycardia likely 2/2 to sepsis & fevers  -antipyretics & maintenance IVF    #GI  -NPO while on BiPap    #ID  -c/w Azithromycin & Zosyn. Will give dose of Levaquin for synergistic effect   -BCx NGTD    #Heme/Onc  -resolution of neutropenia s/p neupogen  -Anemia likely 2/2 to pancytopenia in the setting of chemotherapy. Transfuse PRN to maintain Hgb >7.0    #Renal/Metabolic  -ISABELLA on CKD; monitor closely; avoid nephrotoxins & renally dose medications  -Respiratory alkalosis from tachypnea with concomitant metabolic acidosis from worsening renal function.     #Endocrine  -c/w ISS    #DVT ppx:   -HSQ     Patient Full Code    Leo Fontenot D.O.  PGY-2 EM/IM  Pager #[phone]      Attending Attestation:  Pt seen and examined with residents   73 yo on current CTx for breast CA s/p cyclophos and docetaxel on 1/28, CKD, DM2, HTN, presented with syncopal episode and found to have RUL consolidation and legionella + by urine Ag test.  She was neutrapenic on admission and treated with neupagen with good response.  Pt on Azithromycin and Zosyn; required BiPAP for increased WOB and hypoxia 2 days ago and has remained on BiPAP.  ABG's with met acidosis and overcompensating respiratory alkalosis.  Hemodynamically stable and good MS.  She is being transferred to MICU because she is in grave danger of acute decompensation and will benefit from closer monitoring and BiPAP adjustment.  She remains febrile, mildly tachycardic, BP stable  BiPAP 12/8 60%  RR mid 20's presently  O2 sat 97%    Legionella PNA with RUL consolidation; high WOB, BiPAP  Immunocompromise, neutropenia now resolved s/p neupogen x 4 days (10/uL -> 13K/uL ANC)    - continue Azithromycin; have given a dose of Levaquin MICU Accept Note     CHIEF COMPLAINT: Patient is a 74y old  Female who presents with a chief complaint of Syncopal Episode (07 Feb 2019 07:24)    Interval Events:    Pt is a 75yo F with PMH of breast CA (dx with malignant neoplasm of right female breast and intraductal carcinoma in situ of left breast) on chemo (last treatment 8 days ago), CKD, HTN, DMT2 (not on home insulin) presenting s/p syncopal episode. Pt was found down and disorientated this AM by her daughter covered in feces, requiring assistance to stand up. Daughter denied abnormal movements, unknown if urinary incontinence, denied tongue bitting. The pt does not remember the events surrounding the fall, although believes she was probably getting out of bed based on location. When EMS arrived, she was hypoxic with SpO2 of 82% and A+Ox2. She has had 4 episodes of watery diarrhea, decreased appetite, and chills starting yesterday. She has also had a lingering dry cough for the past couple of days. Denied fevers, dizziness, blurry vision, congestion, SOB, chest pain, abdominal pain, n/v/c, muscle weakness, pain.     In the ED: Pt was febrile (39.4-39.7 C), tachycardic (111-124), RR of 20, and normotensive (SBP 140s). Labs showed severe leukopenia (WBC 0.36), with neutropenia (0.01 K/ul), anemia, ISABELLA, high lactate (2.9). She was given 2L NS, cefepime vanco, APAP and ibuprofen.    RRT called on 2/7 for tachypnea and respiratory distress with desaturation to 82%. Patient started on BiPap with improvement in tachypnea and work of breathing. Febrile to 101 axillary. Patient accepted to MICU then downgraded with improvement overnight on BiPap.    MICU reconsulted on 2/9 for increased work of breathing and tachypnea. Patient has since been started on treatment for Legionella PNA with Azithromycin & Zosyn. Her oxygenation has been appropriate; yet has worsening respiratory alkalosis.       Vital Signs Last 24 Hrs  T(C): 37.6 (09 Feb 2019 14:53), Max: 38.3 (08 Feb 2019 20:25)  T(F): 99.7 (09 Feb 2019 14:53), Max: 101 (08 Feb 2019 20:25)  HR: 103 (09 Feb 2019 14:57) (77 - 113)  BP: 131/62 (09 Feb 2019 14:53) (112/61 - 147/59)  BP(mean): --  RR: 21 (09 Feb 2019 14:53) (21 - 36)  SpO2: 97% (09 Feb 2019 14:57) (97% - 100%)    PHYSICAL EXAM:  General: tachypneic on BiPap; alert.   Neck: supple  Respiratory: coarse breath sounds on right   Cardiovascular: S1S2 Tachycardic  Abdomen: soft, NTND  Extremities: no peripheral edema.   Skin: no rashes or lesions.  Neurological: alert & responsive.     CAPILLARY BLOOD GLUCOSE    MEDICATIONS  (STANDING):  azithromycin  IVPB 500 milliGRAM(s) IV Intermittent once  azithromycin  IVPB      chlorhexidine 4% Liquid 1 Application(s) Topical two times a day  dextrose 5%. 1000 milliLiter(s) (50 mL/Hr) IV Continuous <Continuous>  dextrose 50% Injectable 12.5 Gram(s) IV Push once  dextrose 50% Injectable 25 Gram(s) IV Push once  dextrose 50% Injectable 25 Gram(s) IV Push once  heparin  Injectable 5000 Unit(s) SubCutaneous every 8 hours  insulin lispro (HumaLOG) corrective regimen sliding scale   SubCutaneous three times a day before meals  insulin lispro (HumaLOG) corrective regimen sliding scale   SubCutaneous at bedtime  lactated ringers. 1000 milliLiter(s) (100 mL/Hr) IV Continuous <Continuous>  piperacillin/tazobactam IVPB. 3.375 Gram(s) IV Intermittent every 8 hours    MEDICATIONS  (PRN):  acetaminophen   Tablet .. 650 milliGRAM(s) Oral every 6 hours PRN Mild Pain (1 - 3)  dextrose 40% Gel 15 Gram(s) Oral once PRN Blood Glucose LESS THAN 70 milliGRAM(s)/deciliter  glucagon  Injectable 1 milliGRAM(s) IntraMuscular once PRN Glucose LESS THAN 70 milligrams/deciliter        LABS:    (02-09 @ 07:21)                      9.2  15.29 )-----------( 136                 27.4    Neutrophils = 13.55 (88.6%)  Lymphocytes = 0.43 (2.8%)  Eosinophils = 0.00 (0.0%)  Basophils = 0.07 (0.5%)  Monocytes = 0.44 (2.9%)  Bands = 0.8%    02-09    140  |  106  |  41<H>  ----------------------------<  160<H>  3.6   |  14<L>  |  2.18<H>    Ca    9.7      09 Feb 2019 07:21  Phos  4.2     02-09  Mg     2.7     02-09    TPro  7.2  /  Alb  2.9<L>  /  TBili  0.5  /  DBili  x   /  AST  65<H>  /  ALT  33  /  AlkPhos  142<H>  02-09    ( 09 Feb 2019 07:21 )   PT: 15.9 SEC;   INR: 1.38 ;       PTT:42.0 SEC        Arterial Blood Gas:  02-09 @ 18:22  7.41/21/107/17/98.1/-10.5  ABG lactate: 1.0            MICROBIOLOGY:   Blood Cx: NGTD  Legionella: +positive   RVP: Negative     RADIOLOGY:    CT:    IMPRESSION:     Limited visualization of the segmental and subsegmental pulmonary   arterial branches secondary to respiratory motion artifact. No main,   right or left, or lobar pulmonary embolism.    Consolidation involving the right upper lobe with air bronchograms   compatible with pneumonia. Followup to ensure resolution.     Trace right pleural effusion.          Assessment and Plan:   · Assessment	  75yo F with PMH of breast CA (dx with malignant neoplasm of right female breast and intraductal carcinoma in situ of left breast) on Cytoxan & Docetaxol , CKD, HTN, DMT2 admitted for neutropenic fever with sepsis 2/2 to RUL PNA with course c/b acute hypoxemic respiratory failure 2/2 to Legionella PNA.     #Neuro  -alert, responsive; continue to monitor mental status closely    #Respiratory   -ABG with increasing respiratory alkalosis on BiPap; however oxygenating appropriately  -will monitor respiratory status closely and intubate if indicated   -c/w abx for Legionella PNA    #CV  -sinus tachycardia likely 2/2 to sepsis & fevers  -antipyretics & maintenance IVF    #GI  -NPO while on BiPap    #ID  -c/w Azithromycin & Zosyn. Will give dose of Levaquin for synergistic effect   -BCx NGTD    #Heme/Onc  -resolution of neutropenia s/p neupogen  -Anemia likely 2/2 to pancytopenia in the setting of chemotherapy. Transfuse PRN to maintain Hgb >7.0    #Renal/Metabolic  -ISABELLA on CKD; monitor closely; avoid nephrotoxins & renally dose medications  -Respiratory alkalosis from tachypnea with concomitant metabolic acidosis from worsening renal function.     #Endocrine  -c/w ISS    #DVT ppx:   -HSQ     Patient Full Code    Leo Fontenot D.O.  PGY-2 EM/IM  Pager #[phone]      Attending Attestation:  Pt seen and examined with residents   73 yo on current CTx for breast CA s/p cyclophos and docetaxel on 1/28, CKD, DM2, HTN, presented with syncopal episode and found to have RUL consolidation and legionella + by urine Ag test.  She was neutrapenic on admission and treated with neupagen with good response.  Pt on Azithromycin and Zosyn; required BiPAP for increased WOB and hypoxia 2 days ago and has remained on BiPAP.  ABG's with met acidosis and overcompensating respiratory alkalosis.  Hemodynamically stable and good MS.  She is being transferred to MICU because she is in grave danger of acute decompensation and will benefit from closer monitoring and BiPAP adjustment.  She remains febrile, mildly tachycardic, BP stable  BiPAP 12/8 60%  RR mid 20's presently  O2 sat 97%    Legionella PNA with RUL consolidation; high WOB, BiPAP  Immunocompromise, neutropenia now resolved s/p neupogen x 4 days (10/uL -> 13K/uL ANC)    - continue Azithromycin; have given a dose of Levaquin; consider changing to Levaquin or continuing both.  Watch worsening renal function and adjust dosing; could consider steroids if worsens  - continue zosyn while febrile and for 2 days after defervescence for neutropenic fever  - cont BiPAP adjust as necessary  - d/c neupogen  - critically ill; guarded  - cc time spent 40 min  - agree with above eval/PE/assessment and plan except as added here

## 2019-02-09 NOTE — PROGRESS NOTE ADULT - SUBJECTIVE AND OBJECTIVE BOX
Chief compliant: syncope 2/2 neutropenic sepsis 2/2 legionella + PNA    SUBJECTIVE: Patient seen and examined at bedside. Temperature has remained on the high side of normal (mostly 99s) and HR intermittently tachy. Continues to be tachypneic on BiPAP (RR in mid/high 20s). Endorses continued SOB, intermittent dry cough, weakness, and watery diarrhea. Wants soda although explained NPO at the moment. Yesterday was anemic with Hb at 6.7, but repeat labs showed 7.2, per heme/onc rec, gave 1 U pRBC overnight.    CONSTITUTIONAL: +weakness,+ fevers   EYES/ENT: No visual changes;  No vertigo or throat pain   NECK: No pain or stiffness  RESPIRATORY: +cough, wheezing, hemoptysis; +shortness of breath  CARDIOVASCULAR: No chest pain or palpitations  GASTROINTESTINAL: No abdominal or epigastric pain. No nausea, vomiting, or hematemesis; +diarrhea no constipation. No melena or hematochezia.  GENITOURINARY: No dysuria, frequency or hematuria  NEUROLOGICAL: No numbness or weakness  SKIN: No itching, rashes    OBJECTIVE:    VITAL SIGNS:  ICU Vital Signs Last 24 Hrs  T(C): 37.6 (09 Feb 2019 05:50), Max: 39.3 (08 Feb 2019 16:40)  T(F): 99.7 (09 Feb 2019 05:50), Max: 102.8 (08 Feb 2019 16:40)  HR: 110 (09 Feb 2019 05:50) (77 - 117)  BP: 112/61 (09 Feb 2019 05:50) (112/61 - 154/78)  BP(mean): --  ABP: --  ABP(mean): --  RR: 25 (09 Feb 2019 05:50) (22 - 37)  SpO2: 100% (09 Feb 2019 05:50) (96% - 100%)        02-08 @ 07:01  -  02-09 @ 07:00  --------------------------------------------------------  IN: 0 mL / OUT: 1825 mL / NET: -1825 mL      CAPILLARY BLOOD GLUCOSE  175 (07 Feb 2019 18:02)      POCT Blood Glucose.: 173 mg/dL (08 Feb 2019 21:18)      PHYSICAL EXAM:    General: NAD  HEENT: NC/AT; PERRL, clear conjunctiva  Neck: supple  Respiratory: Increased respiratory effort without substernal/intercostal retractions, On BiPAP, lungs clear to ausc b/l,  Rt sided port nonerythematous   Cardiovascular: +S1/S2; RRR  Abdomen: soft, NT/ND; +BS x4  Extremities: WWP, 2+ peripheral pulses b/l; no LE edema, lft arm IV access  Skin: normal color and turgor; no rash  Neurological:    MEDICATIONS:  MEDICATIONS  (STANDING):  azithromycin  IVPB      azithromycin  IVPB 500 milliGRAM(s) IV Intermittent every 24 hours  chlorhexidine 4% Liquid 1 Application(s) Topical two times a day  dextrose 5%. 1000 milliLiter(s) (50 mL/Hr) IV Continuous <Continuous>  dextrose 50% Injectable 12.5 Gram(s) IV Push once  dextrose 50% Injectable 25 Gram(s) IV Push once  dextrose 50% Injectable 25 Gram(s) IV Push once  heparin  Injectable 5000 Unit(s) SubCutaneous every 8 hours  insulin lispro (HumaLOG) corrective regimen sliding scale   SubCutaneous three times a day before meals  insulin lispro (HumaLOG) corrective regimen sliding scale   SubCutaneous at bedtime  piperacillin/tazobactam IVPB. 3.375 Gram(s) IV Intermittent every 8 hours    MEDICATIONS  (PRN):  acetaminophen   Tablet .. 650 milliGRAM(s) Oral every 6 hours PRN Mild Pain (1 - 3)  dextrose 40% Gel 15 Gram(s) Oral once PRN Blood Glucose LESS THAN 70 milliGRAM(s)/deciliter  glucagon  Injectable 1 milliGRAM(s) IntraMuscular once PRN Glucose LESS THAN 70 milligrams/deciliter      LABS:                        7.2    9.03  )-----------( 118      ( 08 Feb 2019 09:35 )             20.9     02-08    141  |  106  |  29<H>  ----------------------------<  138<H>  2.9<LL>   |  17<L>  |  1.62<H>    Ca    8.9      08 Feb 2019 07:00  Phos  3.6     02-08  Mg     2.4     02-08    TPro  6.4  /  Alb  2.9<L>  /  TBili  0.6  /  DBili  x   /  AST  48<H>  /  ALT  31  /  AlkPhos  70  02-08    PT/INR - ( 08 Feb 2019 07:49 )   PT: 17.7 SEC;   INR: 1.57          PTT - ( 08 Feb 2019 07:49 )  PTT:41.4 SEC Chief compliant: syncope 2/2 neutropenic sepsis 2/2 legionella + PNA    SUBJECTIVE: Patient seen and examined at bedside. Temperature has remained on the high side of normal (mostly 99s) and HR intermittently tachy. Continues to be tachypneic on BiPAP (RR in mid/high 20s). Endorses continued SOB, intermittent dry cough, weakness, and watery diarrhea. Wants soda although explained NPO at the moment. Yesterday was anemic with Hb at 6.7, but repeat labs showed 7.2, per heme/onc rec, gave 1 U pRBC overnight.    CONSTITUTIONAL: +weakness,+ fevers   EYES/ENT: No visual changes;  No vertigo or throat pain   NECK: No pain or stiffness  RESPIRATORY: +cough, wheezing, hemoptysis; +shortness of breath  CARDIOVASCULAR: No chest pain or palpitations  GASTROINTESTINAL: No abdominal or epigastric pain. No nausea, vomiting, or hematemesis; +diarrhea no constipation. No melena or hematochezia.  GENITOURINARY: No dysuria, frequency or hematuria  NEUROLOGICAL: No numbness or weakness  SKIN: No itching, rashes    OBJECTIVE:    VITAL SIGNS:  ICU Vital Signs Last 24 Hrs  T(C): 37.6 (09 Feb 2019 05:50), Max: 39.3 (08 Feb 2019 16:40)  T(F): 99.7 (09 Feb 2019 05:50), Max: 102.8 (08 Feb 2019 16:40)  HR: 110 (09 Feb 2019 05:50) (77 - 117)  BP: 112/61 (09 Feb 2019 05:50) (112/61 - 154/78)  BP(mean): --  ABP: --  ABP(mean): --  RR: 25 (09 Feb 2019 05:50) (22 - 37)  SpO2: 100% (09 Feb 2019 05:50) (96% - 100%)        02-08 @ 07:01  -  02-09 @ 07:00  --------------------------------------------------------  IN: 0 mL / OUT: 1825 mL / NET: -1825 mL      CAPILLARY BLOOD GLUCOSE  175 (07 Feb 2019 18:02)  POCT Blood Glucose.: 173 mg/dL (08 Feb 2019 21:18)      PHYSICAL EXAM:    General: NAD  HEENT: NC/AT; PERRL, clear conjunctiva  Neck: supple  Respiratory: Increased respiratory effort without substernal/intercostal retractions, On BiPAP, lungs clear to ausc b/l,  Rt sided port nonerythematous   Cardiovascular: +S1/S2; RRR  Abdomen: soft, NT/ND; +BS x4  Extremities: WWP, 2+ peripheral pulses b/l; no LE edema, lft arm IV access  Skin: normal color and turgor; no rash  Neurological:    MEDICATIONS:  MEDICATIONS  (STANDING):  azithromycin  IVPB      azithromycin  IVPB 500 milliGRAM(s) IV Intermittent every 24 hours  chlorhexidine 4% Liquid 1 Application(s) Topical two times a day  dextrose 5%. 1000 milliLiter(s) (50 mL/Hr) IV Continuous <Continuous>  dextrose 50% Injectable 12.5 Gram(s) IV Push once  dextrose 50% Injectable 25 Gram(s) IV Push once  dextrose 50% Injectable 25 Gram(s) IV Push once  heparin  Injectable 5000 Unit(s) SubCutaneous every 8 hours  insulin lispro (HumaLOG) corrective regimen sliding scale   SubCutaneous three times a day before meals  insulin lispro (HumaLOG) corrective regimen sliding scale   SubCutaneous at bedtime  piperacillin/tazobactam IVPB. 3.375 Gram(s) IV Intermittent every 8 hours    MEDICATIONS  (PRN):  acetaminophen   Tablet .. 650 milliGRAM(s) Oral every 6 hours PRN Mild Pain (1 - 3)  dextrose 40% Gel 15 Gram(s) Oral once PRN Blood Glucose LESS THAN 70 milliGRAM(s)/deciliter  glucagon  Injectable 1 milliGRAM(s) IntraMuscular once PRN Glucose LESS THAN 70 milligrams/deciliter      LABS:                                   9.2    15.29 )-----------( 136      ( 09 Feb 2019 07:21 )             27.4   02-09    140  |  106  |  41<H>  ----------------------------<  160<H>  3.6   |  14<L>  |  2.18<H>    Ca    9.7      09 Feb 2019 07:21  Phos  4.2     02-09  Mg     2.7     02-09    TPro  7.2  /  Alb  2.9<L>  /  TBili  0.5  /  DBili  x   /  AST  65<H>  /  ALT  33  /  AlkPhos  142<H>  02-09      PT/INR - ( 08 Feb 2019 07:49 )   PT: 17.7 SEC;   INR: 1.57          PTT - ( 08 Feb 2019 07:49 )  PTT:41.4 SEC Chief compliant: syncope 2/2 neutropenic sepsis 2/2 legionella + PNA    SUBJECTIVE: Patient seen and examined at bedside. Temperature has remained on the high side of normal (mostly 99s) and HR intermittently tachy. Continues to be tachypneic on BiPAP (RR in mid/high 20s). Endorses continued SOB, intermittent dry cough, weakness, and watery diarrhea. Wants soda although explained NPO at the moment. Yesterday was anemic with Hb at 6.7, but repeat labs showed 7.2, per heme/onc rec, gave 1 U pRBC overnight.    CONSTITUTIONAL: +weakness,+ fevers   EYES/ENT: No visual changes;  No vertigo or throat pain   NECK: No pain or stiffness  RESPIRATORY: +cough, wheezing, hemoptysis; +shortness of breath  CARDIOVASCULAR: No chest pain or palpitations  GASTROINTESTINAL: No abdominal or epigastric pain. No nausea, vomiting, or hematemesis; +diarrhea no constipation. No melena or hematochezia.  GENITOURINARY: No dysuria, frequency or hematuria  NEUROLOGICAL: No numbness or weakness  SKIN: No itching, rashes    OBJECTIVE:    VITAL SIGNS:  ICU Vital Signs Last 24 Hrs  T(C): 37.6 (09 Feb 2019 05:50), Max: 39.3 (08 Feb 2019 16:40)  T(F): 99.7 (09 Feb 2019 05:50), Max: 102.8 (08 Feb 2019 16:40)  HR: 110 (09 Feb 2019 05:50) (77 - 117)  BP: 112/61 (09 Feb 2019 05:50) (112/61 - 154/78)  BP(mean): --  ABP: --  ABP(mean): --  RR: 25 (09 Feb 2019 05:50) (22 - 37)  SpO2: 100% (09 Feb 2019 05:50) (96% - 100%)        02-08 @ 07:01  -  02-09 @ 07:00  --------------------------------------------------------  IN: 0 mL / OUT: 1825 mL / NET: -1825 mL      CAPILLARY BLOOD GLUCOSE  175 (07 Feb 2019 18:02)  POCT Blood Glucose.: 173 mg/dL (08 Feb 2019 21:18)      PHYSICAL EXAM:    General: NAD, on BIPAP  HEENT: NC/AT; PERRL, clear conjunctiva  Neck: supple  Respiratory: Increased respiratory effort without substernal/intercostal retractions, lungs clear to ausc b/l,  Rt sided port nonerythematous   Cardiovascular: +S1/S2; RRR  Abdomen: soft, NT/ND; +BS x4  Extremities: WWP, 2+ peripheral pulses b/l; no LE edema, lft arm IV access  Skin: normal color and turgor; no rash  Neurological: AOX3, moves all extremities     MEDICATIONS:  MEDICATIONS  (STANDING):  azithromycin  IVPB      azithromycin  IVPB 500 milliGRAM(s) IV Intermittent every 24 hours  chlorhexidine 4% Liquid 1 Application(s) Topical two times a day  dextrose 5%. 1000 milliLiter(s) (50 mL/Hr) IV Continuous <Continuous>  dextrose 50% Injectable 12.5 Gram(s) IV Push once  dextrose 50% Injectable 25 Gram(s) IV Push once  dextrose 50% Injectable 25 Gram(s) IV Push once  heparin  Injectable 5000 Unit(s) SubCutaneous every 8 hours  insulin lispro (HumaLOG) corrective regimen sliding scale   SubCutaneous three times a day before meals  insulin lispro (HumaLOG) corrective regimen sliding scale   SubCutaneous at bedtime  piperacillin/tazobactam IVPB. 3.375 Gram(s) IV Intermittent every 8 hours    MEDICATIONS  (PRN):  acetaminophen   Tablet .. 650 milliGRAM(s) Oral every 6 hours PRN Mild Pain (1 - 3)  dextrose 40% Gel 15 Gram(s) Oral once PRN Blood Glucose LESS THAN 70 milliGRAM(s)/deciliter  glucagon  Injectable 1 milliGRAM(s) IntraMuscular once PRN Glucose LESS THAN 70 milligrams/deciliter      LABS:                                   9.2    15.29 )-----------( 136      ( 09 Feb 2019 07:21 )             27.4   02-09    140  |  106  |  41<H>  ----------------------------<  160<H>  3.6   |  14<L>  |  2.18<H>    Ca    9.7      09 Feb 2019 07:21  Phos  4.2     02-09  Mg     2.7     02-09    TPro  7.2  /  Alb  2.9<L>  /  TBili  0.5  /  DBili  x   /  AST  65<H>  /  ALT  33  /  AlkPhos  142<H>  02-09      PT/INR - ( 08 Feb 2019 07:49 )   PT: 17.7 SEC;   INR: 1.57          PTT - ( 08 Feb 2019 07:49 )  PTT:41.4 SEC

## 2019-02-09 NOTE — PROGRESS NOTE ADULT - PROBLEM SELECTOR PLAN 7
-ISABELLA on CKD likely ATN in the setting of sepsis  -Cr at admission 2.35, with a baseline of ~1.3  -Nonchanging Cr (1.65)  -UA shoed FENa of 0.4%, likely indicating prerenal ISABELLA on the setting of CKD  -continue to monitor Cr. -ISABELLA on CKD likely ATN vs prerenal in the setting of sepsis  -Cr at admission 2.35, with a baseline of ~1.3  -Increased Cr from yesterday Cr (2.18<-1.62)  -UA shoed FENa of 0.4%, likely indicating prerenal ISABELLA on the setting of CKD\  -Continue IVF  -continue to monitor Cr. -ISABELLA on CKD likely ATN in the setting of sepsis and now contrast exposure   -Cr at admission 2.35, with a baseline of ~1.3  -Increased Cr from yesterday Cr (2.18<-1.62), likely contrast induced   -Continue IVF  -continue to monitor Cr.

## 2019-02-10 LAB
ALBUMIN SERPL ELPH-MCNC: 2.7 G/DL — LOW (ref 3.3–5)
ALP SERPL-CCNC: 132 U/L — HIGH (ref 40–120)
ALT FLD-CCNC: 41 U/L — HIGH (ref 4–33)
ANION GAP SERPL CALC-SCNC: 21 MMO/L — HIGH (ref 7–14)
APPEARANCE UR: SIGNIFICANT CHANGE UP
APTT BLD: 37.9 SEC — HIGH (ref 27.5–36.3)
AST SERPL-CCNC: 83 U/L — HIGH (ref 4–32)
BACTERIA # UR AUTO: NEGATIVE — SIGNIFICANT CHANGE UP
BACTERIA BLD CULT: SIGNIFICANT CHANGE UP
BACTERIA BLD CULT: SIGNIFICANT CHANGE UP
BASE EXCESS BLDA CALC-SCNC: -13 MMOL/L — SIGNIFICANT CHANGE UP
BASOPHILS # BLD AUTO: 0.03 K/UL — SIGNIFICANT CHANGE UP (ref 0–0.2)
BASOPHILS NFR BLD AUTO: 0.2 % — SIGNIFICANT CHANGE UP (ref 0–2)
BILIRUB SERPL-MCNC: 0.4 MG/DL — SIGNIFICANT CHANGE UP (ref 0.2–1.2)
BILIRUB UR-MCNC: NEGATIVE — SIGNIFICANT CHANGE UP
BLOOD UR QL VISUAL: HIGH
BUN SERPL-MCNC: 59 MG/DL — HIGH (ref 7–23)
CALCIUM SERPL-MCNC: 9.8 MG/DL — SIGNIFICANT CHANGE UP (ref 8.4–10.5)
CHLORIDE BLDA-SCNC: 118 MMOL/L — HIGH (ref 96–108)
CHLORIDE SERPL-SCNC: 108 MMOL/L — HIGH (ref 98–107)
CHLORIDE UR-SCNC: 78 MMOL/L — SIGNIFICANT CHANGE UP
CO2 SERPL-SCNC: 11 MMOL/L — LOW (ref 22–31)
COLOR SPEC: YELLOW — SIGNIFICANT CHANGE UP
CREAT ?TM UR-MCNC: 55.9 MG/DL — SIGNIFICANT CHANGE UP
CREAT SERPL-MCNC: 3.79 MG/DL — HIGH (ref 0.5–1.3)
EOSINOPHIL # BLD AUTO: 0.01 K/UL — SIGNIFICANT CHANGE UP (ref 0–0.5)
EOSINOPHIL NFR BLD AUTO: 0.1 % — SIGNIFICANT CHANGE UP (ref 0–6)
GLUCOSE BLDA-MCNC: 191 MG/DL — HIGH (ref 70–99)
GLUCOSE BLDC GLUCOMTR-MCNC: 175 MG/DL — HIGH (ref 70–99)
GLUCOSE BLDC GLUCOMTR-MCNC: 176 MG/DL — HIGH (ref 70–99)
GLUCOSE BLDC GLUCOMTR-MCNC: 182 MG/DL — HIGH (ref 70–99)
GLUCOSE BLDC GLUCOMTR-MCNC: 204 MG/DL — HIGH (ref 70–99)
GLUCOSE SERPL-MCNC: 186 MG/DL — HIGH (ref 70–99)
GLUCOSE UR-MCNC: NEGATIVE — SIGNIFICANT CHANGE UP
HCO3 BLDA-SCNC: 14 MMOL/L — LOW (ref 22–26)
HCT VFR BLD CALC: 25.2 % — LOW (ref 34.5–45)
HCT VFR BLDA CALC: 25.3 % — LOW (ref 34.5–46.5)
HGB BLD-MCNC: 8.4 G/DL — LOW (ref 11.5–15.5)
HGB BLDA-MCNC: 8.1 G/DL — LOW (ref 11.5–15.5)
HYALINE CASTS # UR AUTO: HIGH
IMM GRANULOCYTES NFR BLD AUTO: 4.9 % — HIGH (ref 0–1.5)
INR BLD: 1.39 — HIGH (ref 0.88–1.17)
KETONES UR-MCNC: NEGATIVE — SIGNIFICANT CHANGE UP
LACTATE BLDA-SCNC: 0.9 MMOL/L — SIGNIFICANT CHANGE UP (ref 0.5–2)
LEUKOCYTE ESTERASE UR-ACNC: NEGATIVE — SIGNIFICANT CHANGE UP
LYMPHOCYTES # BLD AUTO: 0.75 K/UL — LOW (ref 1–3.3)
LYMPHOCYTES # BLD AUTO: 4.3 % — LOW (ref 13–44)
MAGNESIUM SERPL-MCNC: 3.1 MG/DL — HIGH (ref 1.6–2.6)
MCHC RBC-ENTMCNC: 28.8 PG — SIGNIFICANT CHANGE UP (ref 27–34)
MCHC RBC-ENTMCNC: 33.3 % — SIGNIFICANT CHANGE UP (ref 32–36)
MCV RBC AUTO: 86.3 FL — SIGNIFICANT CHANGE UP (ref 80–100)
MONOCYTES # BLD AUTO: 0.64 K/UL — SIGNIFICANT CHANGE UP (ref 0–0.9)
MONOCYTES NFR BLD AUTO: 3.7 % — SIGNIFICANT CHANGE UP (ref 2–14)
NEUTROPHILS # BLD AUTO: 15.12 K/UL — HIGH (ref 1.8–7.4)
NEUTROPHILS NFR BLD AUTO: 86.8 % — HIGH (ref 43–77)
NITRITE UR-MCNC: NEGATIVE — SIGNIFICANT CHANGE UP
NRBC # FLD: 0.05 K/UL — LOW (ref 25–125)
PCO2 BLDA: 24 MMHG — LOW (ref 32–48)
PH BLDA: 7.32 PH — LOW (ref 7.35–7.45)
PH UR: 6 — SIGNIFICANT CHANGE UP (ref 5–8)
PHOSPHATE SERPL-MCNC: 5.2 MG/DL — HIGH (ref 2.5–4.5)
PLATELET # BLD AUTO: 149 K/UL — LOW (ref 150–400)
PMV BLD: 11.1 FL — SIGNIFICANT CHANGE UP (ref 7–13)
PO2 BLDA: 161 MMHG — HIGH (ref 83–108)
POTASSIUM BLDA-SCNC: 3 MMOL/L — LOW (ref 3.4–4.5)
POTASSIUM SERPL-MCNC: 3.2 MMOL/L — LOW (ref 3.5–5.3)
POTASSIUM SERPL-SCNC: 3.2 MMOL/L — LOW (ref 3.5–5.3)
POTASSIUM UR-SCNC: 16.1 MMOL/L — SIGNIFICANT CHANGE UP
PROT SERPL-MCNC: 6.6 G/DL — SIGNIFICANT CHANGE UP (ref 6–8.3)
PROT UR-MCNC: 50 — SIGNIFICANT CHANGE UP
PROT UR-MCNC: 50.8 MG/DL — SIGNIFICANT CHANGE UP
PROTHROM AB SERPL-ACNC: 15.6 SEC — HIGH (ref 9.8–13.1)
RBC # BLD: 2.92 M/UL — LOW (ref 3.8–5.2)
RBC # FLD: 19.2 % — HIGH (ref 10.3–14.5)
RBC CASTS # UR COMP ASSIST: >50 — HIGH (ref 0–?)
SAO2 % BLDA: 98.9 % — SIGNIFICANT CHANGE UP (ref 95–99)
SODIUM BLDA-SCNC: 140 MMOL/L — SIGNIFICANT CHANGE UP (ref 136–146)
SODIUM SERPL-SCNC: 140 MMOL/L — SIGNIFICANT CHANGE UP (ref 135–145)
SODIUM UR-SCNC: 80 MMOL/L — SIGNIFICANT CHANGE UP
SP GR SPEC: 1.01 — SIGNIFICANT CHANGE UP (ref 1–1.04)
SQUAMOUS # UR AUTO: SIGNIFICANT CHANGE UP
UROBILINOGEN FLD QL: NORMAL — SIGNIFICANT CHANGE UP
WBC # BLD: 17.41 K/UL — HIGH (ref 3.8–10.5)
WBC # FLD AUTO: 17.41 K/UL — HIGH (ref 3.8–10.5)
WBC UR QL: SIGNIFICANT CHANGE UP (ref 0–?)

## 2019-02-10 PROCEDURE — 99232 SBSQ HOSP IP/OBS MODERATE 35: CPT | Mod: GC

## 2019-02-10 PROCEDURE — 99233 SBSQ HOSP IP/OBS HIGH 50: CPT | Mod: GC

## 2019-02-10 RX ORDER — PIPERACILLIN AND TAZOBACTAM 4; .5 G/20ML; G/20ML
3.38 INJECTION, POWDER, LYOPHILIZED, FOR SOLUTION INTRAVENOUS EVERY 12 HOURS
Qty: 0 | Refills: 0 | Status: DISCONTINUED | OUTPATIENT
Start: 2019-02-10 | End: 2019-02-10

## 2019-02-10 RX ORDER — SODIUM CHLORIDE 9 MG/ML
1000 INJECTION, SOLUTION INTRAVENOUS ONCE
Qty: 0 | Refills: 0 | Status: DISCONTINUED | OUTPATIENT
Start: 2019-02-10 | End: 2019-02-10

## 2019-02-10 RX ORDER — INSULIN LISPRO 100/ML
VIAL (ML) SUBCUTANEOUS EVERY 6 HOURS
Qty: 0 | Refills: 0 | Status: DISCONTINUED | OUTPATIENT
Start: 2019-02-10 | End: 2019-02-10

## 2019-02-10 RX ORDER — INSULIN LISPRO 100/ML
VIAL (ML) SUBCUTANEOUS
Qty: 0 | Refills: 0 | Status: DISCONTINUED | OUTPATIENT
Start: 2019-02-10 | End: 2019-02-20

## 2019-02-10 RX ORDER — SODIUM BICARBONATE 1 MEQ/ML
650 SYRINGE (ML) INTRAVENOUS THREE TIMES A DAY
Qty: 0 | Refills: 0 | Status: DISCONTINUED | OUTPATIENT
Start: 2019-02-10 | End: 2019-02-19

## 2019-02-10 RX ORDER — SODIUM CHLORIDE 9 MG/ML
1000 INJECTION, SOLUTION INTRAVENOUS
Qty: 0 | Refills: 0 | Status: DISCONTINUED | OUTPATIENT
Start: 2019-02-10 | End: 2019-02-11

## 2019-02-10 RX ADMIN — CHLORHEXIDINE GLUCONATE 1 APPLICATION(S): 213 SOLUTION TOPICAL at 17:14

## 2019-02-10 RX ADMIN — Medication 650 MILLIGRAM(S): at 23:04

## 2019-02-10 RX ADMIN — Medication 2: at 17:14

## 2019-02-10 RX ADMIN — SODIUM CHLORIDE 100 MILLILITER(S): 9 INJECTION, SOLUTION INTRAVENOUS at 08:53

## 2019-02-10 RX ADMIN — HEPARIN SODIUM 5000 UNIT(S): 5000 INJECTION INTRAVENOUS; SUBCUTANEOUS at 22:00

## 2019-02-10 RX ADMIN — HEPARIN SODIUM 5000 UNIT(S): 5000 INJECTION INTRAVENOUS; SUBCUTANEOUS at 14:01

## 2019-02-10 RX ADMIN — HEPARIN SODIUM 5000 UNIT(S): 5000 INJECTION INTRAVENOUS; SUBCUTANEOUS at 06:07

## 2019-02-10 RX ADMIN — SODIUM CHLORIDE 100 MILLILITER(S): 9 INJECTION, SOLUTION INTRAVENOUS at 06:06

## 2019-02-10 RX ADMIN — CHLORHEXIDINE GLUCONATE 1 APPLICATION(S): 213 SOLUTION TOPICAL at 06:07

## 2019-02-10 RX ADMIN — Medication 1: at 12:10

## 2019-02-10 RX ADMIN — Medication 650 MILLIGRAM(S): at 14:02

## 2019-02-10 RX ADMIN — Medication 1: at 06:07

## 2019-02-10 RX ADMIN — SODIUM CHLORIDE 100 MILLILITER(S): 9 INJECTION, SOLUTION INTRAVENOUS at 04:09

## 2019-02-10 RX ADMIN — AZITHROMYCIN 250 MILLIGRAM(S): 500 TABLET, FILM COATED ORAL at 18:00

## 2019-02-10 NOTE — PROGRESS NOTE ADULT - SUBJECTIVE AND OBJECTIVE BOX
OVERNIGHT EVENTS / SUBJECTIVE: Patient seen and examined at bedside.     OBJECTIVE:    VITAL SIGNS:  ICU Vital Signs Last 24 Hrs  T(C): 37.2 (10 Feb 2019 04:00), Max: 39.3 (09 Feb 2019 20:24)  T(F): 99 (10 Feb 2019 04:00), Max: 102.8 (09 Feb 2019 20:24)  HR: 68 (10 Feb 2019 07:00) (68 - 113)  BP: 132/63 (10 Feb 2019 07:00) (106/47 - 147/61)  BP(mean): 84 (10 Feb 2019 07:00) (62 - 93)  ABP: --  ABP(mean): --  RR: 19 (10 Feb 2019 07:00) (19 - 36)  SpO2: 100% (10 Feb 2019 07:00) (97% - 100%)        02-09 @ 07:01  -  02-10 @ 07:00  --------------------------------------------------------  IN: 1500 mL / OUT: 1800 mL / NET: -300 mL      CAPILLARY BLOOD GLUCOSE      POCT Blood Glucose.: 175 mg/dL (10 Feb 2019 05:53)      PHYSICAL EXAM:    General: NAD  HEENT: NC/AT; PERRL, clear conjunctiva  Neck: supple  Respiratory: CTA b/l  Cardiovascular: +S1/S2; RRR  Abdomen: soft, NT/ND; +BS x4  Extremities: WWP, 2+ peripheral pulses b/l; no LE edema  Skin: normal color and turgor; no rash  Neurological:    MEDICATIONS:  MEDICATIONS  (STANDING):  azithromycin  IVPB 500 milliGRAM(s) IV Intermittent every 24 hours  azithromycin  IVPB      chlorhexidine 4% Liquid 1 Application(s) Topical two times a day  dextrose 5%. 1000 milliLiter(s) (50 mL/Hr) IV Continuous <Continuous>  dextrose 50% Injectable 12.5 Gram(s) IV Push once  dextrose 50% Injectable 25 Gram(s) IV Push once  dextrose 50% Injectable 25 Gram(s) IV Push once  heparin  Injectable 5000 Unit(s) SubCutaneous every 8 hours  insulin lispro (HumaLOG) corrective regimen sliding scale   SubCutaneous at bedtime  insulin lispro (HumaLOG) corrective regimen sliding scale   SubCutaneous every 6 hours  lactated ringers. 1000 milliLiter(s) (100 mL/Hr) IV Continuous <Continuous>  piperacillin/tazobactam IVPB. 3.375 Gram(s) IV Intermittent every 12 hours    MEDICATIONS  (PRN):  acetaminophen   Tablet .. 650 milliGRAM(s) Oral every 6 hours PRN Mild Pain (1 - 3)  dextrose 40% Gel 15 Gram(s) Oral once PRN Blood Glucose LESS THAN 70 milliGRAM(s)/deciliter  glucagon  Injectable 1 milliGRAM(s) IntraMuscular once PRN Glucose LESS THAN 70 milligrams/deciliter      ALLERGIES:  Allergies    No Known Allergies    Intolerances    codeine (Nausea)      LABS:                        8.4    17.41 )-----------( 149      ( 10 Feb 2019 03:10 )             25.2     Hemoglobin: 8.4 g/dL (02-10 @ 03:10)  Hemoglobin: 9.2 g/dL (02-09 @ 07:21)  Hemoglobin: 7.2 g/dL (02-08 @ 09:35)  Hemoglobin: 6.7 g/dL (02-08 @ 07:00)  Hemoglobin: 7.6 g/dL (02-07 @ 05:03)    CBC Full  -  ( 10 Feb 2019 03:10 )  WBC Count : 17.41 K/uL  Hemoglobin : 8.4 g/dL  Hematocrit : 25.2 %  Platelet Count - Automated : 149 K/uL  Mean Cell Volume : 86.3 fL  Mean Cell Hemoglobin : 28.8 pg  Mean Cell Hemoglobin Concentration : 33.3 %  Auto Neutrophil # : 15.12 K/uL  Auto Lymphocyte # : 0.75 K/uL  Auto Monocyte # : 0.64 K/uL  Auto Eosinophil # : 0.01 K/uL  Auto Basophil # : 0.03 K/uL  Auto Neutrophil % : 86.8 %  Auto Lymphocyte % : 4.3 %  Auto Monocyte % : 3.7 %  Auto Eosinophil % : 0.1 %  Auto Basophil % : 0.2 %    02-10    140  |  108<H>  |  59<H>  ----------------------------<  186<H>  3.2<L>   |  11<L>  |  3.79<H>    Ca    9.8      10 Feb 2019 03:10  Phos  5.2     02-10  Mg     3.1     02-10    TPro  6.6  /  Alb  2.7<L>  /  TBili  0.4  /  DBili  x   /  AST  83<H>  /  ALT  41<H>  /  AlkPhos  132<H>  02-10    Creatinine Trend: 3.79<--, 2.18<--, 1.62<--, 1.65<--, 1.73<--, 1.96<--  LIVER FUNCTIONS - ( 10 Feb 2019 03:10 )  Alb: 2.7 g/dL / Pro: 6.6 g/dL / ALK PHOS: 132 u/L / ALT: 41 u/L / AST: 83 u/L / GGT: x           PT/INR - ( 10 Feb 2019 05:40 )   PT: 15.6 SEC;   INR: 1.39          PTT - ( 10 Feb 2019 05:40 )  PTT:37.9 SEC      ABG - ( 10 Feb 2019 03:10 )  pH, Arterial: 7.32  pH, Blood: x     /  pCO2: 24    /  pO2: 161   / HCO3: 14    / Base Excess: -13.0 /  SaO2: 98.9                03:10 - ABG - pH: 7.32  |  pCO2: 24    |  pO2: 161   | Lactate: 0.9   | BE: -13.0  18:22 - ABG - pH: 7.41  |  pCO2: 21    |  pO2: 107   | Lactate: 1.0   | BE: -10.5          EKG:   MICROBIOLOGY:    Culture - Blood (collected 08 Feb 2019 21:47)  Source: BLOOD PERIPHERAL  Preliminary Report (09 Feb 2019 21:45):    NO ORGANISMS ISOLATED    NO ORGANISMS ISOLATED AT 24 HOURS    Culture - Blood (collected 08 Feb 2019 21:47)  Source: BLOOD VENOUS  Preliminary Report (09 Feb 2019 21:45):    NO ORGANISMS ISOLATED    NO ORGANISMS ISOLATED AT 24 HOURS      IMAGING:    RADIOLOGY & ADDITIONAL TESTS: Reviewed. OVERNIGHT EVENTS / SUBJECTIVE: Patient seen and examined at bedside.     OBJECTIVE: No acute events overnight. Pt reports mild SOB, denies any other complaints.    VITAL SIGNS:  ICU Vital Signs Last 24 Hrs  T(C): 37.2 (10 Feb 2019 04:00), Max: 39.3 (09 Feb 2019 20:24)  T(F): 99 (10 Feb 2019 04:00), Max: 102.8 (09 Feb 2019 20:24)  HR: 68 (10 Feb 2019 07:00) (68 - 113)  BP: 132/63 (10 Feb 2019 07:00) (106/47 - 147/61)  BP(mean): 84 (10 Feb 2019 07:00) (62 - 93)  ABP: --  ABP(mean): --  RR: 19 (10 Feb 2019 07:00) (19 - 36)  SpO2: 100% (10 Feb 2019 07:00) (97% - 100%)        02-09 @ 07:01  -  02-10 @ 07:00  --------------------------------------------------------  IN: 1500 mL / OUT: 1800 mL / NET: -300 mL      CAPILLARY BLOOD GLUCOSE      POCT Blood Glucose.: 175 mg/dL (10 Feb 2019 05:53)      PHYSICAL EXAM:    General: NAD  HEENT: NC/AT; PERRL, clear conjunctiva  Neck: supple  Respiratory: RUL crackles, otherwise CTA  Cardiovascular: +S1/S2; RRR  Abdomen: soft, NT/ND; +BS x4  Extremities: 1+ pitting edema b/l LE, WWP, 2+ peripheral pulses b/l  Skin: normal color and turgor; no rash  Neurological: A&Ox4, no focal neuro deficits    MEDICATIONS:  MEDICATIONS  (STANDING):  azithromycin  IVPB 500 milliGRAM(s) IV Intermittent every 24 hours  azithromycin  IVPB      chlorhexidine 4% Liquid 1 Application(s) Topical two times a day  dextrose 5%. 1000 milliLiter(s) (50 mL/Hr) IV Continuous <Continuous>  dextrose 50% Injectable 12.5 Gram(s) IV Push once  dextrose 50% Injectable 25 Gram(s) IV Push once  dextrose 50% Injectable 25 Gram(s) IV Push once  heparin  Injectable 5000 Unit(s) SubCutaneous every 8 hours  insulin lispro (HumaLOG) corrective regimen sliding scale   SubCutaneous at bedtime  insulin lispro (HumaLOG) corrective regimen sliding scale   SubCutaneous every 6 hours  lactated ringers. 1000 milliLiter(s) (100 mL/Hr) IV Continuous <Continuous>  piperacillin/tazobactam IVPB. 3.375 Gram(s) IV Intermittent every 12 hours    MEDICATIONS  (PRN):  acetaminophen   Tablet .. 650 milliGRAM(s) Oral every 6 hours PRN Mild Pain (1 - 3)  dextrose 40% Gel 15 Gram(s) Oral once PRN Blood Glucose LESS THAN 70 milliGRAM(s)/deciliter  glucagon  Injectable 1 milliGRAM(s) IntraMuscular once PRN Glucose LESS THAN 70 milligrams/deciliter      ALLERGIES:  Allergies    No Known Allergies    Intolerances    codeine (Nausea)      LABS:                        8.4    17.41 )-----------( 149      ( 10 Feb 2019 03:10 )             25.2     Hemoglobin: 8.4 g/dL (02-10 @ 03:10)  Hemoglobin: 9.2 g/dL (02-09 @ 07:21)  Hemoglobin: 7.2 g/dL (02-08 @ 09:35)  Hemoglobin: 6.7 g/dL (02-08 @ 07:00)  Hemoglobin: 7.6 g/dL (02-07 @ 05:03)    CBC Full  -  ( 10 Feb 2019 03:10 )  WBC Count : 17.41 K/uL  Hemoglobin : 8.4 g/dL  Hematocrit : 25.2 %  Platelet Count - Automated : 149 K/uL  Mean Cell Volume : 86.3 fL  Mean Cell Hemoglobin : 28.8 pg  Mean Cell Hemoglobin Concentration : 33.3 %  Auto Neutrophil # : 15.12 K/uL  Auto Lymphocyte # : 0.75 K/uL  Auto Monocyte # : 0.64 K/uL  Auto Eosinophil # : 0.01 K/uL  Auto Basophil # : 0.03 K/uL  Auto Neutrophil % : 86.8 %  Auto Lymphocyte % : 4.3 %  Auto Monocyte % : 3.7 %  Auto Eosinophil % : 0.1 %  Auto Basophil % : 0.2 %    02-10    140  |  108<H>  |  59<H>  ----------------------------<  186<H>  3.2<L>   |  11<L>  |  3.79<H>    Ca    9.8      10 Feb 2019 03:10  Phos  5.2     02-10  Mg     3.1     02-10    TPro  6.6  /  Alb  2.7<L>  /  TBili  0.4  /  DBili  x   /  AST  83<H>  /  ALT  41<H>  /  AlkPhos  132<H>  02-10    Creatinine Trend: 3.79<--, 2.18<--, 1.62<--, 1.65<--, 1.73<--, 1.96<--  LIVER FUNCTIONS - ( 10 Feb 2019 03:10 )  Alb: 2.7 g/dL / Pro: 6.6 g/dL / ALK PHOS: 132 u/L / ALT: 41 u/L / AST: 83 u/L / GGT: x           PT/INR - ( 10 Feb 2019 05:40 )   PT: 15.6 SEC;   INR: 1.39          PTT - ( 10 Feb 2019 05:40 )  PTT:37.9 SEC      ABG - ( 10 Feb 2019 03:10 )  pH, Arterial: 7.32  pH, Blood: x     /  pCO2: 24    /  pO2: 161   / HCO3: 14    / Base Excess: -13.0 /  SaO2: 98.9                03:10 - ABG - pH: 7.32  |  pCO2: 24    |  pO2: 161   | Lactate: 0.9   | BE: -13.0  18:22 - ABG - pH: 7.41  |  pCO2: 21    |  pO2: 107   | Lactate: 1.0   | BE: -10.5          EKG:   MICROBIOLOGY:    Culture - Blood (collected 08 Feb 2019 21:47)  Source: BLOOD PERIPHERAL  Preliminary Report (09 Feb 2019 21:45):    NO ORGANISMS ISOLATED    NO ORGANISMS ISOLATED AT 24 HOURS    Culture - Blood (collected 08 Feb 2019 21:47)  Source: BLOOD VENOUS  Preliminary Report (09 Feb 2019 21:45):    NO ORGANISMS ISOLATED    NO ORGANISMS ISOLATED AT 24 HOURS      IMAGING:    RADIOLOGY & ADDITIONAL TESTS: Reviewed.

## 2019-02-10 NOTE — CONSULT NOTE ADULT - ATTENDING COMMENTS
Patient examined and ROS reviewed. A case of ISABELLA in the setting PNA and chemotherapy for breast CA. Patient is non-oliguiric. Advised fluid balance and monitor electrolytes.

## 2019-02-10 NOTE — CONSULT NOTE ADULT - SUBJECTIVE AND OBJECTIVE BOX
[facility] DIVISION OF KIDNEY DISEASES AND HYPERTENSION -- INITIAL CONSULT NOTE  --------------------------------------------------------------------------------  HPI: 75yo F with PMH of breast CA (dx with malignant neoplasm of right female breast and intraductal carcinoma in situ of left breast) on chemo (last treatment 8 days ago), CKD, HTN, DMT2 (not on home insulin) admitted for syncopal episode. Pt found to have legionella PNA requiring bipap. Nephrology consulted for ISABELLA. Pt with baseline SCr 1.2-1.6 admitted with elevated SCr to 2.35 (2/5/19). Scr however improved to 1.6 on 2/7/19. Pt had a CT with IV contrast on 2/7/19. Scr elevated to 3.7 today (2/10/19). Pt received two dose of Filgrastim during her stay (?GN). She also had one dose of ibruprofen. Pt admits to taking NSAIDs daily at home as well. Pt seen and examined. Currently on bipap.     PAST HISTORY  --------------------------------------------------------------------------------  PAST MEDICAL & SURGICAL HISTORY:  CKD (chronic kidney disease) stage 3, GFR 30-59 ml/min  Intraductal carcinoma in situ of left breast  Malignant neoplasm of right breast  Carpal tunnel syndrome on left  Ovarian cyst: right  Hypertension  Primary osteoarthritis of right knee  Primary osteoarthritis of left knee  Gout  Cataract: bilateral eyes  Diabetes mellitus, type 2  CKD (chronic kidney disease)  Spinal stenosis of lumbosacral region  S/P total knee arthroplasty, right: 9/12/18  S/P carpal tunnel release: left hand- 2007  Lipoma of chest wall: sx removal  Lipoma of neck: sx removal  History of right salpingo-oophorectomy: 1994  S/P appendectomy: 1994    FAMILY HISTORY:  No pertinent family history in first degree relatives    PAST SOCIAL HISTORY:    ALLERGIES & MEDICATIONS  --------------------------------------------------------------------------------  Allergies    No Known Allergies    Intolerances    codeine (Nausea)    Standing Inpatient Medications  azithromycin  IVPB 500 milliGRAM(s) IV Intermittent every 24 hours  azithromycin  IVPB      chlorhexidine 4% Liquid 1 Application(s) Topical two times a day  dextrose 5%. 1000 milliLiter(s) IV Continuous <Continuous>  dextrose 50% Injectable 12.5 Gram(s) IV Push once  dextrose 50% Injectable 25 Gram(s) IV Push once  dextrose 50% Injectable 25 Gram(s) IV Push once  heparin  Injectable 5000 Unit(s) SubCutaneous every 8 hours  insulin lispro (HumaLOG) corrective regimen sliding scale   SubCutaneous at bedtime  insulin lispro (HumaLOG) corrective regimen sliding scale   SubCutaneous every 6 hours  lactated ringers. 1000 milliLiter(s) IV Continuous <Continuous>  piperacillin/tazobactam IVPB. 3.375 Gram(s) IV Intermittent every 12 hours    PRN Inpatient Medications  acetaminophen   Tablet .. 650 milliGRAM(s) Oral every 6 hours PRN  dextrose 40% Gel 15 Gram(s) Oral once PRN  glucagon  Injectable 1 milliGRAM(s) IntraMuscular once PRN      REVIEW OF SYSTEMS  --------------------------------------------------------------------------------  Unable to obtain as pt is on bipap    VITALS/PHYSICAL EXAM  --------------------------------------------------------------------------------  T(C): 37.2 (02-10-19 @ 04:00), Max: 39.3 (02-09-19 @ 20:24)  HR: 68 (02-10-19 @ 08:00) (68 - 113)  BP: 131/61 (02-10-19 @ 08:00) (106/47 - 147/61)  RR: 22 (02-10-19 @ 08:00) (19 - 36)  SpO2: 100% (02-10-19 @ 08:00) (97% - 100%)  Wt(kg): --      02-09-19 @ 07:01  -  02-10-19 @ 07:00  --------------------------------------------------------  IN: 1500 mL / OUT: 1800 mL / NET: -300 mL      Physical Exam:  	Gen: NAD on bipap  	Pulm: CTA B/L  	CV: RRR, S1S2; no rub  	Abd: +BS, soft, nontender/nondistended  	: No suprapubic tenderness  	UE: Warm, no asterixis  	LE: Warm,  no edema  	Psych: Normal affect and mood  	Skin: Warm, without rashes    LABS/STUDIES  --------------------------------------------------------------------------------              8.4    17.41 >-----------<  149      [02-10-19 @ 03:10]              25.2     140  |  108  |  59  ----------------------------<  186      [02-10-19 @ 03:10]  3.2   |  11  |  3.79        Ca     9.8     [02-10-19 @ 03:10]      Mg     3.1     [02-10-19 @ 03:10]      Phos  5.2     [02-10-19 @ 03:10]    TPro  6.6  /  Alb  2.7  /  TBili  0.4  /  DBili  x   /  AST  83  /  ALT  41  /  AlkPhos  132  [02-10-19 @ 03:10]    PT/INR: PT 15.6 , INR 1.39       [02-10-19 @ 05:40]  PTT: 37.9       [02-10-19 @ 05:40]      Creatinine Trend:  SCr 3.79 [02-10 @ 03:10]  SCr 2.18 [02-09 @ 07:21]  SCr 1.62 [02-08 @ 07:00]  SCr 1.65 [02-07 @ 05:03]  SCr 1.73 [02-06 @ 16:06]    Urinalysis - [02-06-19 @ 14:50]      Color YELLOW / Appearance TURBID / SG 1.017 / pH 6.0      Gluc NEGATIVE / Ketone NEGATIVE  / Bili NEGATIVE / Urobili NORMAL       Blood SMALL / Protein 100 / Leuk Est NEGATIVE / Nitrite NEGATIVE      RBC 1-3 / WBC 2-5 / Hyaline  / Gran  / Sq Epi  / Non Sq Epi SMALL / Bacteria SMALL    Urine Creatinine 113.20      [02-06-19 @ 14:50]  Urine Sodium 38      [02-06-19 @ 14:50]  Urine Urea Nitrogen 421.3      [02-06-19 @ 14:50]  Urine Potassium 44.3      [02-06-19 @ 14:50]  Urine Osmolality 342      [02-06-19 @ 14:50]    Iron 11, TIBC 133, %sat --      [02-06-19 @ 06:01]  Ferritin 1928      [02-06-19 @ 06:01]  HbA1c 7.6      [02-05-19 @ 21:25]

## 2019-02-10 NOTE — CONSULT NOTE ADULT - PROBLEM SELECTOR RECOMMENDATION 9
Pt with baseline SCr 1.2-1.6 admitted with elevated SCr to 2.35 (2/5/19). Scr however improved to 1.6 on 2/7/19. Pt had a CT with IV contrast on 2/7/19. Scr elevated to 3.7 today (2/10/19). Pt received two doses of Filgrastim during her stay (?GN). She also had one dose of ibruprofen. Pt with possible hemodynamic ISABELLA in the setting of IV contrast, PNA and NSAIDs. UA with some blood and protein on 2/6. Pt non-oliguric.   Advise to send repeat UA with urine electrolytes and TP/CR.  Avoid NSAIDs.

## 2019-02-10 NOTE — PROGRESS NOTE ADULT - ATTENDING COMMENTS
Patient re-admitted to MICU overnight for respiratory distress, increased work of breathing in setting of severe metabolic acidosis, and Legionella pneumonia. Did not require intubation, remained on bilevel, and now on room air and is comfortable. Worsening ISABELLA on CKD - possible contrast induced nephropathy? Check urine lytes. Start sodium bicarbonate tablets. Stable for transfer to floor.

## 2019-02-10 NOTE — PROGRESS NOTE ADULT - ASSESSMENT
73yo F with PMH of breast CA (dx with malignant neoplasm of right female breast and intraductal carcinoma in situ of left breast) on Cytoxan & Docetaxol , CKD, HTN, DMT2 admitted for neutropenic fever with sepsis 2/2 to RUL PNA with course c/b acute hypoxemic respiratory failure 2/2 to Legionella PNA.     #Neuro  -alert, responsive; continue to monitor mental status closely    #Respiratory   -ABG with increasing respiratory alkalosis on BiPap; however oxygenating appropriately  -will monitor respiratory status closely and intubate if indicated   -c/w abx for Legionella PNA    #CV  -sinus tachycardia likely 2/2 to sepsis & fevers  -antipyretics & maintenance IVF    #GI  -NPO while on BiPap    #ID  -c/w Azithromycin & Zosyn. Will give dose of Levaquin for synergistic effect   -BCx NGTD    #Heme/Onc  -resolution of neutropenia s/p neupogen  -Anemia likely 2/2 to pancytopenia in the setting of chemotherapy. Transfuse PRN to maintain Hgb >7.0    #Renal/Metabolic  -ISABELLA on CKD; monitor closely; avoid nephrotoxins & renally dose medications  -Respiratory alkalosis from tachypnea with concomitant metabolic acidosis from worsening renal function.     #Endocrine  -c/w ISS    #DVT ppx:   -HSQ     Patient Full Code 73yo F with PMH of breast CA (dx with malignant neoplasm of right female breast and intraductal carcinoma in situ of left breast) on Cytoxan & Docetaxol , CKD, HTN, DMT2 admitted for neutropenic fever with sepsis 2/2 to RUL PNA with course c/b acute hypoxemic respiratory failure 2/2 to Legionella PNA.     #Neuro  -A&Ox4  -no active issues    #Respiratory   - Not requiring BiPAP since this AM, good O2 sat on 4L NC, will continue to wean supplemental oxygen as tolerated  -ABG with metabolic acidosis with appropriate respiratory compensation  -will monitor respiratory status closely  -c/w abx for Legionella PNA    #CV  -hemodynamically stable  -no active issues    #GI  -Consistent carb renal diet  -No active issues    #ID  -c/w Azithromycin  -No longer neutropenic will d/c zosyn  -BCx NGTD    #Heme/Onc  -resolution of neutropenia s/p neupogen  -Anemia likely 2/2 to pancytopenia in the setting of chemotherapy. Transfuse PRN to maintain Hgb >7.0    #Renal/Metabolic  -ISABELLA on CKD; monitor closely; avoid nephrotoxins & renally dose medications  -Respiratory alkalosis from tachypnea with concomitant metabolic acidosis from worsening renal function.     #Endocrine  -c/w ISS    #DVT ppx:   -HSQ     Patient Full Code 75yo F with PMH of breast CA (dx with malignant neoplasm of right female breast and intraductal carcinoma in situ of left breast) on Cytoxan & Docetaxol , CKD, HTN, DMT2 admitted for neutropenic fever with sepsis 2/2 to RUL PNA with course c/b acute hypoxemic respiratory failure 2/2 to Legionella PNA.     #Neuro  -A&Ox4  -no active issues    #Respiratory   -Not requiring BiPAP since this AM, good O2 sat on 4L NC, will continue to wean supplemental oxygen as tolerated  -ABG with metabolic acidosis with appropriate respiratory compensation  -will monitor respiratory status closely  -c/w abx for Legionella PNA    #CV  -hemodynamically stable  -no active issues    #GI  -Consistent carb renal diet  -No active issues    #Renal/Metabolic  -ISABELLA on CKD, Cr still uptrending 3.79 (baseline ~1.3), contrast induced nephropathy vs prerenal vs drug induced (filgrastim)  -Will obtain urine studies  -Metabolic acidosis w/ respiratory compensation  -Will start bicarb 650mg TID  -monitor closely; avoid nephrotoxins & renally dose medications  - f/u nephro recs    #ID  -c/w Azithromycin  -No longer neutropenic will d/c zosyn  -BCx NGTD    #Heme/Onc  -resolution of neutropenia s/p neupogen  -Anemia likely 2/2 to pancytopenia in the setting of chemotherapy. Transfuse PRN to maintain Hgb >7.0    #Endocrine  -c/w ISS    #DVT ppx:   -HSQ     #HCM  - Out of bed to chair  - PT eval  - Patient Full Code

## 2019-02-11 LAB
ALBUMIN SERPL ELPH-MCNC: 2.8 G/DL — LOW (ref 3.3–5)
ALP SERPL-CCNC: 137 U/L — HIGH (ref 40–120)
ALT FLD-CCNC: 41 U/L — HIGH (ref 4–33)
ANION GAP SERPL CALC-SCNC: 18 MMO/L — HIGH (ref 7–14)
AST SERPL-CCNC: 73 U/L — HIGH (ref 4–32)
BASE EXCESS BLDV CALC-SCNC: -11.3 MMOL/L — SIGNIFICANT CHANGE UP
BASOPHILS # BLD AUTO: 0.03 K/UL — SIGNIFICANT CHANGE UP (ref 0–0.2)
BASOPHILS NFR BLD AUTO: 0.2 % — SIGNIFICANT CHANGE UP (ref 0–2)
BILIRUB SERPL-MCNC: 0.3 MG/DL — SIGNIFICANT CHANGE UP (ref 0.2–1.2)
BLOOD GAS VENOUS - CREATININE: SIGNIFICANT CHANGE UP MG/DL (ref 0.5–1.3)
BUN SERPL-MCNC: 74 MG/DL — HIGH (ref 7–23)
CALCIUM SERPL-MCNC: 9.6 MG/DL — SIGNIFICANT CHANGE UP (ref 8.4–10.5)
CHLORIDE BLDV-SCNC: 113 MMOL/L — HIGH (ref 96–108)
CHLORIDE SERPL-SCNC: 104 MMOL/L — SIGNIFICANT CHANGE UP (ref 98–107)
CO2 SERPL-SCNC: 12 MMOL/L — LOW (ref 22–31)
CREAT SERPL-MCNC: 4.1 MG/DL — HIGH (ref 0.5–1.3)
EOSINOPHIL # BLD AUTO: 0.01 K/UL — SIGNIFICANT CHANGE UP (ref 0–0.5)
EOSINOPHIL NFR BLD AUTO: 0.1 % — SIGNIFICANT CHANGE UP (ref 0–6)
GAS PNL BLDV: 136 MMOL/L — SIGNIFICANT CHANGE UP (ref 136–146)
GLUCOSE BLDC GLUCOMTR-MCNC: 159 MG/DL — HIGH (ref 70–99)
GLUCOSE BLDC GLUCOMTR-MCNC: 161 MG/DL — HIGH (ref 70–99)
GLUCOSE BLDC GLUCOMTR-MCNC: 164 MG/DL — HIGH (ref 70–99)
GLUCOSE BLDC GLUCOMTR-MCNC: 193 MG/DL — HIGH (ref 70–99)
GLUCOSE BLDV-MCNC: 161 — HIGH (ref 70–99)
GLUCOSE SERPL-MCNC: 154 MG/DL — HIGH (ref 70–99)
HCO3 BLDV-SCNC: 15 MMOL/L — LOW (ref 20–27)
HCT VFR BLD CALC: 24.7 % — LOW (ref 34.5–45)
HCT VFR BLDV CALC: 26.7 % — LOW (ref 34.5–45)
HGB BLD-MCNC: 8.1 G/DL — LOW (ref 11.5–15.5)
HGB BLDV-MCNC: 8.6 G/DL — LOW (ref 11.5–15.5)
IMM GRANULOCYTES NFR BLD AUTO: 4.5 % — HIGH (ref 0–1.5)
LACTATE BLDV-MCNC: 1 MMOL/L — SIGNIFICANT CHANGE UP (ref 0.5–2)
LYMPHOCYTES # BLD AUTO: 0.97 K/UL — LOW (ref 1–3.3)
LYMPHOCYTES # BLD AUTO: 6 % — LOW (ref 13–44)
MAGNESIUM SERPL-MCNC: 2.8 MG/DL — HIGH (ref 1.6–2.6)
MANUAL SMEAR VERIFICATION: SIGNIFICANT CHANGE UP
MCHC RBC-ENTMCNC: 28 PG — SIGNIFICANT CHANGE UP (ref 27–34)
MCHC RBC-ENTMCNC: 32.8 % — SIGNIFICANT CHANGE UP (ref 32–36)
MCV RBC AUTO: 85.5 FL — SIGNIFICANT CHANGE UP (ref 80–100)
MONOCYTES # BLD AUTO: 0.59 K/UL — SIGNIFICANT CHANGE UP (ref 0–0.9)
MONOCYTES NFR BLD AUTO: 3.7 % — SIGNIFICANT CHANGE UP (ref 2–14)
NEUTROPHILS # BLD AUTO: 13.81 K/UL — HIGH (ref 1.8–7.4)
NEUTROPHILS NFR BLD AUTO: 85.5 % — HIGH (ref 43–77)
NRBC # FLD: 0.04 K/UL — LOW (ref 25–125)
PCO2 BLDV: 30 MMHG — LOW (ref 41–51)
PH BLDV: 7.29 PH — LOW (ref 7.32–7.43)
PHOSPHATE SERPL-MCNC: 4.5 MG/DL — SIGNIFICANT CHANGE UP (ref 2.5–4.5)
PLATELET # BLD AUTO: 172 K/UL — SIGNIFICANT CHANGE UP (ref 150–400)
PMV BLD: 11.3 FL — SIGNIFICANT CHANGE UP (ref 7–13)
PO2 BLDV: 49 MMHG — HIGH (ref 35–40)
POTASSIUM BLDV-SCNC: 3 MMOL/L — LOW (ref 3.4–4.5)
POTASSIUM SERPL-MCNC: 3.1 MMOL/L — LOW (ref 3.5–5.3)
POTASSIUM SERPL-SCNC: 3.1 MMOL/L — LOW (ref 3.5–5.3)
PROT SERPL-MCNC: 6.6 G/DL — SIGNIFICANT CHANGE UP (ref 6–8.3)
RBC # BLD: 2.89 M/UL — LOW (ref 3.8–5.2)
RBC # FLD: 19.9 % — HIGH (ref 10.3–14.5)
SAO2 % BLDV: 76.3 % — SIGNIFICANT CHANGE UP (ref 60–85)
SODIUM SERPL-SCNC: 134 MMOL/L — LOW (ref 135–145)
WBC # BLD: 16.13 K/UL — HIGH (ref 3.8–10.5)
WBC # FLD AUTO: 16.13 K/UL — HIGH (ref 3.8–10.5)

## 2019-02-11 PROCEDURE — 99233 SBSQ HOSP IP/OBS HIGH 50: CPT | Mod: GC

## 2019-02-11 PROCEDURE — 99232 SBSQ HOSP IP/OBS MODERATE 35: CPT | Mod: GC

## 2019-02-11 RX ORDER — POTASSIUM CHLORIDE 20 MEQ
20 PACKET (EA) ORAL
Qty: 0 | Refills: 0 | Status: COMPLETED | OUTPATIENT
Start: 2019-02-11 | End: 2019-02-11

## 2019-02-11 RX ADMIN — HEPARIN SODIUM 5000 UNIT(S): 5000 INJECTION INTRAVENOUS; SUBCUTANEOUS at 05:57

## 2019-02-11 RX ADMIN — Medication 20 MILLIEQUIVALENT(S): at 10:03

## 2019-02-11 RX ADMIN — Medication 650 MILLIGRAM(S): at 22:18

## 2019-02-11 RX ADMIN — HEPARIN SODIUM 5000 UNIT(S): 5000 INJECTION INTRAVENOUS; SUBCUTANEOUS at 15:40

## 2019-02-11 RX ADMIN — HEPARIN SODIUM 5000 UNIT(S): 5000 INJECTION INTRAVENOUS; SUBCUTANEOUS at 22:18

## 2019-02-11 RX ADMIN — AZITHROMYCIN 250 MILLIGRAM(S): 500 TABLET, FILM COATED ORAL at 17:36

## 2019-02-11 RX ADMIN — Medication 20 MILLIEQUIVALENT(S): at 08:49

## 2019-02-11 RX ADMIN — Medication 650 MILLIGRAM(S): at 05:57

## 2019-02-11 RX ADMIN — Medication 1: at 08:49

## 2019-02-11 RX ADMIN — CHLORHEXIDINE GLUCONATE 1 APPLICATION(S): 213 SOLUTION TOPICAL at 05:57

## 2019-02-11 RX ADMIN — Medication 1: at 12:46

## 2019-02-11 RX ADMIN — Medication 20 MILLIEQUIVALENT(S): at 12:32

## 2019-02-11 RX ADMIN — Medication 1: at 18:18

## 2019-02-11 RX ADMIN — Medication 650 MILLIGRAM(S): at 15:40

## 2019-02-11 NOTE — DIETITIAN INITIAL EVALUATION ADULT. - PROBLEM SELECTOR PLAN 4
-syncope likely 2/2 orthostatic hypotension/hypovolemia in the setting of sepsis and diarrhea  -CT head neg  -EKG showed sinus tach  -BPs stable with SBP in 140s  -check, orthostatics  -check TTE  -tele monitor

## 2019-02-11 NOTE — DIETITIAN INITIAL EVALUATION ADULT. - OTHER INFO
Pt seen for critical care nutrition extended LOS.  Pt admitted with dx of neutropenia and acute respiratory failure with hypoxia.  Met w/ pt who provided nutrition hx.  Pt w/o food allergies, difficulties chewing/swallowing.  She denies modified diet PTA.  She endorses hx of wt loss of approximately 12lbs which she attributes to poor food intake/lack of appetite. She denies use of nutritional supplements but said she was taking "juices" to help with wt loss, but did not note any improvement in wt status.  Pt restarted on diet today after 3 days of NPO.  She continues to c/o lack of appetite.  Lunch tray at bedside, observed no intake from tray.  Spoke to MICU team regarding supplement-suggest change to Nepro from Ensure Enlive 2/2 type 2 DM and CKD.

## 2019-02-11 NOTE — PROGRESS NOTE ADULT - PROBLEM SELECTOR PLAN 1
Pt with baseline SCr 1.2-1.6 admitted with elevated SCr to 2.35 (2/5/19). Scr however improved to 1.6 on 2/7/19. Pt had a CT with IV contrast on 2/7/19. Scr elevated to 3.7 (2/10/19). Pt received two doses of Filgrastim during her stay (?GN). She also had one dose of ibruprofen. Pt with possible hemodynamic ISABELLA in the setting of IV contrast, PNA and NSAIDs. Scr elevated to 4.1 today. Pt with only 250cc urine output overnight? Advise measure urine output. Can trial lasix today.  UA with some blood and protein on 2/6. Pt non-oliguric.   Pt with 0.9g proteinuria on spot urine exam.   Avoid NSAIDs. Pt with baseline SCr 1.2-1.6 admitted with elevated SCr to 2.35 (2/5/19). Scr however improved to 1.6 on 2/7/19. Pt had a CT with IV contrast on 2/7/19. Scr elevated to 3.7 (2/10/19). Pt received two doses of Filgrastim during her stay (?GN). She also had one dose of ibruprofen. Pt with possible hemodynamic ISABELLA in the setting of IV contrast, PNA and NSAIDs. Scr elevated to 4.1 today. Pt with only 250cc urine output overnight? Advise measure urine output. Can trial lasix today.  UA with some blood and protein on 2/6. Pt non-oliguric.   Pt with 0.9g proteinuria on spot urine exam.   Check C3, C4, EMELI, ANCA, anti-GBM, dsDNA, HBSAg, HCV Ab, SIFE, RPR, cryoglobulin levels.  Avoid NSAIDs.

## 2019-02-11 NOTE — DIETITIAN INITIAL EVALUATION ADULT. - PROBLEM SELECTOR PLAN 9
-Hx of malignant neoplasm of right female breast and intraductal carcinoma in situ of left breast) on chemo (last treatment 8 days ago)  -Will touch base with Dr. Ruiz from Onc

## 2019-02-11 NOTE — PHYSICAL THERAPY INITIAL EVALUATION ADULT - PLANNED THERAPY INTERVENTIONS, PT EVAL
transfer training/gait training/postural re-education/strengthening/balance training/bed mobility training

## 2019-02-11 NOTE — DIETITIAN INITIAL EVALUATION ADULT. - PROBLEM SELECTOR PLAN 5
-ISABELLA on CKD likely prerenal   -Cr of 2.35, with a baseline of ~1.3  -F/u with UA, urine electrolytes bladder scan  -IV hydration   -avoid nephrotoxic agents   -monitor Cr

## 2019-02-11 NOTE — PHYSICAL THERAPY INITIAL EVALUATION ADULT - DIAGNOSIS, PT EVAL
Pt admitted for sepsis; CT of Head, there is no gross evidence for calvarial fracture,acute infarct, or acute intracranial hemorrhage; CT of C/S (-); pt presents with decreased strength and decreased balance. Pt admitted for syncope and sepsis; CT of Head, there is no gross evidence for calvarial fracture,acute infarct, or acute intracranial hemorrhage; CT of C/S (-); pt presents with decreased strength and decreased balance.

## 2019-02-11 NOTE — PHYSICAL THERAPY INITIAL EVALUATION ADULT - PERTINENT HX OF CURRENT PROBLEM, REHAB EVAL
75yo F with PMH of breast CA on chemo (last treatment 8 days ago), CKD, HTN, DMT2 presenting s/p syncopal episode, found to be in neutropenic sepsis (T 39.4-39.7 C, -124, RR 20, lactate 2.9, WBC 0.35, 0.01 K/ul) in setting of recent chemotherapy, likely 2/2 PNA, c/b ISABELLA on CKD.

## 2019-02-11 NOTE — CHART NOTE - NSCHARTNOTEFT_GEN_A_CORE
MICU Transfer Note    Transfer from: MICU    Transfer to: (  ) Medicine    (  ) Telemetry     (   ) RCU        (    ) Palliative         (   ) Stroke Unit          (   ) __________________    Accepting Physician:  Signout given to:     MICU COURSE:    75 y/o F with PMH of breast CA on chemo (last treatment 8 days ago), CKD, HTN, DMT2 (not on home insulin) presenting s/p syncopal episode who was admitted for neutropenic sepsis 2/2 legionella, then transferred to the MICU on BiPAP for respiratory distress and worsening respiratory alkalosis. Once in the MICU patient had good O2 sat on BiPAP with no significant respiratory distress, intubation was not required. We continued her treatment for legionella with azithromycin, zosyn was d/c'd once pt was no longer neutropenic s/p neupogen x4.        In the ED: Pt was febrile (39.4-39.7 C), tachycardic (111-124), RR of 20, and normotensive (SBP 140s). Labs showed severe leukopenia (WBC 0.36), with neutropenia (0.01 K/ul), anemia, ISABELLA, high lactate (2.9). She was given 2L NS, cefepime vanco, APAP and ibuprofen.    RRT called on 2/7 for tachypnea and respiratory distress with desaturation to 82%. Patient started on BiPap with improvement in tachypnea and work of breathing. Febrile to 101 axillary. Patient accepted to MICU then downgraded with improvement overnight on BiPap.    MICU reconsulted on 2/9 for increased work of breathing and tachypnea. Patient has since been started on treatment for Legionella PNA with Azithromycin & Zosyn. Her oxygenation has been appropriate; yet has worsening respiratory alkalosis.         ASSESSMENT & PLAN:             FOR FOLLOW UP: MICU Transfer Note    Transfer from: MICU    Transfer to: (  ) Medicine    (  ) Telemetry     (   ) RCU        (    ) Palliative         (   ) Stroke Unit          (   ) __________________    Accepting Physician:  Signout given to:     MICU COURSE:    75 y/o F with PMH of breast CA on chemo (last treatment 8 days ago), CKD, HTN, DMT2 (not on home insulin) presenting s/p syncopal episode who was admitted for neutropenic sepsis 2/2 legionella, then transferred to the MICU on BiPAP for respiratory distress, worsening respiratory alkalosis and concern pt would need to be intubated. Once in the MICU patient had good O2 sat on BiPAP with no significant respiratory distress, intubation was not required. We continued treatment for legionella with azithromycin, zosyn was d/c'd once pt was no longer neutropenic s/p neupogen x4.        In the ED: Pt was febrile (39.4-39.7 C), tachycardic (111-124), RR of 20, and normotensive (SBP 140s). Labs showed severe leukopenia (WBC 0.36), with neutropenia (0.01 K/ul), anemia, ISABELLA, high lactate (2.9). She was given 2L NS, cefepime vanco, APAP and ibuprofen.    RRT called on 2/7 for tachypnea and respiratory distress with desaturation to 82%. Patient started on BiPap with improvement in tachypnea and work of breathing. Febrile to 101 axillary. Patient accepted to MICU then downgraded with improvement overnight on BiPap.    MICU reconsulted on 2/9 for increased work of breathing and tachypnea. Patient has since been started on treatment for Legionella PNA with Azithromycin & Zosyn. Her oxygenation has been appropriate; yet has worsening respiratory alkalosis.         ASSESSMENT & PLAN:             FOR FOLLOW UP: MICU Transfer Note    Transfer from: MICU    Transfer to: (  ) Medicine    (  ) Telemetry     (   ) RCU        (    ) Palliative         (   ) Stroke Unit          (   ) __________________    Accepting Physician:  Signout given to:     MICU COURSE:    73 y/o F with PMH of breast CA on chemo (last treatment 8 days ago), CKD, HTN, DMT2 (not on home insulin) presenting s/p syncopal episode who was admitted for neutropenic sepsis 2/2 legionella, then transferred to the MICU on BiPAP for respiratory distress, worsening respiratory alkalosis and concern pt would need to be intubated. Once in the MICU patient had good O2 sat on BiPAP with no significant respiratory distress, intubation was not required. We continued treatment for legionella with azithromycin, zosyn was d/c'd once pt was no longer neutropenic s/p neupogen x4. BiPAP was weaned to NC, pt now with O2 sat 100% on RA. Course c/b ISABELLA on CKD, possibly 2/2 [place] vs hemodynamic vs drug induced (filgastim), nephro following no need for HD at this time. Pt medically stabilized for transfer to floors for further evaluation and management.     Problem: ISABELLA (acute kidney injury).  Plan: Pt with baseline SCr 1.2-1.6 admitted with elevated SCr to 2.35 (2/5/19). Scr however improved to 1.6 on 2/7/19. Pt had a CT with IV contrast on 2/7/19. Scr elevated to 3.7 (2/10/19). Pt received two doses of Filgrastim during her stay (?GN). She also had one dose of ibruprofen. Pt with possible hemodynamic ISABELLA in the setting of IV contrast, PNA and NSAIDs. Scr elevated to 4.1 today. Pt with only 250cc urine output overnight? Advise measure urine output. Can trial lasix today.  UA with some blood and protein on 2/6. Pt non-oliguric.   Pt with 0.9g proteinuria on spot urine exam.   Check C3, C4, EMELI, ANCA, anti-GBM, dsDNA, HBSAg, HCV Ab, SIFE, RPR, cryoglobulin levels.  Avoid NSAIDs.    ASSESSMENT & PLAN:     73yo F with PMH of breast CA (dx with malignant neoplasm of right female breast and intraductal carcinoma in situ of left breast) on Cytoxan & Docetaxol , CKD, HTN, DMT2 admitted for neutropenic fever with sepsis 2/2 to RUL PNA with course c/b acute hypoxemic respiratory failure 2/2 to Legionella PNA.     #Neuro  -A&Ox4  -no active issues    #Respiratory   -Not requiring BiPAP since this AM, good O2 sat on RA  -ABG with metabolic acidosis with appropriate respiratory compensation  -will monitor respiratory status closely  -c/w abx for Legionella PNA    #CV  -hemodynamically stable  -no active issues  -QTc 450 on EKG today    #GI  -Consistent carb renal diet  -No active issues    #Renal/Metabolic  -ISABELLA on CKD, Cr still uptrending 4.1 (baseline ~1.3), contrast induced nephropathy vs drug induced (filgrastim) vs hemodynamic  -Metabolic acidosis w/ respiratory compensation  -Will c/w bicarb 650mg TID  -monitor closely; avoid nephrotoxins & renally dose medications  - f/u nephro recs    #ID  -c/w Azithromycin  -No longer neutropenic zosyn d/c'd  -BCx NGTD    #Heme/Onc  -resolution of neutropenia s/p neupogen  -Anemia likely 2/2 to pancytopenia in the setting of chemotherapy, resolved. Transfuse PRN to maintain Hgb >7.0    #Endocrine  -c/w ISS    #DVT ppx:   -HSQ     #HCM  - Out of bed to chair  - PT eval  - Patient Full Code      FOR FOLLOW UP: MICU Transfer Note    Transfer from: MICU    Transfer to: (  ) Medicine    (  ) Telemetry     (   ) RCU        (    ) Palliative         (   ) Stroke Unit          (   ) __________________    Accepting Physician:  Signout given to:     MICU COURSE:    75 y/o F with PMH of breast CA on chemo (last treatment 8 days ago), CKD, HTN, DMT2 (not on home insulin) presenting s/p syncopal episode who was admitted for neutropenic sepsis 2/2 legionella, then transferred to the MICU on BiPAP for respiratory distress, worsening respiratory alkalosis and concern pt would need to be intubated. Once in the MICU patient had good O2 sat on BiPAP with no significant respiratory distress, intubation was not required. We continued treatment for legionella with azithromycin, zosyn was d/c'd once pt was no longer neutropenic s/p neupogen x4. BiPAP was weaned to NC, pt now with O2 sat 100% on RA. Course c/b ISABELLA on CKD, possibly 2/2 [place] vs hemodynamic vs drug induced (filgastim), nephro following no need for HD at this time. Pt medically stabilized for transfer to floors for further evaluation and management.       ASSESSMENT & PLAN:     73yo F with PMH of breast CA (dx with malignant neoplasm of right female breast and intraductal carcinoma in situ of left breast) on Cytoxan & Docetaxol , CKD, HTN, DMT2 admitted for neutropenic fever with sepsis 2/2 to RUL PNA with course c/b acute hypoxemic respiratory failure 2/2 to Legionella PNA.     #Neuro  -A&Ox4  -no active issues    #Respiratory   -Not requiring BiPAP since this AM, good O2 sat on RA  -ABG with metabolic acidosis with appropriate respiratory compensation  -will monitor respiratory status closely  -c/w abx for Legionella PNA    #CV  -hemodynamically stable  -no active issues  -QTc 450 on EKG today    #GI  -Consistent carb renal diet  -No active issues    #Renal/Metabolic  -ISABELLA on CKD, Cr still uptrending 4.1 (baseline ~1.3), contrast induced nephropathy vs drug induced (filgrastim) vs hemodynamic  -Metabolic acidosis w/ respiratory compensation  -Will c/w bicarb 650mg TID  -monitor closely; avoid nephrotoxins & renally dose medications  - f/u nephro recs    #ID  -c/w Azithromycin  -No longer neutropenic zosyn d/c'd  -BCx NGTD    #Heme/Onc  -resolution of neutropenia s/p neupogen  -Anemia likely 2/2 to pancytopenia in the setting of chemotherapy, resolved. Transfuse PRN to maintain Hgb >7.0    #Endocrine  -c/w ISS    #DVT ppx:   -HSQ     #HCM  - Out of bed to chair  - PT eval  - Patient Full Code      FOR FOLLOW UP:  - monitor respiratory status closely  - f/u nephro labs and recs  - f/u PT eval MICU Transfer Note    Transfer from: MICU    Transfer to: (x  ) Medicine    (  ) Telemetry     (   ) RCU        (    ) Palliative         (   ) Stroke Unit          (   ) __________________    Accepting Physician: Dr. Albrecht  Signout given to:     MICU COURSE:    75 y/o F with PMH of breast CA on chemo (last treatment 8 days ago), CKD, HTN, DMT2 (not on home insulin) presenting s/p syncopal episode who was admitted for neutropenic sepsis 2/2 legionella, then transferred to the MICU on BiPAP for respiratory distress, worsening respiratory alkalosis and concern pt would need to be intubated. Once in the MICU patient had good O2 sat on BiPAP with no significant respiratory distress, intubation was not required. We continued treatment for legionella with azithromycin, zosyn was d/c'd once pt was no longer neutropenic s/p neupogen x4. BiPAP was weaned to NC, pt now with O2 sat 100% on RA. Course c/b ISABELLA on CKD, possibly 2/2 [place] vs hemodynamic vs drug induced (filgastim), nephro following no need for HD at this time. Pt medically stabilized for transfer to floors for further evaluation and management.       ASSESSMENT & PLAN:     75yo F with PMH of breast CA (dx with malignant neoplasm of right female breast and intraductal carcinoma in situ of left breast) on Cytoxan & Docetaxol , CKD, HTN, DMT2 admitted for neutropenic fever with sepsis 2/2 to RUL PNA with course c/b acute hypoxemic respiratory failure 2/2 to Legionella PNA.     #Neuro  -A&Ox4  -no active issues    #Respiratory   -Not requiring BiPAP since this AM, good O2 sat on RA  -ABG with metabolic acidosis with appropriate respiratory compensation  -will monitor respiratory status closely  -c/w abx for Legionella PNA    #CV  -hemodynamically stable  -no active issues  -QTc 450 on EKG today    #GI  -Consistent carb renal diet  -No active issues    #Renal/Metabolic  -ISABELLA on CKD, Cr still uptrending 4.1 (baseline ~1.3), contrast induced nephropathy vs drug induced (filgrastim) vs hemodynamic  -Metabolic acidosis w/ respiratory compensation  -Will c/w bicarb 650mg TID  -monitor closely; avoid nephrotoxins & renally dose medications  - f/u nephro recs    #ID  -c/w Azithromycin  -No longer neutropenic zosyn d/c'd  -BCx NGTD    #Heme/Onc  -resolution of neutropenia s/p neupogen  -Anemia likely 2/2 to pancytopenia in the setting of chemotherapy, resolved. Transfuse PRN to maintain Hgb >7.0    #Endocrine  -c/w ISS    #DVT ppx:   -HSQ     #HCM  - Out of bed to chair  - PT eval  - Patient Full Code      FOR FOLLOW UP:  - monitor respiratory status closely  - f/u nephro labs and recs  - f/u PT eval

## 2019-02-11 NOTE — CHART NOTE - NSCHARTNOTEFT_GEN_A_CORE
: Marco Parker    INDICATION: Hypoxic respiratory failure     PROCEDURE:  [ X] LIMITED ECHO  [ X] LIMITED CHEST  [ ] LIMITED RETROPERITONEAL  [ ] LIMITED ABDOMINAL  [ ] LIMITED DVT  [ ] NEEDLE GUIDANCE VASCULAR  [ ] NEEDLE GUIDANCE THORACENTESIS  [ ] NEEDLE GUIDANCE PARACENTESIS  [ ] NEEDLE GUIDANCE PERICARDIOCENTESIS  [ ] OTHER    FINDINGS:  LUNG: Kari predominate anteriorly, no significant consolidation or effusion    CARDIAC: grossly normal LVSF, no pericardial effusion    INTERPRETATION:  Improvement of patient's pna as no evidence seen on US and patient's oxygen requirements have also improved. Grossly normal cardiac function

## 2019-02-11 NOTE — DIETITIAN INITIAL EVALUATION ADULT. - PROBLEM SELECTOR PLAN 8
-Hx of HTN, on home amlodipine-benazepril 10 mg-40 mg   -BP stable (SBP 140s)  -hold home BP meds in setting of sepsis/volume depletion/syncope  -monitor BP

## 2019-02-11 NOTE — PROGRESS NOTE ADULT - SUBJECTIVE AND OBJECTIVE BOX
INTERVAL EVENTS:  No acute events overnight. Pt is now off oxygen and comfortable on room air. Remains afebrile.     Vital Signs Last 24 Hrs  T(C): 35.9 (11 Feb 2019 16:00), Max: 36.7 (11 Feb 2019 00:00)  T(F): 96.7 (11 Feb 2019 16:00), Max: 98.1 (11 Feb 2019 04:00)  HR: 86 (11 Feb 2019 16:00) (76 - 100)  BP: 110/93 (11 Feb 2019 16:00) (110/93 - 165/64)  BP(mean): 98 (11 Feb 2019 16:00) (75 - 106)  RR: 26 (11 Feb 2019 16:00) (22 - 34)  SpO2: 99% (11 Feb 2019 16:00) (96% - 100%)      PHYSICAL EXAM:   GENERAL: no acute distress  HEENT: EOMI, neck supple  RESPIRATORY: LCTAB/L, no rhonchi, rales, or wheezing  CARDIOVASCULAR: RRR, no murmurs, gallops, rubs  ABDOMINAL: soft, non-tender, non-distended, positive bowel sounds   EXTREMITIES: no clubbing, cyanosis, or edema  NEUROLOGICAL: alert and oriented x 3, non-focal  SKIN: no rashes or lesions   MUSCULOSKELETAL: no gross joint deformity                          8.1    16.13 )-----------( 172      ( 11 Feb 2019 02:35 )             24.7     02-11    134<L>  |  104  |  74<H>  ----------------------------<  154<H>  3.1<L>   |  12<L>  |  4.10<H>    Ca    9.6      11 Feb 2019 02:35  Phos  4.5     02-11  Mg     2.8     02-11    TPro  6.6  /  Alb  2.8<L>  /  TBili  0.3  /  DBili  x   /  AST  73<H>  /  ALT  41<H>  /  AlkPhos  137<H>  02-11    PT/INR - ( 10 Feb 2019 05:40 )   PT: 15.6 SEC;   INR: 1.39          PTT - ( 10 Feb 2019 05:40 )  PTT:37.9 SEC    MEDICATIONS  (STANDING):  azithromycin  IVPB 500 milliGRAM(s) IV Intermittent every 24 hours  azithromycin  IVPB      chlorhexidine 4% Liquid 1 Application(s) Topical two times a day  dextrose 5%. 1000 milliLiter(s) (50 mL/Hr) IV Continuous <Continuous>  dextrose 50% Injectable 12.5 Gram(s) IV Push once  dextrose 50% Injectable 25 Gram(s) IV Push once  dextrose 50% Injectable 25 Gram(s) IV Push once  heparin  Injectable 5000 Unit(s) SubCutaneous every 8 hours  insulin lispro (HumaLOG) corrective regimen sliding scale   SubCutaneous at bedtime  insulin lispro (HumaLOG) corrective regimen sliding scale   SubCutaneous three times a day before meals  sodium bicarbonate 650 milliGRAM(s) Oral three times a day

## 2019-02-11 NOTE — PROGRESS NOTE ADULT - ATTENDING COMMENTS
74 year old woman with breast CA on Cytoxan & Docetaxol (last dose 1/28) , CKD, HTN, DMT2 admitted with neutropenic fever, RUL legionella PNA, sepsis with ISABELLA    - hypoxemic respiratory failure and increased work of breathing improved now off BiPAP, renal following  - metabolic acidemia secondary to renal failure stable with oral bicarb  - hemodynamically stable no need for vasopressors  - continue antibiotics    eligible for transfer to floor  ,

## 2019-02-11 NOTE — DIETITIAN INITIAL EVALUATION ADULT. - NS AS NUTRI INTERV MEALS SNACK
continue Consistent Carbohydrate Renal Replacement diet/Carbohydrate - modified diet/Mineral - modified diet

## 2019-02-11 NOTE — PROGRESS NOTE ADULT - ASSESSMENT
75yo F with PMH of breast CA (dx with malignant neoplasm of right female breast and intraductal carcinoma in situ of left breast) on Cytoxan & Docetaxol , CKD, HTN, DMT2 admitted for neutropenic fever with sepsis 2/2 to RUL PNA with course c/b acute hypoxemic respiratory failure 2/2 to Legionella PNA.     #Neuro  -A&Ox4  -no active issues    #Respiratory   -Not requiring BiPAP since this AM, good O2 sat on 4L NC, will continue to wean supplemental oxygen as tolerated  -ABG with metabolic acidosis with appropriate respiratory compensation  -will monitor respiratory status closely  -c/w abx for Legionella PNA    #CV  -hemodynamically stable  -no active issues    #GI  -Consistent carb renal diet  -No active issues    #Renal/Metabolic  -ISABELLA on CKD, Cr still uptrending 3.79 (baseline ~1.3), contrast induced nephropathy vs prerenal vs drug induced (filgrastim)  -Will obtain urine studies  -Metabolic acidosis w/ respiratory compensation  -Will start bicarb 650mg TID  -monitor closely; avoid nephrotoxins & renally dose medications  - f/u nephro recs    #ID  -c/w Azithromycin  -No longer neutropenic will d/c zosyn  -BCx NGTD    #Heme/Onc  -resolution of neutropenia s/p neupogen  -Anemia likely 2/2 to pancytopenia in the setting of chemotherapy. Transfuse PRN to maintain Hgb >7.0    #Endocrine  -c/w ISS    #DVT ppx:   -HSQ     #HCM  - Out of bed to chair  - PT eval  - Patient Full Code 73yo F with PMH of breast CA (dx with malignant neoplasm of right female breast and intraductal carcinoma in situ of left breast) on Cytoxan & Docetaxol , CKD, HTN, DMT2 admitted for neutropenic fever with sepsis 2/2 to RUL PNA with course c/b acute hypoxemic respiratory failure 2/2 to Legionella PNA.     #Neuro  -A&Ox4  -no active issues    #Respiratory   -Not requiring BiPAP since this AM, good O2 sat on 4L NC, will continue to wean supplemental oxygen as tolerated  -ABG with metabolic acidosis with appropriate respiratory compensation  -will monitor respiratory status closely  -c/w abx for Legionella PNA    #CV  -hemodynamically stable  -no active issues  -QTc 450    #GI  -Consistent carb renal diet  -No active issues    #Renal/Metabolic  -ISABELLA on CKD, Cr still uptrending 4.1 (baseline ~1.3), contrast induced nephropathy vs drug induced (filgrastim)  -Metabolic acidosis w/ respiratory compensation  -Will c/w bicarb 650mg TID  -monitor closely; avoid nephrotoxins & renally dose medications  - f/u nephro recs    #ID  -c/w Azithromycin  -No longer neutropenic zosyn d/c'd  -BCx NGTD    #Heme/Onc  -resolution of neutropenia s/p neupogen  -Anemia likely 2/2 to pancytopenia in the setting of chemotherapy, resolved. Transfuse PRN to maintain Hgb >7.0    #Endocrine  -c/w ISS    #DVT ppx:   -HSQ     #HCM  - Out of bed to chair  - PT eval  - Patient Full Code

## 2019-02-11 NOTE — DIETITIAN INITIAL EVALUATION ADULT. - PROBLEM SELECTOR PLAN 1
-Met sepsis criteria in the ED (T 39.4-39.7 C, -124, RR 20, lactate 2.9), with severe leukopenia (WBC 0.35) and neutropenia (0.01 K/ul), likely secondary to HCAP  -Pt received 2L NS, cefepime vanco, APAP and ibuprofen.   -Source possibly PNA in the setting of cough and opacity on CXR vs GI in the setting of diarrhea  -Continue IV Vanco, Cefepime, and Zithromax. Monitor Vanco level.   -F/u blood/urine cultures  -check stool studies   -monitor VS q4h

## 2019-02-11 NOTE — PROGRESS NOTE ADULT - SUBJECTIVE AND OBJECTIVE BOX
Christelle Ngo, PGY2  Internal Medicine, team yellow  Pager [phone] ([facility]) / [phone] ([facility])    OVERNIGHT EVENTS / SUBJECTIVE: Patient seen and examined at bedside.     OBJECTIVE:    VITAL SIGNS:  ICU Vital Signs Last 24 Hrs  T(C): 36.7 (2019 04:00), Max: 37.9 (10 Feb 2019 16:00)  T(F): 98.1 (2019 04:00), Max: 100.2 (10 Feb 2019 16:00)  HR: 80 (2019 07:00) (68 - 105)  BP: 165/64 (2019 07:00) (111/87 - 165/64)  BP(mean): 86 (2019 07:00) (55 - 105)  ABP: --  ABP(mean): --  RR: 34 (2019 07:00) (13 - 34)  SpO2: 100% (2019 07:00) (92% - 100%)        02-10 @ 07:01  -  -11 @ 07:00  --------------------------------------------------------  IN: 1370 mL / OUT: 950 mL / NET: 420 mL      CAPILLARY BLOOD GLUCOSE      POCT Blood Glucose.: 159 mg/dL (2019 05:56)      PHYSICAL EXAM:    General: NAD  HEENT: NC/AT; PERRL, clear conjunctiva  Neck: supple  Respiratory: RUL crackles, otherwise CTA  Cardiovascular: +S1/S2; RRR  Abdomen: soft, NT/ND; +BS x4  Extremities: 1+ pitting edema b/l LE, WWP, 2+ peripheral pulses b/l  Skin: normal color and turgor; no rash  Neurological: A&Ox4, no focal neuro deficits    MEDICATIONS:  MEDICATIONS  (STANDING):  azithromycin  IVPB 500 milliGRAM(s) IV Intermittent every 24 hours  azithromycin  IVPB      chlorhexidine 4% Liquid 1 Application(s) Topical two times a day  dextrose 5%. 1000 milliLiter(s) (50 mL/Hr) IV Continuous <Continuous>  dextrose 50% Injectable 12.5 Gram(s) IV Push once  dextrose 50% Injectable 25 Gram(s) IV Push once  dextrose 50% Injectable 25 Gram(s) IV Push once  heparin  Injectable 5000 Unit(s) SubCutaneous every 8 hours  insulin lispro (HumaLOG) corrective regimen sliding scale   SubCutaneous at bedtime  insulin lispro (HumaLOG) corrective regimen sliding scale   SubCutaneous three times a day before meals  lactated ringers. 1000 milliLiter(s) (100 mL/Hr) IV Continuous <Continuous>  sodium bicarbonate 650 milliGRAM(s) Oral three times a day    MEDICATIONS  (PRN):  acetaminophen   Tablet .. 650 milliGRAM(s) Oral every 6 hours PRN Mild Pain (1 - 3)  dextrose 40% Gel 15 Gram(s) Oral once PRN Blood Glucose LESS THAN 70 milliGRAM(s)/deciliter  glucagon  Injectable 1 milliGRAM(s) IntraMuscular once PRN Glucose LESS THAN 70 milligrams/deciliter      ALLERGIES:  Allergies    No Known Allergies    Intolerances    codeine (Nausea)      LABS:                        8.1    16.13 )-----------( 172      ( 2019 02:35 )             24.7     Hemoglobin: 8.1 g/dL ( @ 02:35)  Hemoglobin: 8.4 g/dL (02-10 @ 03:10)  Hemoglobin: 9.2 g/dL ( @ 07:21)  Hemoglobin: 7.2 g/dL ( @ 09:35)  Hemoglobin: 6.7 g/dL ( @ 07:00)    CBC Full  -  ( 2019 02:35 )  WBC Count : 16.13 K/uL  Hemoglobin : 8.1 g/dL  Hematocrit : 24.7 %  Platelet Count - Automated : 172 K/uL  Mean Cell Volume : 85.5 fL  Mean Cell Hemoglobin : 28.0 pg  Mean Cell Hemoglobin Concentration : 32.8 %  Auto Neutrophil # : 13.81 K/uL  Auto Lymphocyte # : 0.97 K/uL  Auto Monocyte # : 0.59 K/uL  Auto Eosinophil # : 0.01 K/uL  Auto Basophil # : 0.03 K/uL  Auto Neutrophil % : 85.5 %  Auto Lymphocyte % : 6.0 %  Auto Monocyte % : 3.7 %  Auto Eosinophil % : 0.1 %  Auto Basophil % : 0.2 %        134<L>  |  104  |  74<H>  ----------------------------<  154<H>  3.1<L>   |  12<L>  |  4.10<H>    Ca    9.6      2019 02:35  Phos  4.5     11  Mg     2.8         TPro  6.6  /  Alb  2.8<L>  /  TBili  0.3  /  DBili  x   /  AST  73<H>  /  ALT  41<H>  /  AlkPhos  137<H>      Creatinine Trend: 4.10<--, 3.79<--, 2.18<--, 1.62<--, 1.65<--, 1.73<--  LIVER FUNCTIONS - ( 2019 02:35 )  Alb: 2.8 g/dL / Pro: 6.6 g/dL / ALK PHOS: 137 u/L / ALT: 41 u/L / AST: 73 u/L / GGT: x           PT/INR - ( 10 Feb 2019 05:40 )   PT: 15.6 SEC;   INR: 1.39          PTT - ( 10 Feb 2019 05:40 )  PTT:37.9 SEC      ABG - ( 10 Feb 2019 03:10 )  pH, Arterial: 7.32  pH, Blood: x     /  pCO2: 24    /  pO2: 161   / HCO3: 14    / Base Excess: -13.0 /  SaO2: 98.9                02:35 - VBG - pH: 7.29  | pCO2: 30    | pO2: 49    | Lactate: 1.0        Urinalysis Basic - ( 10 Feb 2019 21:10 )    Color: YELLOW / Appearance: Lt TURBID / S.014 / pH: 6.0  Gluc: NEGATIVE / Ketone: NEGATIVE  / Bili: NEGATIVE / Urobili: NORMAL   Blood: MODERATE / Protein: 50 / Nitrite: NEGATIVE   Leuk Esterase: NEGATIVE / RBC: >50 / WBC 3-5   Sq Epi: FEW / Non Sq Epi: x / Bacteria: NEGATIVE        EKG:   MICROBIOLOGY:    Culture - Blood (collected 2019 21:47)  Source: BLOOD PERIPHERAL  Preliminary Report (10 Feb 2019 21:45):    NO ORGANISMS ISOLATED    NO ORGANISMS ISOLATED AT 48 HRS.    Culture - Blood (collected 2019 21:47)  Source: BLOOD VENOUS  Preliminary Report (10 Feb 2019 21:45):    NO ORGANISMS ISOLATED    NO ORGANISMS ISOLATED AT 48 HRS.      IMAGING:    RADIOLOGY & ADDITIONAL TESTS: Reviewed. OVERNIGHT EVENTS / SUBJECTIVE: Patient seen and examined at bedside.     OBJECTIVE:    VITAL SIGNS:  ICU Vital Signs Last 24 Hrs  T(C): 36.7 (2019 04:00), Max: 37.9 (10 Feb 2019 16:00)  T(F): 98.1 (2019 04:00), Max: 100.2 (10 Feb 2019 16:00)  HR: 80 (2019 07:00) (68 - 105)  BP: 165/64 (2019 07:00) (111/87 - 165/64)  BP(mean): 86 (2019 07:00) (55 - 105)  ABP: --  ABP(mean): --  RR: 34 (2019 07:00) (13 - 34)  SpO2: 100% (2019 07:00) (92% - 100%)        02-10 @ 07:01  -  02-11 @ 07:00  --------------------------------------------------------  IN: 1370 mL / OUT: 950 mL / NET: 420 mL      CAPILLARY BLOOD GLUCOSE      POCT Blood Glucose.: 159 mg/dL (2019 05:56)      PHYSICAL EXAM:    General: NAD  HEENT: NC/AT; PERRL, clear conjunctiva  Neck: supple  Respiratory: RUL crackles, otherwise CTA  Cardiovascular: +S1/S2; RRR  Abdomen: soft, NT/ND; +BS x4  Extremities: 1+ pitting edema b/l LE, WWP, 2+ peripheral pulses b/l  Skin: normal color and turgor; no rash  Neurological: A&Ox4, no focal neuro deficits    MEDICATIONS:  MEDICATIONS  (STANDING):  azithromycin  IVPB 500 milliGRAM(s) IV Intermittent every 24 hours  azithromycin  IVPB      chlorhexidine 4% Liquid 1 Application(s) Topical two times a day  dextrose 5%. 1000 milliLiter(s) (50 mL/Hr) IV Continuous <Continuous>  dextrose 50% Injectable 12.5 Gram(s) IV Push once  dextrose 50% Injectable 25 Gram(s) IV Push once  dextrose 50% Injectable 25 Gram(s) IV Push once  heparin  Injectable 5000 Unit(s) SubCutaneous every 8 hours  insulin lispro (HumaLOG) corrective regimen sliding scale   SubCutaneous at bedtime  insulin lispro (HumaLOG) corrective regimen sliding scale   SubCutaneous three times a day before meals  lactated ringers. 1000 milliLiter(s) (100 mL/Hr) IV Continuous <Continuous>  sodium bicarbonate 650 milliGRAM(s) Oral three times a day    MEDICATIONS  (PRN):  acetaminophen   Tablet .. 650 milliGRAM(s) Oral every 6 hours PRN Mild Pain (1 - 3)  dextrose 40% Gel 15 Gram(s) Oral once PRN Blood Glucose LESS THAN 70 milliGRAM(s)/deciliter  glucagon  Injectable 1 milliGRAM(s) IntraMuscular once PRN Glucose LESS THAN 70 milligrams/deciliter      ALLERGIES:  Allergies    No Known Allergies    Intolerances    codeine (Nausea)      LABS:                        8.1    16.13 )-----------( 172      ( 2019 02:35 )             24.7     Hemoglobin: 8.1 g/dL ( @ 02:35)  Hemoglobin: 8.4 g/dL (02-10 @ 03:10)  Hemoglobin: 9.2 g/dL ( @ 07:21)  Hemoglobin: 7.2 g/dL ( @ 09:35)  Hemoglobin: 6.7 g/dL ( @ 07:00)    CBC Full  -  ( 2019 02:35 )  WBC Count : 16.13 K/uL  Hemoglobin : 8.1 g/dL  Hematocrit : 24.7 %  Platelet Count - Automated : 172 K/uL  Mean Cell Volume : 85.5 fL  Mean Cell Hemoglobin : 28.0 pg  Mean Cell Hemoglobin Concentration : 32.8 %  Auto Neutrophil # : 13.81 K/uL  Auto Lymphocyte # : 0.97 K/uL  Auto Monocyte # : 0.59 K/uL  Auto Eosinophil # : 0.01 K/uL  Auto Basophil # : 0.03 K/uL  Auto Neutrophil % : 85.5 %  Auto Lymphocyte % : 6.0 %  Auto Monocyte % : 3.7 %  Auto Eosinophil % : 0.1 %  Auto Basophil % : 0.2 %        134<L>  |  104  |  74<H>  ----------------------------<  154<H>  3.1<L>   |  12<L>  |  4.10<H>    Ca    9.6      2019 02:35  Phos  4.5     0211  Mg     2.8     11    TPro  6.6  /  Alb  2.8<L>  /  TBili  0.3  /  DBili  x   /  AST  73<H>  /  ALT  41<H>  /  AlkPhos  137<H>      Creatinine Trend: 4.10<--, 3.79<--, 2.18<--, 1.62<--, 1.65<--, 1.73<--  LIVER FUNCTIONS - ( 2019 02:35 )  Alb: 2.8 g/dL / Pro: 6.6 g/dL / ALK PHOS: 137 u/L / ALT: 41 u/L / AST: 73 u/L / GGT: x           PT/INR - ( 10 Feb 2019 05:40 )   PT: 15.6 SEC;   INR: 1.39          PTT - ( 10 Feb 2019 05:40 )  PTT:37.9 SEC      ABG - ( 10 Feb 2019 03:10 )  pH, Arterial: 7.32  pH, Blood: x     /  pCO2: 24    /  pO2: 161   / HCO3: 14    / Base Excess: -13.0 /  SaO2: 98.9                02:35 - VBG - pH: 7.29  | pCO2: 30    | pO2: 49    | Lactate: 1.0        Urinalysis Basic - ( 10 Feb 2019 21:10 )    Color: YELLOW / Appearance: Lt TURBID / S.014 / pH: 6.0  Gluc: NEGATIVE / Ketone: NEGATIVE  / Bili: NEGATIVE / Urobili: NORMAL   Blood: MODERATE / Protein: 50 / Nitrite: NEGATIVE   Leuk Esterase: NEGATIVE / RBC: >50 / WBC 3-5   Sq Epi: FEW / Non Sq Epi: x / Bacteria: NEGATIVE        EKG:   MICROBIOLOGY:    Culture - Blood (collected 2019 21:47)  Source: BLOOD PERIPHERAL  Preliminary Report (10 Feb 2019 21:45):    NO ORGANISMS ISOLATED    NO ORGANISMS ISOLATED AT 48 HRS.    Culture - Blood (collected 2019 21:47)  Source: BLOOD VENOUS  Preliminary Report (10 Feb 2019 21:45):    NO ORGANISMS ISOLATED    NO ORGANISMS ISOLATED AT 48 HRS.      IMAGING:    RADIOLOGY & ADDITIONAL TESTS: Reviewed. OVERNIGHT EVENTS / SUBJECTIVE: Patient seen and examined at bedside.     OBJECTIVE: No acute events overnight. Pt has no complaints at this time. She denies any pain or SOB.    VITAL SIGNS:  ICU Vital Signs Last 24 Hrs  T(C): 36.7 (2019 04:00), Max: 37.9 (10 Feb 2019 16:00)  T(F): 98.1 (2019 04:00), Max: 100.2 (10 Feb 2019 16:00)  HR: 80 (2019 07:00) (68 - 105)  BP: 165/64 (2019 07:00) (111/87 - 165/64)  BP(mean): 86 (2019 07:00) (55 - 105)  ABP: --  ABP(mean): --  RR: 34 (2019 07:00) (13 - 34)  SpO2: 100% (2019 07:00) (92% - 100%)        02-10 @ 07:01  -  - @ 07:00  --------------------------------------------------------  IN: 1370 mL / OUT: 950 mL / NET: 420 mL      CAPILLARY BLOOD GLUCOSE      POCT Blood Glucose.: 159 mg/dL (2019 05:56)      PHYSICAL EXAM:    General: NAD  HEENT: NC/AT; PERRL, clear conjunctiva  Neck: supple  Respiratory: RUL crackles, otherwise CTA  Cardiovascular: +S1/S2; RRR  Abdomen: soft, NT/ND; +BS x4  Extremities: 1+ pitting edema b/l LE, WWP, 2+ peripheral pulses b/l  Skin: normal color and turgor; no rash  Neurological: A&Ox4, no focal neuro deficits    MEDICATIONS:  MEDICATIONS  (STANDING):  azithromycin  IVPB 500 milliGRAM(s) IV Intermittent every 24 hours  azithromycin  IVPB      chlorhexidine 4% Liquid 1 Application(s) Topical two times a day  dextrose 5%. 1000 milliLiter(s) (50 mL/Hr) IV Continuous <Continuous>  dextrose 50% Injectable 12.5 Gram(s) IV Push once  dextrose 50% Injectable 25 Gram(s) IV Push once  dextrose 50% Injectable 25 Gram(s) IV Push once  heparin  Injectable 5000 Unit(s) SubCutaneous every 8 hours  insulin lispro (HumaLOG) corrective regimen sliding scale   SubCutaneous at bedtime  insulin lispro (HumaLOG) corrective regimen sliding scale   SubCutaneous three times a day before meals  lactated ringers. 1000 milliLiter(s) (100 mL/Hr) IV Continuous <Continuous>  sodium bicarbonate 650 milliGRAM(s) Oral three times a day    MEDICATIONS  (PRN):  acetaminophen   Tablet .. 650 milliGRAM(s) Oral every 6 hours PRN Mild Pain (1 - 3)  dextrose 40% Gel 15 Gram(s) Oral once PRN Blood Glucose LESS THAN 70 milliGRAM(s)/deciliter  glucagon  Injectable 1 milliGRAM(s) IntraMuscular once PRN Glucose LESS THAN 70 milligrams/deciliter      ALLERGIES:  Allergies    No Known Allergies    Intolerances    codeine (Nausea)      LABS:                        8.1    16.13 )-----------( 172      ( 2019 02:35 )             24.7     Hemoglobin: 8.1 g/dL ( @ 02:35)  Hemoglobin: 8.4 g/dL (02-10 @ 03:10)  Hemoglobin: 9.2 g/dL ( @ 07:21)  Hemoglobin: 7.2 g/dL ( @ 09:35)  Hemoglobin: 6.7 g/dL ( @ 07:00)    CBC Full  -  ( 2019 02:35 )  WBC Count : 16.13 K/uL  Hemoglobin : 8.1 g/dL  Hematocrit : 24.7 %  Platelet Count - Automated : 172 K/uL  Mean Cell Volume : 85.5 fL  Mean Cell Hemoglobin : 28.0 pg  Mean Cell Hemoglobin Concentration : 32.8 %  Auto Neutrophil # : 13.81 K/uL  Auto Lymphocyte # : 0.97 K/uL  Auto Monocyte # : 0.59 K/uL  Auto Eosinophil # : 0.01 K/uL  Auto Basophil # : 0.03 K/uL  Auto Neutrophil % : 85.5 %  Auto Lymphocyte % : 6.0 %  Auto Monocyte % : 3.7 %  Auto Eosinophil % : 0.1 %  Auto Basophil % : 0.2 %        134<L>  |  104  |  74<H>  ----------------------------<  154<H>  3.1<L>   |  12<L>  |  4.10<H>    Ca    9.6      2019 02:35  Phos  4.5     11  Mg     2.8         TPro  6.6  /  Alb  2.8<L>  /  TBili  0.3  /  DBili  x   /  AST  73<H>  /  ALT  41<H>  /  AlkPhos  137<H>      Creatinine Trend: 4.10<--, 3.79<--, 2.18<--, 1.62<--, 1.65<--, 1.73<--  LIVER FUNCTIONS - ( 2019 02:35 )  Alb: 2.8 g/dL / Pro: 6.6 g/dL / ALK PHOS: 137 u/L / ALT: 41 u/L / AST: 73 u/L / GGT: x           PT/INR - ( 10 Feb 2019 05:40 )   PT: 15.6 SEC;   INR: 1.39          PTT - ( 10 Feb 2019 05:40 )  PTT:37.9 SEC      ABG - ( 10 Feb 2019 03:10 )  pH, Arterial: 7.32  pH, Blood: x     /  pCO2: 24    /  pO2: 161   / HCO3: 14    / Base Excess: -13.0 /  SaO2: 98.9                02:35 - VBG - pH: 7.29  | pCO2: 30    | pO2: 49    | Lactate: 1.0        Urinalysis Basic - ( 10 Feb 2019 21:10 )    Color: YELLOW / Appearance: Lt TURBID / S.014 / pH: 6.0  Gluc: NEGATIVE / Ketone: NEGATIVE  / Bili: NEGATIVE / Urobili: NORMAL   Blood: MODERATE / Protein: 50 / Nitrite: NEGATIVE   Leuk Esterase: NEGATIVE / RBC: >50 / WBC 3-5   Sq Epi: FEW / Non Sq Epi: x / Bacteria: NEGATIVE        EKG:   MICROBIOLOGY:    Culture - Blood (collected 2019 21:47)  Source: BLOOD PERIPHERAL  Preliminary Report (10 Feb 2019 21:45):    NO ORGANISMS ISOLATED    NO ORGANISMS ISOLATED AT 48 HRS.    Culture - Blood (collected 2019 21:47)  Source: BLOOD VENOUS  Preliminary Report (10 Feb 2019 21:45):    NO ORGANISMS ISOLATED    NO ORGANISMS ISOLATED AT 48 HRS.      IMAGING:    RADIOLOGY & ADDITIONAL TESTS: Reviewed.

## 2019-02-11 NOTE — DIETITIAN INITIAL EVALUATION ADULT. - PROBLEM SELECTOR PLAN 2
-RLL opacity on CXR   -continue IV Vanco and Cefepime for possible MRSA or gram negative PNA  -add Zithromax for atypical coverage   -f/up blood cultures, sputum culture if cough productive   -check urine legionella

## 2019-02-11 NOTE — PHYSICAL THERAPY INITIAL EVALUATION ADULT - GENERAL OBSERVATIONS, REHAB EVAL
Pt encountered in semisupine position in MICU, no distress, AxOx3/4, with +IV, +tele, +miller, chest port, and bilateral UE precautions

## 2019-02-11 NOTE — PROGRESS NOTE ADULT - ATTENDING COMMENTS
pancytopenia resolving and will stop following at this time, call with any further questions, pt to follow with primary medical oncologist after discharge

## 2019-02-11 NOTE — DIETITIAN INITIAL EVALUATION ADULT. - PROBLEM SELECTOR PLAN 7
-On home glimepride 1 mg, will hold   -serum glucose 251  -start low dose insulin sliding scale with FS premeal and qhs  -check HbA1C

## 2019-02-11 NOTE — PHYSICAL THERAPY INITIAL EVALUATION ADULT - ADDITIONAL COMMENTS
Pt reports that she lives in a private house with her daughter with ~5STE through front and side of house; (+)1 handrail; bedroom/bathroom on first floor. Prior to hospital admission pt was completely independent and used no assistive device with household ambulation and used single axis cane with community ambulation. Pt also owns rolling walker @ home although does not use it.    Pt left comfortable in bed, NAD, all lines intact, all precautions maintained, with call bell in reach, and RN aware of PT evaluation.

## 2019-02-11 NOTE — PROGRESS NOTE ADULT - SUBJECTIVE AND OBJECTIVE BOX
[facility] DIVISION OF KIDNEY DISEASES AND HYPERTENSION -- FOLLOW UP NOTE  --------------------------------------------------------------------------------    HPI: 73yo F with PMH of breast CA (dx with malignant neoplasm of right female breast and intraductal carcinoma in situ of left breast) on chemo (last treatment 8 days ago), CKD, HTN, DMT2 (not on home insulin) admitted for syncopal episode. Pt found to have legionella PNA requiring bipap. Nephrology consulted for ISABELLA. Pt with baseline SCr 1.2-1.6 admitted with elevated SCr to 2.35 (2/5/19). Scr however improved to 1.6 on 2/7/19. Pt had a CT with IV contrast on 2/7/19. Scr elevated to 3.7 today (2/10/19). Pt received two dose of Filgrastim during her stay (?GN). She also had one dose of ibruprofen. Pt admits to taking NSAIDs daily at home as well. Pt seen and examined. Pt on nasal cannula today     PAST HISTORY  --------------------------------------------------------------------------------  No significant changes to PMH, PSH, FHx, SHx, unless otherwise noted    ALLERGIES & MEDICATIONS  --------------------------------------------------------------------------------  Allergies    No Known Allergies    Intolerances    codeine (Nausea)    Standing Inpatient Medications  azithromycin  IVPB 500 milliGRAM(s) IV Intermittent every 24 hours  azithromycin  IVPB      chlorhexidine 4% Liquid 1 Application(s) Topical two times a day  dextrose 5%. 1000 milliLiter(s) IV Continuous <Continuous>  dextrose 50% Injectable 12.5 Gram(s) IV Push once  dextrose 50% Injectable 25 Gram(s) IV Push once  dextrose 50% Injectable 25 Gram(s) IV Push once  heparin  Injectable 5000 Unit(s) SubCutaneous every 8 hours  insulin lispro (HumaLOG) corrective regimen sliding scale   SubCutaneous at bedtime  insulin lispro (HumaLOG) corrective regimen sliding scale   SubCutaneous three times a day before meals  lactated ringers. 1000 milliLiter(s) IV Continuous <Continuous>  potassium chloride    Tablet ER 20 milliEquivalent(s) Oral every 2 hours  sodium bicarbonate 650 milliGRAM(s) Oral three times a day    PRN Inpatient Medications  acetaminophen   Tablet .. 650 milliGRAM(s) Oral every 6 hours PRN  dextrose 40% Gel 15 Gram(s) Oral once PRN  glucagon  Injectable 1 milliGRAM(s) IntraMuscular once PRN      REVIEW OF SYSTEMS  --------------------------------------------------------------------------------  Gen: No weakness  Skin: No rashes  Head/Eyes/Ears/Mouth: No headache  Respiratory: No dyspnea, cough  CV: No chest pain, PND, orthopnea  GI: No abdominal pain, diarrhea, constipation, nausea, vomiting  : No increased frequency, dysuria  MSK: No edema  Neuro: No dizziness/lightheadedness  Heme: No easy bruising or bleeding    All other systems were reviewed and are negative, except as noted.    VITALS/PHYSICAL EXAM  --------------------------------------------------------------------------------  T(C): 36.2 (02-11-19 @ 08:00), Max: 37.9 (02-10-19 @ 16:00)  HR: 80 (02-11-19 @ 08:00) (71 - 105)  BP: 151/76 (02-11-19 @ 08:00) (111/87 - 165/64)  RR: 31 (02-11-19 @ 08:00) (13 - 34)  SpO2: 100% (02-11-19 @ 08:00) (92% - 100%)  Wt(kg): --    02-10-19 @ 07:01  -  02-11-19 @ 07:00  --------------------------------------------------------  IN: 1370 mL / OUT: 950 mL / NET: 420 mL    Physical Exam:  	Gen: NAD  	Pulm: CTA B/L  	CV: RRR, S1S2; no rub  	Abd: +BS, soft, nontender/nondistended  	: No suprapubic tenderness  	UE: Warm, no asterixis  	LE: Warm,  no edema  	Psych: Normal affect and mood  	Skin: Warm, without rashes    LABS/STUDIES  --------------------------------------------------------------------------------              8.1    16.13 >-----------<  172      [02-11-19 @ 02:35]              24.7     134  |  104  |  74  ----------------------------<  154      [02-11-19 @ 02:35]  3.1   |  12  |  4.10        Ca     9.6     [02-11-19 @ 02:35]      Mg     2.8     [02-11-19 @ 02:35]      Phos  4.5     [02-11-19 @ 02:35]    TPro  6.6  /  Alb  2.8  /  TBili  0.3  /  DBili  x   /  AST  73  /  ALT  41  /  AlkPhos  137  [02-11-19 @ 02:35]    PT/INR: PT 15.6 , INR 1.39       [02-10-19 @ 05:40]  PTT: 37.9       [02-10-19 @ 05:40]    Creatinine Trend:  SCr 4.10 [02-11 @ 02:35]  SCr 3.79 [02-10 @ 03:10]  SCr 2.18 [02-09 @ 07:21]  SCr 1.62 [02-08 @ 07:00]  SCr 1.65 [02-07 @ 05:03]    Urinalysis - [02-10-19 @ 21:10]      Color YELLOW / Appearance Lt TURBID / SG 1.014 / pH 6.0      Gluc NEGATIVE / Ketone NEGATIVE  / Bili NEGATIVE / Urobili NORMAL       Blood MODERATE / Protein 50 / Leuk Est NEGATIVE / Nitrite NEGATIVE      RBC >50 / WBC 3-5 / Hyaline 2+ / Gran  / Sq Epi FEW / Non Sq Epi  / Bacteria NEGATIVE    Urine Creatinine 55.90      [02-10-19 @ 21:10]  Urine Protein 50.8      [02-10-19 @ 21:10]  Urine Sodium 80      [02-10-19 @ 21:10]  Urine Urea Nitrogen 421.3      [02-06-19 @ 14:50]  Urine Potassium 16.1      [02-10-19 @ 21:10]  Urine Chloride 78      [02-10-19 @ 21:10]  Urine Osmolality 342      [02-06-19 @ 14:50]    Iron 11, TIBC 133, %sat --      [02-06-19 @ 06:01]  Ferritin 1928      [02-06-19 @ 06:01]  HbA1c 7.6      [02-05-19 @ 21:25]

## 2019-02-11 NOTE — CHART NOTE - NSCHARTNOTEFT_GEN_A_CORE
MAR TRANSFER NOTE    73 y/o F with PMH of breast CA on chemo (last treatment 8 days ago), CKD, HTN, DMT2 (not on home insulin) presenting s/p syncopal episode who was admitted for neutropenic sepsis 2/2 legionella, then transferred to the MICU on BiPAP for respiratory distress, worsening respiratory alkalosis and concern pt would need to be intubated. Once in the MICU patient had good O2 sat on BiPAP with no significant respiratory distress, intubation was not required. We continued treatment for legionella with azithromycin, zosyn was d/c'd once pt was no longer neutropenic s/p neupogen x4. BiPAP was weaned to NC, pt now with O2 sat 100% on RA. Course c/b ISABELLA on CKD, possibly 2/2 [place] vs hemodynamic vs drug induced (filgastim), nephro following no need for HD at this time. Pt medically stabilized for transfer to floors for further evaluation and management.     For f/u:  c/w azithromycin for 7 days   c/w NC, wean as tolerated, bipap at night and PRN  -Will c/w bicarb 650mg TID for ISABELLA on CKD, f/u renal recs   -monitor closely; avoid nephrotoxins & renally dose medications  - f/u nephro recs    Hyacinth Avendano, PGY-3  [id number] MAR TRANSFER NOTE    73 y/o F with PMH of breast CA on chemo (last treatment 8 days ago), CKD, HTN, DMT2 (not on home insulin) presenting s/p syncopal episode who was admitted for neutropenic sepsis 2/2 legionella, then transferred to the MICU on BiPAP for respiratory distress, worsening respiratory alkalosis and concern pt would need to be intubated. Once in the MICU patient had good O2 sat on BiPAP with no significant respiratory distress, intubation was not required. We continued treatment for legionella with azithromycin, zosyn was d/c'd once pt was no longer neutropenic s/p neupogen x4. BiPAP was weaned to NC, pt now with O2 sat 100% on RA. Course c/b ISABELLA on CKD, possibly 2/2 [place] vs hemodynamic vs drug induced (filgastim), nephro following no need for HD at this time. Pt medically stabilized for transfer to floors for further evaluation and management.     For f/u:  c/w azithromycin for 7 days   c/w NC, wean as tolerated  -Will c/w bicarb 650mg TID for ISABELLA on CKD, f/u renal recs   -monitor closely; avoid nephrotoxins & renally dose medications  - f/u nephro recs    Hyacinth Avendano, PGY-3  [id number] MAR TRANSFER NOTE    75 y/o F with PMH of breast CA on chemo (last treatment 8 days ago), CKD, HTN, DMT2 (not on home insulin) presenting s/p syncopal episode who was admitted for neutropenic sepsis 2/2 legionella, then transferred to the MICU on BiPAP for respiratory distress, worsening respiratory alkalosis and concern pt would need to be intubated. Once in the MICU patient had good O2 sat on BiPAP with no significant respiratory distress, intubation was not required. We continued treatment for legionella with azithromycin, zosyn was d/c'd once pt was no longer neutropenic s/p neupogen x4. BiPAP was weaned to NC, pt now with O2 sat 100% on RA. Course c/b ISABELLA on CKD, possibly 2/2 [place] vs hemodynamic vs drug induced (filgastim), nephro following no need for HD at this time. Pt medically stabilized for transfer to floors for further evaluation and management.     For f/u:  c/w azithromycin for 7 days   c/w NC, wean as tolerated  -Will c/w bicarb 650mg TID for ISABELLA on CKD, f/u renal recs     Hyacinth Avendano, PGY-3  [id number]

## 2019-02-11 NOTE — PHYSICAL THERAPY INITIAL EVALUATION ADULT - PATIENT PROFILE REVIEW, REHAB EVAL
yes/No Formal Activity Order in the computer; spoke with RN Kendy Garcia prior to PT evaluation--> pt OK for PT consult/ Increase as tolerated

## 2019-02-11 NOTE — CHART NOTE - NSCHARTNOTEFT_GEN_A_CORE
NUTRITION SERVICES     Upon Nutritional Assessment by the Registered Dietitian your patient was determined to meet criteria/ has evidence of the following diagnosis/diagnoses:  [ ] Mild Protein Calorie Malnutrition   [x ] Moderate Protein Calorie Malnutrition   [ ] Severe Protein Calorie Malnutrition   [ ] Unspecified Protein Calorie Malnutrition   [ ] Underweight / BMI <19  [ ] Morbid Obesity / BMI >40    Findings as based on:  •  Comprehensive nutrition assessment and consultation

## 2019-02-11 NOTE — PROGRESS NOTE ADULT - ASSESSMENT
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     # Sepsis: 2/2 pneumonia  -found to have legionella  -pt admitted to MICU for respiratory distress which has now much improved; pt is off BiPAP and O2. Comfortable on RA  - c/w azithromycin    # Breast cancer:   -TNBC, dx 2018, s/p lumpectomy, s/p 4 cycles of cyclophosphamide/docetaxel  -previous pancytopenia likely due to chemotherapy  -pt is no longer neutropenic, s/p 2 doses of neupogen  -will follow-up with Dr. Ruiz after discharge       Michael Guajardo MD.  Heme-Onc fellow, PGY 4  [phone]; [id number]

## 2019-02-12 LAB
ALBUMIN SERPL ELPH-MCNC: 2.9 G/DL — LOW (ref 3.3–5)
ALP SERPL-CCNC: 144 U/L — HIGH (ref 40–120)
ALT FLD-CCNC: 43 U/L — HIGH (ref 4–33)
ANION GAP SERPL CALC-SCNC: 18 MMO/L — HIGH (ref 7–14)
APTT BLD: 38 SEC — HIGH (ref 27.5–36.3)
AST SERPL-CCNC: 56 U/L — HIGH (ref 4–32)
BASOPHILS # BLD AUTO: 0.06 K/UL — SIGNIFICANT CHANGE UP (ref 0–0.2)
BASOPHILS NFR BLD AUTO: 0.4 % — SIGNIFICANT CHANGE UP (ref 0–2)
BILIRUB SERPL-MCNC: 0.3 MG/DL — SIGNIFICANT CHANGE UP (ref 0.2–1.2)
BUN SERPL-MCNC: 78 MG/DL — HIGH (ref 7–23)
C3 SERPL-MCNC: 145.5 MG/DL — SIGNIFICANT CHANGE UP (ref 90–180)
C4 SERPL-MCNC: 27.2 MG/DL — SIGNIFICANT CHANGE UP (ref 10–40)
CALCIUM SERPL-MCNC: 9.8 MG/DL — SIGNIFICANT CHANGE UP (ref 8.4–10.5)
CHLORIDE SERPL-SCNC: 107 MMOL/L — SIGNIFICANT CHANGE UP (ref 98–107)
CO2 SERPL-SCNC: 13 MMOL/L — LOW (ref 22–31)
CREAT SERPL-MCNC: 3.68 MG/DL — HIGH (ref 0.5–1.3)
EOSINOPHIL # BLD AUTO: 0.01 K/UL — SIGNIFICANT CHANGE UP (ref 0–0.5)
EOSINOPHIL NFR BLD AUTO: 0.1 % — SIGNIFICANT CHANGE UP (ref 0–6)
GLUCOSE BLDC GLUCOMTR-MCNC: 153 MG/DL — HIGH (ref 70–99)
GLUCOSE BLDC GLUCOMTR-MCNC: 202 MG/DL — HIGH (ref 70–99)
GLUCOSE BLDC GLUCOMTR-MCNC: 227 MG/DL — HIGH (ref 70–99)
GLUCOSE BLDC GLUCOMTR-MCNC: 227 MG/DL — HIGH (ref 70–99)
GLUCOSE SERPL-MCNC: 160 MG/DL — HIGH (ref 70–99)
HBV SURFACE AG SER-ACNC: NEGATIVE — SIGNIFICANT CHANGE UP
HCT VFR BLD CALC: 25.6 % — LOW (ref 34.5–45)
HCV AB S/CO SERPL IA: 0.04 S/CO — SIGNIFICANT CHANGE UP
HCV AB SERPL-IMP: SIGNIFICANT CHANGE UP
HGB BLD-MCNC: 8.5 G/DL — LOW (ref 11.5–15.5)
IMM GRANULOCYTES NFR BLD AUTO: 4.7 % — HIGH (ref 0–1.5)
INR BLD: 1.34 — HIGH (ref 0.88–1.17)
LYMPHOCYTES # BLD AUTO: 0.9 K/UL — LOW (ref 1–3.3)
LYMPHOCYTES # BLD AUTO: 5.9 % — LOW (ref 13–44)
MAGNESIUM SERPL-MCNC: 2.6 MG/DL — SIGNIFICANT CHANGE UP (ref 1.6–2.6)
MANUAL SMEAR VERIFICATION: SIGNIFICANT CHANGE UP
MCHC RBC-ENTMCNC: 28.1 PG — SIGNIFICANT CHANGE UP (ref 27–34)
MCHC RBC-ENTMCNC: 33.2 % — SIGNIFICANT CHANGE UP (ref 32–36)
MCV RBC AUTO: 84.8 FL — SIGNIFICANT CHANGE UP (ref 80–100)
MONOCYTES # BLD AUTO: 0.63 K/UL — SIGNIFICANT CHANGE UP (ref 0–0.9)
MONOCYTES NFR BLD AUTO: 4.1 % — SIGNIFICANT CHANGE UP (ref 2–14)
NEUTROPHILS # BLD AUTO: 13.05 K/UL — HIGH (ref 1.8–7.4)
NEUTROPHILS NFR BLD AUTO: 84.8 % — HIGH (ref 43–77)
NRBC # FLD: 0.05 K/UL — LOW (ref 25–125)
PHOSPHATE SERPL-MCNC: 4.4 MG/DL — SIGNIFICANT CHANGE UP (ref 2.5–4.5)
PLATELET # BLD AUTO: 219 K/UL — SIGNIFICANT CHANGE UP (ref 150–400)
PMV BLD: 11.2 FL — SIGNIFICANT CHANGE UP (ref 7–13)
POTASSIUM SERPL-MCNC: 3.5 MMOL/L — SIGNIFICANT CHANGE UP (ref 3.5–5.3)
POTASSIUM SERPL-SCNC: 3.5 MMOL/L — SIGNIFICANT CHANGE UP (ref 3.5–5.3)
PROT SERPL-MCNC: 6.8 G/DL — SIGNIFICANT CHANGE UP (ref 6–8.3)
PROTHROM AB SERPL-ACNC: 15 SEC — HIGH (ref 9.8–13.1)
RBC # BLD: 3.02 M/UL — LOW (ref 3.8–5.2)
RBC # FLD: 20.9 % — HIGH (ref 10.3–14.5)
SODIUM SERPL-SCNC: 138 MMOL/L — SIGNIFICANT CHANGE UP (ref 135–145)
T PALLIDUM AB TITR SER: NEGATIVE — SIGNIFICANT CHANGE UP
WBC # BLD: 15.38 K/UL — HIGH (ref 3.8–10.5)
WBC # FLD AUTO: 15.38 K/UL — HIGH (ref 3.8–10.5)

## 2019-02-12 PROCEDURE — 99233 SBSQ HOSP IP/OBS HIGH 50: CPT

## 2019-02-12 PROCEDURE — 99232 SBSQ HOSP IP/OBS MODERATE 35: CPT

## 2019-02-12 PROCEDURE — 86334 IMMUNOFIX E-PHORESIS SERUM: CPT | Mod: 26

## 2019-02-12 PROCEDURE — 99233 SBSQ HOSP IP/OBS HIGH 50: CPT | Mod: GC

## 2019-02-12 RX ORDER — CHLORHEXIDINE GLUCONATE 213 G/1000ML
1 SOLUTION TOPICAL
Qty: 0 | Refills: 0 | Status: DISCONTINUED | OUTPATIENT
Start: 2019-02-12 | End: 2019-02-20

## 2019-02-12 RX ADMIN — CHLORHEXIDINE GLUCONATE 1 APPLICATION(S): 213 SOLUTION TOPICAL at 11:57

## 2019-02-12 RX ADMIN — Medication 2: at 13:24

## 2019-02-12 RX ADMIN — Medication 650 MILLIGRAM(S): at 06:10

## 2019-02-12 RX ADMIN — Medication 650 MILLIGRAM(S): at 11:57

## 2019-02-12 RX ADMIN — HEPARIN SODIUM 5000 UNIT(S): 5000 INJECTION INTRAVENOUS; SUBCUTANEOUS at 11:57

## 2019-02-12 RX ADMIN — Medication 650 MILLIGRAM(S): at 22:18

## 2019-02-12 RX ADMIN — AZITHROMYCIN 250 MILLIGRAM(S): 500 TABLET, FILM COATED ORAL at 16:59

## 2019-02-12 RX ADMIN — HEPARIN SODIUM 5000 UNIT(S): 5000 INJECTION INTRAVENOUS; SUBCUTANEOUS at 06:10

## 2019-02-12 RX ADMIN — HEPARIN SODIUM 5000 UNIT(S): 5000 INJECTION INTRAVENOUS; SUBCUTANEOUS at 22:18

## 2019-02-12 RX ADMIN — Medication 2: at 18:17

## 2019-02-12 NOTE — PROGRESS NOTE ADULT - PROBLEM SELECTOR PLAN 1
Pt with baseline SCr 1.2-1.6 admitted with elevated SCr to 2.35 (2/5/19). Scr however improved to 1.6 on 2/7/19. Pt had a CT with IV contrast on 2/7/19. Scr elevated to 3.7 (2/10/19). Pt received two doses of Filgrastim during her stay (?GN). She also had one dose of ibruprofen. Pt with possible hemodynamic ISABELLA in the setting of IV contrast, PNA and NSAIDs. Scr elevated to 4.1 (2/11/19).   Scr improved to 3.68 today.   UA with some blood and protein on 2/6. Pt non-oliguric.   Pt with 0.9g proteinuria on spot urine exam.   Check C3, C4, EMELI, ANCA, anti-GBM, dsDNA, HBSAg, HCV Ab, SIFE, RPR, cryoglobulin levels.  Avoid NSAIDs. Pt with baseline SCr 1.2-1.6 admitted with elevated SCr to 2.35 (2/5/19). Scr however improved to 1.6 on 2/7/19. Pt had a CT with IV contrast on 2/7/19. Scr elevated to 3.7 (2/10/19). Pt received two doses of Filgrastim during her stay (?GN). She also had one dose of ibruprofen. Pt with possible hemodynamic ISABELLA in the setting of IV contrast, PNA and NSAIDs. Scr elevated to 4.1 (2/11/19).   Scr improved to 3.68 today. Pt non-oliguric.UA with  blood and protein on 2/6.  Pt with 0.9g proteinuria on spot urine exam. Check C3, C4, EMELI, ANCA, anti-GBM, dsDNA, HBSAg, HCV Ab, SIFE, RPR, cryoglobulin levels. Avoid NSAIDs.

## 2019-02-12 NOTE — PROGRESS NOTE ADULT - PROBLEM SELECTOR PLAN 10
Addended by: RASHAAD ANDERSON on: 8/10/2018 09:19 AM     Modules accepted: Orders     Hx of HTN, on home amlodipine-benazepril 10 mg-40 mg   -BP stable (-140s)  -hold home BP meds in setting of sepsis/volume depletion/syncope  -monitor BP.

## 2019-02-12 NOTE — PROGRESS NOTE ADULT - PROBLEM SELECTOR PLAN 7
-ISABELLA on CKD likely ATN in the setting of sepsis and now contrast exposure   -Cr at admission 2.35, with a baseline of ~1.3  -Increased Cr from yesterday Cr (2.18<-1.62), likely contrast induced   -Continue IVF  -continue to monitor Cr.

## 2019-02-12 NOTE — PROGRESS NOTE ADULT - PROBLEM SELECTOR PLAN 1
-Met sepsis criteria in the ED (T 39.4-39.7 C, -124, RR 20, lactate 2.9), with severe leukopenia (WBC 0.35) and neutropenia (0.01 K/ul), likely secondary to HCAP  -Continues to be febrile  -Source likely PNA in the setting of cough and opacity on CXR  -+urine legionella, continue Zithromax  -Per ID, cont. empiric zosyn until afebrile for 24-48 hr  -Consulted infectious disease, d/raina Vanco and flucanazole  -Blood cx negative at 48hrs.  -stool studies negative thus far  -continued respiratory distress. Last ABG showed good oxygen saturation and low CO2.  -monitor VS q4h.

## 2019-02-12 NOTE — PROGRESS NOTE ADULT - PROBLEM SELECTOR PLAN 5
-chemo induced pancytopenia  -AM labs showed Hb of 9.2, s/p transfused w/ 1 U PRBC last night  -leukopenia and neutropenia resolves (WBC 15.9; Neutrophils 13.55K/ul) s/p neupogen as per Onc  -monitor CBC

## 2019-02-12 NOTE — CHART NOTE - NSCHARTNOTEFT_GEN_A_CORE
Pt is now off O2 and transferred our of MICU. Still on treatement for legionella pna. Leukocytosis likely from neupogen, otherwise her counts are stable. When ready, she can be discharged to follow up with Dr. Ruiz ([phone]) for management for triple negative breast cancer. No indication for inpatient chemo.  Oncology will sign off for now. Please feel free to call, if questions arise.      Michael Guajardo MD.  Heme-Onc fellow, PGY 4  [phone]; [id number]

## 2019-02-12 NOTE — PROGRESS NOTE ADULT - ASSESSMENT
a 73yo F with PMH of breast CA (dx with malignant neoplasm of right female breast and intraductal carcinoma in situ of left breast) on chemo (last treatment 8 days ago), CKD, HTN, DMT2 (not on home insulin) presenting s/p syncopal episode, found to be febrile, hypoxic requiring bipap, febrile to 103, neutropenic  now not neutropenic after Neupogen yesterday.   CT chest negative for PT but shows right upper lobe pneumonia. Initially started on Fluconazole vancomycin, zosyn, azithromycin. blood cx are negative legionella is positive.     Right upper lobe pneumonia   Neutropenic fever (neutropenia now resolved s/p neupogen)      1) Right upper lobe pneumonia:  Urine antigen positive for legionella  Continue azithromycin for 14 days      2) Neutropenia: likely secondary to recent chemotherapy.    Continue to monitor ANC, now over 500    3) Fever: multifactorial; due to pneumonia, complicated by neutropenia  Continue to monitor.  Resolved    4) Diarrhea: GI PCR panel is negative  Legionella can present with GI symptoms

## 2019-02-12 NOTE — PROGRESS NOTE ADULT - SUBJECTIVE AND OBJECTIVE BOX
Follow Up:      Inverval History/ROS:Patient is a 74y old  Female who presents with a chief complaint of Syncopal Episode (2019 12:53)    No fever.   Denies SOB. Still has cough- but better    Allergies    No Known Allergies    Intolerances    codeine (Nausea)      ANTIMICROBIALS:  azithromycin  IVPB 500 every 24 hours  azithromycin  IVPB        OTHER MEDS:  acetaminophen   Tablet .. 650 milliGRAM(s) Oral every 6 hours PRN  chlorhexidine 4% Liquid 1 Application(s) Topical <User Schedule>  dextrose 40% Gel 15 Gram(s) Oral once PRN  dextrose 5%. 1000 milliLiter(s) IV Continuous <Continuous>  dextrose 50% Injectable 12.5 Gram(s) IV Push once  dextrose 50% Injectable 25 Gram(s) IV Push once  dextrose 50% Injectable 25 Gram(s) IV Push once  glucagon  Injectable 1 milliGRAM(s) IntraMuscular once PRN  heparin  Injectable 5000 Unit(s) SubCutaneous every 8 hours  insulin lispro (HumaLOG) corrective regimen sliding scale   SubCutaneous at bedtime  insulin lispro (HumaLOG) corrective regimen sliding scale   SubCutaneous three times a day before meals  sodium bicarbonate 650 milliGRAM(s) Oral three times a day      Vital Signs Last 24 Hrs  T(C): 36.6 (2019 07:57), Max: 36.7 (2019 23:30)  T(F): 97.8 (2019 07:57), Max: 98.1 (2019 23:30)  HR: 87 (2019 07:57) (76 - 92)  BP: 146/78 (2019 07:57) (98/56 - 151/83)  BP(mean): 90 (2019 22:00) (68 - 98)  RR: 20 (2019 07:57) (20 - 36)  SpO2: 100% (2019 07:57) (98% - 100%)    PHYSICAL EXAM:  General: [x ] non-toxic  HEAD/EYES: [ ] PERRL [x ] white sclera [ ] icterus  ENT:  [ ] normal [x ] supple [ ] thrush [ ] pharyngeal exudate  Cardiovascular:   [ ] murmur [ x] normal [ ] PPM/AICD  Respiratory:  x[ ] clear to ausculation bilaterally  GI:  [x ] soft, non-tender, normal bowel sounds  :  [ ] miller [x ] no CVA tenderness   Musculoskeletal:  [ ] no synovitis  Neurologic:  [x ] non-focal exam   Skin:  [ x] no rash  Lymph: [ x] no lymphadenopathy  Psychiatric:  [ ] appropriate affect [ ] alert & oriented  Lines:  [x ] no phlebitis [ ] central line                                8.5    15.38 )-----------( 219      ( 2019 06:15 )             25.6           138  |  107  |  78<H>  ----------------------------<  160<H>  3.5   |  13<L>  |  3.68<H>    Ca    9.8      2019 06:15  Phos  4.4       Mg     2.6         TPro  6.8  /  Alb  2.9<L>  /  TBili  0.3  /  DBili  x   /  AST  56<H>  /  ALT  43<H>  /  AlkPhos  144<H>        Urinalysis Basic - ( 10 Feb 2019 21:10 )    Color: YELLOW / Appearance: Lt TURBID / S.014 / pH: 6.0  Gluc: NEGATIVE / Ketone: NEGATIVE  / Bili: NEGATIVE / Urobili: NORMAL   Blood: MODERATE / Protein: 50 / Nitrite: NEGATIVE   Leuk Esterase: NEGATIVE / RBC: >50 / WBC 3-5   Sq Epi: FEW / Non Sq Epi: x / Bacteria: NEGATIVE        MICROBIOLOGY:    RADIOLOGY:

## 2019-02-12 NOTE — PROGRESS NOTE ADULT - ASSESSMENT
73yo F with PMH of breast CA on chemo (last treatment 8 days ago), CKD, HTN, DMT2 presenting s/p syncopal episode, found to be in neutropenic sepsis (T 39.4-39.7 C, -124, RR 20, lactate 2.9, WBC 0.35, 0.01 K/ul) in setting of recent chemotherapy, likely 2/2 PNA 2/2 legionella, c/b ISABELLA on CKD, having recurrent fevers respiratory distress on BiPAP. 75yo F with PMH of breast CA on chemo (last treatment 8 days ago), CKD, HTN, DMT2 presenting s/p syncopal episode, found to be in neutropenic sepsis (T 39.4-39.7 C, -124, RR 20, lactate 2.9, WBC 0.35, 0.01 K/ul) in setting of recent chemotherapy, likely 2/2 PNA 2/2 legionella, c/b ISABELLA on CKD, having recurrent fevers respiratory distress on BiPAP. 73yo F with PMH of breast CA on chemo (last treatment 8 days ago), CKD, HTN, DMT2 presenting s/p syncopal episode, found to be in neutropenic sepsis (T 39.4-39.7 C, -124, RR 20, lactate 2.9, WBC 0.35, 0.01 K/ul) in setting of recent chemotherapy, likely 2/2 PNA 2/2 legionella, c/b ISABELLA on CKD, having recurrent fevers respiratory distress on BiPAP.  Legionella PNA  multi- focal PNA  and Neutropenia.

## 2019-02-12 NOTE — PROGRESS NOTE ADULT - ATTENDING COMMENTS
Patient transferred from MICU to medicine floos  Flex[facility] in place.  Notes her PCP Dr CLEMENCIA Connolly and Oncology is Dr COLLINS ramírez at [place]  c/w iv abx Patient transferred from MICU to medicine floos  [facility] in place.  Notes her PCP Dr CLEMENCIA Connolly and Oncology is Dr COLLINS ramírez at [place]  c/w iv abx  id f/u appreciated.

## 2019-02-12 NOTE — PROGRESS NOTE ADULT - PROBLEM SELECTOR PLAN 9
-Hx of malignant neoplasm of right female breast and intraductal carcinoma in situ of left breast) on chemo (last treatment 1/29/19, final treatment of cyclophosphamide)  -Spoke with Dr. Ruiz ([phone]), does not come to [facility]  -As per house Onc; will continue Neupogen.

## 2019-02-12 NOTE — PROGRESS NOTE ADULT - SUBJECTIVE AND OBJECTIVE BOX
[facility] DIVISION OF KIDNEY DISEASES AND HYPERTENSION -- FOLLOW UP NOTE  --------------------------------------------------------------------------------    HPI: 75yo F with PMH of breast CA (dx with malignant neoplasm of right female breast and intraductal carcinoma in situ of left breast) on chemo (last treatment 8 days ago), CKD, HTN, DMT2 (not on home insulin) admitted for syncopal episode. Pt found to have legionella PNA requiring bipap. Nephrology consulted for ISABELLA. Pt with baseline SCr 1.2-1.6 admitted with elevated SCr to 2.35 (2/5/19). Scr however improved to 1.6 on 2/7/19. Pt had a CT with IV contrast on 2/7/19. Scr elevated to 3.7 today (2/10/19). Pt received two dose of Filgrastim during her stay (?GN). She also had one dose of ibruprofen. Pt admits to taking NSAIDs daily at home as well.     Pt seen and examined. Pt currently resting.     PAST HISTORY  --------------------------------------------------------------------------------  No significant changes to PMH, PSH, FHx, SHx, unless otherwise noted    ALLERGIES & MEDICATIONS  --------------------------------------------------------------------------------  Allergies    No Known Allergies    Intolerances    codeine (Nausea)    Standing Inpatient Medications  azithromycin  IVPB 500 milliGRAM(s) IV Intermittent every 24 hours  azithromycin  IVPB      chlorhexidine 4% Liquid 1 Application(s) Topical <User Schedule>  dextrose 5%. 1000 milliLiter(s) IV Continuous <Continuous>  dextrose 50% Injectable 12.5 Gram(s) IV Push once  dextrose 50% Injectable 25 Gram(s) IV Push once  dextrose 50% Injectable 25 Gram(s) IV Push once  heparin  Injectable 5000 Unit(s) SubCutaneous every 8 hours  insulin lispro (HumaLOG) corrective regimen sliding scale   SubCutaneous at bedtime  insulin lispro (HumaLOG) corrective regimen sliding scale   SubCutaneous three times a day before meals  sodium bicarbonate 650 milliGRAM(s) Oral three times a day    PRN Inpatient Medications  acetaminophen   Tablet .. 650 milliGRAM(s) Oral every 6 hours PRN  dextrose 40% Gel 15 Gram(s) Oral once PRN  glucagon  Injectable 1 milliGRAM(s) IntraMuscular once PRN      REVIEW OF SYSTEMS  --------------------------------------------------------------------------------  Gen: No weakness  Skin: No rashes  Head/Eyes/Ears/Mouth: No headache  Respiratory: No dyspnea, cough  CV: No chest pain, PND, orthopnea  GI: No abdominal pain, diarrhea, constipation, nausea, vomiting  : No increased frequency, dysuria  MSK: No edema    All other systems were reviewed and are negative, except as noted.    VITALS/PHYSICAL EXAM  --------------------------------------------------------------------------------  T(C): 36.6 (02-12-19 @ 07:57), Max: 36.7 (02-11-19 @ 23:30)  HR: 87 (02-12-19 @ 07:57) (76 - 92)  BP: 146/78 (02-12-19 @ 07:57) (98/56 - 151/83)  RR: 20 (02-12-19 @ 07:57) (20 - 36)  SpO2: 100% (02-12-19 @ 07:57) (98% - 100%)  Wt(kg): --    02-11-19 @ 07:01  -  02-12-19 @ 07:00  --------------------------------------------------------  IN: 550 mL / OUT: 1175 mL / NET: -625 mL      Physical Exam:  	Gen: NAD  	Pulm: CTA B/L  	CV: RRR, S1S2; no rub  	Abd: +BS, soft, nontender/nondistended  	: No suprapubic tenderness  	UE: Warm, no asterixis  	LE: Warm,  no edema  	Psych: Normal affect and mood  	Skin: Warm, without rashes    LABS/STUDIES  --------------------------------------------------------------------------------              8.5    15.38 >-----------<  219      [02-12-19 @ 06:15]              25.6     138  |  107  |  78  ----------------------------<  160      [02-12-19 @ 06:15]  3.5   |  13  |  3.68        Ca     9.8     [02-12-19 @ 06:15]      Mg     2.6     [02-12-19 @ 06:15]      Phos  4.4     [02-12-19 @ 06:15]    TPro  6.8  /  Alb  2.9  /  TBili  0.3  /  DBili  x   /  AST  56  /  ALT  43  /  AlkPhos  144  [02-12-19 @ 06:15]    PT/INR: PT 15.0 , INR 1.34       [02-12-19 @ 09:00]  PTT: 38.0       [02-12-19 @ 09:00]      Creatinine Trend:  SCr 3.68 [02-12 @ 06:15]  SCr 4.10 [02-11 @ 02:35]  SCr 3.79 [02-10 @ 03:10]  SCr 2.18 [02-09 @ 07:21]  SCr 1.62 [02-08 @ 07:00]    Urinalysis - [02-10-19 @ 21:10]      Color YELLOW / Appearance Lt TURBID / SG 1.014 / pH 6.0      Gluc NEGATIVE / Ketone NEGATIVE  / Bili NEGATIVE / Urobili NORMAL       Blood MODERATE / Protein 50 / Leuk Est NEGATIVE / Nitrite NEGATIVE      RBC >50 / WBC 3-5 / Hyaline 2+ / Gran  / Sq Epi FEW / Non Sq Epi  / Bacteria NEGATIVE    Urine Creatinine 55.90      [02-10-19 @ 21:10]  Urine Protein 50.8      [02-10-19 @ 21:10]  Urine Sodium 80      [02-10-19 @ 21:10]  Urine Urea Nitrogen 421.3      [02-06-19 @ 14:50]  Urine Potassium 16.1      [02-10-19 @ 21:10]  Urine Chloride 78      [02-10-19 @ 21:10]  Urine Osmolality 342      [02-06-19 @ 14:50]    Iron 11, TIBC 133, %sat --      [02-06-19 @ 06:01]  Ferritin 1928      [02-06-19 @ 06:01]  HbA1c 7.6      [02-05-19 @ 21:25]    HBsAg NEGATIVE      [02-12-19 @ 06:15]    C3 Complement 145.5      [02-12-19 @ 06:15]  C4 Complement 27.2      [02-12-19 @ 06:15]

## 2019-02-12 NOTE — PROGRESS NOTE ADULT - SUBJECTIVE AND OBJECTIVE BOX
Patient is a 74y old  Female who presents with a chief complaint of Syncopal Episode (2019 13:30)      SUBJECTIVE / OVERNIGHT EVENTS:  Patient transferred from MICU to medicine [facility]  Notes her PCP Dr CLEMENCIA Connolly and Oncology is Dr COLLINS ramírez at [place]    MEDICATIONS  (STANDING):  azithromycin  IVPB 500 milliGRAM(s) IV Intermittent every 24 hours  azithromycin  IVPB      chlorhexidine 4% Liquid 1 Application(s) Topical <User Schedule>  dextrose 5%. 1000 milliLiter(s) (50 mL/Hr) IV Continuous <Continuous>  dextrose 50% Injectable 12.5 Gram(s) IV Push once  dextrose 50% Injectable 25 Gram(s) IV Push once  dextrose 50% Injectable 25 Gram(s) IV Push once  heparin  Injectable 5000 Unit(s) SubCutaneous every 8 hours  insulin lispro (HumaLOG) corrective regimen sliding scale   SubCutaneous at bedtime  insulin lispro (HumaLOG) corrective regimen sliding scale   SubCutaneous three times a day before meals  sodium bicarbonate 650 milliGRAM(s) Oral three times a day    MEDICATIONS  (PRN):  acetaminophen   Tablet .. 650 milliGRAM(s) Oral every 6 hours PRN Mild Pain (1 - 3)  dextrose 40% Gel 15 Gram(s) Oral once PRN Blood Glucose LESS THAN 70 milliGRAM(s)/deciliter  glucagon  Injectable 1 milliGRAM(s) IntraMuscular once PRN Glucose LESS THAN 70 milligrams/deciliter        CAPILLARY BLOOD GLUCOSE      POCT Blood Glucose.: 227 mg/dL (2019 12:48)  POCT Blood Glucose.: 153 mg/dL (2019 08:34)  POCT Blood Glucose.: 193 mg/dL (2019 22:14)  POCT Blood Glucose.: 165 mg/dL (2019 17:38)    I&O's Summary    2019 07:01  -  2019 07:00  --------------------------------------------------------  IN: 550 mL / OUT: 1175 mL / NET: -625 mL        PHYSICAL EXAM:  Vital Signs Last 24 Hrs  T(C): 36.7 (2019 14:38), Max: 36.7 (2019 23:30)  T(F): 98 (2019 14:38), Max: 98.1 (2019 23:30)  HR: 88 (2019 14:38) (77 - 92)  BP: 127/80 (2019 14:38) (98/56 - 151/83)  BP(mean): 90 (2019 22:00) (68 - 98)  RR: 20 (2019 14:38) (20 - 36)  SpO2: 100% (2019 14:38) (98% - 100%)  GENERAL: NAD, well-developed  HEAD:  Atraumatic, Normocephalic  EYES: EOMI, PERRLA, conjunctiva and sclera clear  NECK: Supple, No JVD  CHEST/LUNG: Clear to auscultation bilaterally; No wheeze  HEART: Regular rate and rhythm; No murmurs, rubs, or gallops  ABDOMEN: Soft, Nontender, Nondistended; Bowel sounds present  EXTREMITIES:  2+ Peripheral Pulses, No clubbing, cyanosis, or edema  PSYCH: AAOx3  NEUROLOGY: non-focal  SKIN: No rashes or lesions    LABS:                        8.5    15.38 )-----------( 219      ( 2019 06:15 )             25.6     02-12    138  |  107  |  78<H>  ----------------------------<  160<H>  3.5   |  13<L>  |  3.68<H>    Ca    9.8      2019 06:15  Phos  4.4     02-12  Mg     2.6     02-12    TPro  6.8  /  Alb  2.9<L>  /  TBili  0.3  /  DBili  x   /  AST  56<H>  /  ALT  43<H>  /  AlkPhos  144<H>  02-12    PT/INR - ( 2019 09:00 )   PT: 15.0 SEC;   INR: 1.34          PTT - ( 2019 09:00 )  PTT:38.0 SEC      Urinalysis Basic - ( 10 Feb 2019 21:10 )    Color: YELLOW / Appearance: Lt TURBID / S.014 / pH: 6.0  Gluc: NEGATIVE / Ketone: NEGATIVE  / Bili: NEGATIVE / Urobili: NORMAL   Blood: MODERATE / Protein: 50 / Nitrite: NEGATIVE   Leuk Esterase: NEGATIVE / RBC: >50 / WBC 3-5   Sq Epi: FEW / Non Sq Epi: x / Bacteria: NEGATIVE        RADIOLOGY & ADDITIONAL TESTS:    Imaging Personally Reviewed:    Consultant(s) Notes Reviewed:      Care Discussed with Consultants/Other Providers:

## 2019-02-13 LAB
ALBUMIN SERPL ELPH-MCNC: 3.1 G/DL — LOW (ref 3.3–5)
ALP SERPL-CCNC: 138 U/L — HIGH (ref 40–120)
ALT FLD-CCNC: 45 U/L — HIGH (ref 4–33)
ANION GAP SERPL CALC-SCNC: 17 MMO/L — HIGH (ref 7–14)
ANISOCYTOSIS BLD QL: SLIGHT — SIGNIFICANT CHANGE UP
AST SERPL-CCNC: 57 U/L — HIGH (ref 4–32)
BACTERIA BLD CULT: SIGNIFICANT CHANGE UP
BACTERIA BLD CULT: SIGNIFICANT CHANGE UP
BASOPHILS # BLD AUTO: 0.05 K/UL — SIGNIFICANT CHANGE UP (ref 0–0.2)
BASOPHILS NFR BLD AUTO: 0.3 % — SIGNIFICANT CHANGE UP (ref 0–2)
BASOPHILS NFR SPEC: 0 % — SIGNIFICANT CHANGE UP (ref 0–2)
BILIRUB SERPL-MCNC: 0.3 MG/DL — SIGNIFICANT CHANGE UP (ref 0.2–1.2)
BLASTS # FLD: 0 % — SIGNIFICANT CHANGE UP (ref 0–0)
BUN SERPL-MCNC: 89 MG/DL — HIGH (ref 7–23)
C-ANCA SER-ACNC: NEGATIVE — SIGNIFICANT CHANGE UP
CALCIUM SERPL-MCNC: 9.6 MG/DL — SIGNIFICANT CHANGE UP (ref 8.4–10.5)
CHLORIDE SERPL-SCNC: 104 MMOL/L — SIGNIFICANT CHANGE UP (ref 98–107)
CO2 SERPL-SCNC: 14 MMOL/L — LOW (ref 22–31)
CREAT SERPL-MCNC: 3.23 MG/DL — HIGH (ref 0.5–1.3)
DACRYOCYTES BLD QL SMEAR: SLIGHT — SIGNIFICANT CHANGE UP
DSDNA AB SER-ACNC: <12 IU/ML — SIGNIFICANT CHANGE UP
EOSINOPHIL # BLD AUTO: 0 K/UL — SIGNIFICANT CHANGE UP (ref 0–0.5)
EOSINOPHIL NFR BLD AUTO: 0 % — SIGNIFICANT CHANGE UP (ref 0–6)
EOSINOPHIL NFR FLD: 0 % — SIGNIFICANT CHANGE UP (ref 0–6)
GAS PNL BLDMV: SIGNIFICANT CHANGE UP
GIANT PLATELETS BLD QL SMEAR: PRESENT — SIGNIFICANT CHANGE UP
GLUCOSE BLDC GLUCOMTR-MCNC: 177 MG/DL — HIGH (ref 70–99)
GLUCOSE BLDC GLUCOMTR-MCNC: 221 MG/DL — HIGH (ref 70–99)
GLUCOSE BLDC GLUCOMTR-MCNC: 239 MG/DL — HIGH (ref 70–99)
GLUCOSE BLDC GLUCOMTR-MCNC: 258 MG/DL — HIGH (ref 70–99)
GLUCOSE SERPL-MCNC: 189 MG/DL — HIGH (ref 70–99)
HCT VFR BLD CALC: 24.7 % — LOW (ref 34.5–45)
HGB BLD-MCNC: 8.2 G/DL — LOW (ref 11.5–15.5)
IMM GRANULOCYTES NFR BLD AUTO: 4.8 % — HIGH (ref 0–1.5)
LYMPHOCYTES # BLD AUTO: 1.16 K/UL — SIGNIFICANT CHANGE UP (ref 1–3.3)
LYMPHOCYTES # BLD AUTO: 8.1 % — LOW (ref 13–44)
LYMPHOCYTES NFR SPEC AUTO: 3 % — LOW (ref 13–44)
MCHC RBC-ENTMCNC: 28.5 PG — SIGNIFICANT CHANGE UP (ref 27–34)
MCHC RBC-ENTMCNC: 33.2 % — SIGNIFICANT CHANGE UP (ref 32–36)
MCV RBC AUTO: 85.8 FL — SIGNIFICANT CHANGE UP (ref 80–100)
METAMYELOCYTES # FLD: 1 % — SIGNIFICANT CHANGE UP (ref 0–1)
MICROCYTES BLD QL: SLIGHT — SIGNIFICANT CHANGE UP
MONOCYTES # BLD AUTO: 0.72 K/UL — SIGNIFICANT CHANGE UP (ref 0–0.9)
MONOCYTES NFR BLD AUTO: 5 % — SIGNIFICANT CHANGE UP (ref 2–14)
MONOCYTES NFR BLD: 4 % — SIGNIFICANT CHANGE UP (ref 2–9)
MYELOCYTES NFR BLD: 2 % — HIGH (ref 0–0)
NEUTROPHIL AB SER-ACNC: 88 % — HIGH (ref 43–77)
NEUTROPHILS # BLD AUTO: 11.72 K/UL — HIGH (ref 1.8–7.4)
NEUTROPHILS NFR BLD AUTO: 81.8 % — HIGH (ref 43–77)
NEUTS BAND # BLD: 1 % — SIGNIFICANT CHANGE UP (ref 0–6)
NRBC # BLD: 1 /100WBC — SIGNIFICANT CHANGE UP
NRBC # FLD: 0.05 K/UL — LOW (ref 25–125)
OTHER - HEMATOLOGY %: 0 — SIGNIFICANT CHANGE UP
OVALOCYTES BLD QL SMEAR: SLIGHT — SIGNIFICANT CHANGE UP
P-ANCA SER-ACNC: NEGATIVE — SIGNIFICANT CHANGE UP
PLATELET # BLD AUTO: 251 K/UL — SIGNIFICANT CHANGE UP (ref 150–400)
PLATELET COUNT - ESTIMATE: NORMAL — SIGNIFICANT CHANGE UP
PMV BLD: 10.8 FL — SIGNIFICANT CHANGE UP (ref 7–13)
POIKILOCYTOSIS BLD QL AUTO: SLIGHT — SIGNIFICANT CHANGE UP
POLYCHROMASIA BLD QL SMEAR: SLIGHT — SIGNIFICANT CHANGE UP
POTASSIUM SERPL-MCNC: 3.3 MMOL/L — LOW (ref 3.5–5.3)
POTASSIUM SERPL-SCNC: 3.3 MMOL/L — LOW (ref 3.5–5.3)
PROMYELOCYTES # FLD: 0 % — SIGNIFICANT CHANGE UP (ref 0–0)
PROT SERPL-MCNC: 6.7 G/DL — SIGNIFICANT CHANGE UP (ref 6–8.3)
RBC # BLD: 2.88 M/UL — LOW (ref 3.8–5.2)
RBC # FLD: 21.2 % — HIGH (ref 10.3–14.5)
SMUDGE CELLS # BLD: PRESENT — SIGNIFICANT CHANGE UP
SODIUM SERPL-SCNC: 135 MMOL/L — SIGNIFICANT CHANGE UP (ref 135–145)
VARIANT LYMPHS # BLD: 1 % — SIGNIFICANT CHANGE UP
WBC # BLD: 14.34 K/UL — HIGH (ref 3.8–10.5)
WBC # FLD AUTO: 14.34 K/UL — HIGH (ref 3.8–10.5)

## 2019-02-13 PROCEDURE — 99232 SBSQ HOSP IP/OBS MODERATE 35: CPT

## 2019-02-13 PROCEDURE — 99233 SBSQ HOSP IP/OBS HIGH 50: CPT

## 2019-02-13 PROCEDURE — 99233 SBSQ HOSP IP/OBS HIGH 50: CPT | Mod: GC

## 2019-02-13 RX ORDER — POTASSIUM CHLORIDE 20 MEQ
20 PACKET (EA) ORAL ONCE
Qty: 0 | Refills: 0 | Status: COMPLETED | OUTPATIENT
Start: 2019-02-13 | End: 2019-02-13

## 2019-02-13 RX ADMIN — Medication 650 MILLIGRAM(S): at 05:34

## 2019-02-13 RX ADMIN — Medication 3: at 09:14

## 2019-02-13 RX ADMIN — Medication 650 MILLIGRAM(S): at 21:46

## 2019-02-13 RX ADMIN — Medication 2: at 13:11

## 2019-02-13 RX ADMIN — Medication 650 MILLIGRAM(S): at 13:12

## 2019-02-13 RX ADMIN — Medication 1: at 18:20

## 2019-02-13 RX ADMIN — HEPARIN SODIUM 5000 UNIT(S): 5000 INJECTION INTRAVENOUS; SUBCUTANEOUS at 05:33

## 2019-02-13 RX ADMIN — CHLORHEXIDINE GLUCONATE 1 APPLICATION(S): 213 SOLUTION TOPICAL at 09:14

## 2019-02-13 RX ADMIN — Medication 20 MILLIEQUIVALENT(S): at 09:14

## 2019-02-13 RX ADMIN — AZITHROMYCIN 250 MILLIGRAM(S): 500 TABLET, FILM COATED ORAL at 17:48

## 2019-02-13 RX ADMIN — HEPARIN SODIUM 5000 UNIT(S): 5000 INJECTION INTRAVENOUS; SUBCUTANEOUS at 13:11

## 2019-02-13 RX ADMIN — HEPARIN SODIUM 5000 UNIT(S): 5000 INJECTION INTRAVENOUS; SUBCUTANEOUS at 21:46

## 2019-02-13 NOTE — PROGRESS NOTE ADULT - ATTENDING COMMENTS
ID to f/o PNA  ID to asses liquid stool - ?? infection ID to f/o PNA  ID to asses liquid stool - ?? infection.  Neph consult appreciated.

## 2019-02-13 NOTE — PROGRESS NOTE ADULT - PROBLEM SELECTOR PLAN 1
Pt with baseline SCr 1.2-1.6 admitted with elevated SCr to 2.35 (2/5/19). Scr however improved to 1.6 on 2/7/19. Pt had a CT with IV contrast on 2/7/19. Scr elevated to 3.7 (2/10/19). Pt received two doses of Filgrastim during her stay (?GN). She also had one dose of ibruprofen. Pt with possible hemodynamic ISABELLA in the setting of IV contrast, PNA and NSAIDs. Scr elevated to 4.1 (2/11/19).   Scr improved to 3.23 today.   Pt non-oliguric.  UA with  blood and protein on 2/6.  Pt with 0.9g proteinuria on spot urine exam. Hepatitis C antibody negative. C3 and C4 WNL. Check EMELI, ANCA, anti-GBM, dsDNA, HBSAg, SIFE, RPR, cryoglobulin levels. Avoid NSAIDs.

## 2019-02-13 NOTE — PROGRESS NOTE ADULT - PROBLEM SELECTOR PLAN 2
-RLL opacity on CXR   -POCUS- intermittent B lines at Rt LL, no consolidation   -continue iv azythro for legonella PNA  -Urine legionella positive.  -on BIPAP (IPAP 12, EPAP 5, FiO2 40%)

## 2019-02-13 NOTE — PROGRESS NOTE ADULT - ASSESSMENT
a 73yo F with PMH of breast CA (dx with malignant neoplasm of right female breast and intraductal carcinoma in situ of left breast) on chemo (last treatment 8 days ago), CKD, HTN, DMT2 (not on home insulin) presenting s/p syncopal episode, found to be febrile, hypoxic requiring bipap, febrile to 103, neutropenic  now not neutropenic after Neupogen yesterday.   CT chest negative for PT but shows right upper lobe pneumonia. Initially started on Fluconazole vancomycin, zosyn, azithromycin. blood cx are negative legionella is positive.     1) RUL pneumonia due to legionella  Continue azithromycin through 2/13    2) Leukocytosis multifactorial  resolving infection  s/p neupogen    3) diarrhea  GI pcr is negative  C diff is negative     Doubt infectious diarrhea. No contraindication to cholestyramine

## 2019-02-13 NOTE — PROGRESS NOTE ADULT - PROBLEM SELECTOR PLAN 7
-ISABELLA on CKD likely ATN in the setting of sepsis and now contrast exposure   -Cr at admission 2.35, with a baseline of ~1.3  -Increased Cr from yesterday Cr (2.18<-1.62), likely contrast induced   -Continue IVF  -continue to monitor Cr. -ISABELLA on CKD likely ATN in the setting of sepsis and now contrast exposure   -Cr at admission 2.35, with a baseline of ~1.3  creat 3.2 today  Nephrology consult appreciated- rec   "Pt non-oliguric.  UA with  blood and protein on 2/6.  Pt with 0.9g proteinuria on spot urine exam. Hepatitis C antibody negative. C3 and C4 WNL. Check EMELI, ANCA, anti-GBM, dsDNA, HBSAg, SIFE, RPR, cryoglobulin levels. Avoid NSAIDs."  -Continue IVF  -continue to monitor Cr.

## 2019-02-13 NOTE — PROGRESS NOTE ADULT - SUBJECTIVE AND OBJECTIVE BOX
[facility] DIVISION OF KIDNEY DISEASES AND HYPERTENSION -- FOLLOW UP NOTE  --------------------------------------------------------------------------------    HPI: 75yo F with PMH of breast CA (dx with malignant neoplasm of right female breast and intraductal carcinoma in situ of left breast) on chemo (last treatment 8 days ago), CKD, HTN, DMT2 (not on home insulin) admitted for syncopal episode. Pt found to have legionella PNA requiring bipap. Nephrology consulted for ISABELLA. Pt with baseline SCr 1.2-1.6 admitted with elevated SCr to 2.35 (2/5/19). Scr however improved to 1.6 on 2/7/19. Pt had a CT with IV contrast on 2/7/19. Scr elevated to 3.7 today (2/10/19). Pt received two dose of Filgrastim during her stay (?GN). She also had one dose of ibuprofen Pt admits to taking NSAIDs daily at home as well.    Pt seen and examined. Pt has no complaints      PAST HISTORY  --------------------------------------------------------------------------------  No significant changes to PMH, PSH, FHx, SHx, unless otherwise noted    ALLERGIES & MEDICATIONS  --------------------------------------------------------------------------------  Allergies    No Known Allergies    Intolerances    codeine (Nausea)    Standing Inpatient Medications  azithromycin  IVPB 500 milliGRAM(s) IV Intermittent every 24 hours  azithromycin  IVPB      chlorhexidine 4% Liquid 1 Application(s) Topical <User Schedule>  dextrose 5%. 1000 milliLiter(s) IV Continuous <Continuous>  dextrose 50% Injectable 12.5 Gram(s) IV Push once  dextrose 50% Injectable 25 Gram(s) IV Push once  dextrose 50% Injectable 25 Gram(s) IV Push once  heparin  Injectable 5000 Unit(s) SubCutaneous every 8 hours  insulin lispro (HumaLOG) corrective regimen sliding scale   SubCutaneous at bedtime  insulin lispro (HumaLOG) corrective regimen sliding scale   SubCutaneous three times a day before meals  sodium bicarbonate 650 milliGRAM(s) Oral three times a day    PRN Inpatient Medications  acetaminophen   Tablet .. 650 milliGRAM(s) Oral every 6 hours PRN  dextrose 40% Gel 15 Gram(s) Oral once PRN  glucagon  Injectable 1 milliGRAM(s) IntraMuscular once PRN      REVIEW OF SYSTEMS  --------------------------------------------------------------------------------  Gen: No weakness  Skin: No rashes  Head/Eyes/Ears/Mouth: No headache  Respiratory: No dyspnea, cough  CV: No chest pain, PND, orthopnea  GI: No abdominal pain, diarrhea, constipation, nausea, vomiting  : No increased frequency, dysuria  MSK: No edema    All other systems were reviewed and are negative, except as noted.    VITALS/PHYSICAL EXAM  --------------------------------------------------------------------------------  T(C): 36.9 (02-13-19 @ 05:32), Max: 36.9 (02-13-19 @ 05:32)  HR: 89 (02-13-19 @ 05:32) (88 - 90)  BP: 155/78 (02-13-19 @ 05:32) (127/80 - 168/86)  RR: 19 (02-13-19 @ 05:32) (19 - 20)  SpO2: 100% (02-13-19 @ 05:32) (100% - 100%)  Wt(kg): --    Physical Exam:  	Gen: NAD  	Pulm: CTA B/L  	CV: RRR, S1S2; no rub  	Abd: +BS, soft, nontender/nondistended  	: No suprapubic tenderness  	UE: Warm, no asterixis  	LE: Warm,  no edema  	Psych: Normal affect and mood  	Skin: Warm, without rashes    LABS/STUDIES  --------------------------------------------------------------------------------              8.2    14.34 >-----------<  251      [02-13-19 @ 05:30]              24.7     135  |  104  |  89  ----------------------------<  189      [02-13-19 @ 05:30]  3.3   |  14  |  3.23        Ca     9.6     [02-13-19 @ 05:30]      Mg     2.6     [02-12-19 @ 06:15]      Phos  4.4     [02-12-19 @ 06:15]    TPro  6.7  /  Alb  3.1  /  TBili  0.3  /  DBili  x   /  AST  57  /  ALT  45  /  AlkPhos  138  [02-13-19 @ 05:30]    PT/INR: PT 15.0 , INR 1.34       [02-12-19 @ 09:00]  PTT: 38.0       [02-12-19 @ 09:00]    Creatinine Trend:  SCr 3.23 [02-13 @ 05:30]  SCr 3.68 [02-12 @ 06:15]  SCr 4.10 [02-11 @ 02:35]  SCr 3.79 [02-10 @ 03:10]  SCr 2.18 [02-09 @ 07:21]    Urinalysis - [02-10-19 @ 21:10]      Color YELLOW / Appearance Lt TURBID / SG 1.014 / pH 6.0      Gluc NEGATIVE / Ketone NEGATIVE  / Bili NEGATIVE / Urobili NORMAL       Blood MODERATE / Protein 50 / Leuk Est NEGATIVE / Nitrite NEGATIVE      RBC >50 / WBC 3-5 / Hyaline 2+ / Gran  / Sq Epi FEW / Non Sq Epi  / Bacteria NEGATIVE    Urine Creatinine 55.90      [02-10-19 @ 21:10]  Urine Protein 50.8      [02-10-19 @ 21:10]  Urine Sodium 80      [02-10-19 @ 21:10]  Urine Urea Nitrogen 421.3      [02-06-19 @ 14:50]  Urine Potassium 16.1      [02-10-19 @ 21:10]  Urine Chloride 78      [02-10-19 @ 21:10]  Urine Osmolality 342      [02-06-19 @ 14:50]    Iron 11, TIBC 133, %sat --      [02-06-19 @ 06:01]  Ferritin 1928      [02-06-19 @ 06:01]  HbA1c 7.6      [02-05-19 @ 21:25]    HBsAg NEGATIVE      [02-12-19 @ 06:15]  HCV 0.04, Nonreactive Hepatitis C AB  S/CO Ratio                        Interpretation  < 1.0                                     Non-Reactive  1.0 - 4.9                           Weakly-Reactive  > 5.0                                 Reactive  Non-Reactive: Aperson with a non-reactive HCV antibody  result is considered uninfected.  No further action is  needed unless recent infection is suspected.  In these  cases, consider repeat testing later to detect  seroconversion..  Weakly-Reactive: HCV antibody test is abnormal, HCV RNA  Qualitative test will follow.  Reactive: HCV antibody test is abnormal, HCV RNA  Qualitative test will follow.  Note: HCV antibody testing is performed on the [employer]   system.      [02-12-19 @ 06:15]    C3 Complement 145.5      [02-12-19 @ 06:15]  C4 Complement 27.2      [02-12-19 @ 06:15]  Syphilis Screen (Treponema Pallidum Ab) Negative      [02-12-19 @ 06:15]

## 2019-02-13 NOTE — PROGRESS NOTE ADULT - ASSESSMENT
75yo F with PMH of breast CA on chemo (last treatment 8 days ago), CKD, HTN, DMT2 presenting s/p syncopal episode, found to be in neutropenic sepsis (T 39.4-39.7 C, -124, RR 20, lactate 2.9, WBC 0.35, 0.01 K/ul) in setting of recent chemotherapy, likely 2/2 PNA 2/2 legionella, c/b ISABELLA on CKD, having recurrent fevers respiratory distress on BiPAP.  Transferred from MICU to floors 2/12  Legionella PNA  multi- focal PNA  and Neutropenia.

## 2019-02-13 NOTE — PROGRESS NOTE ADULT - PROBLEM SELECTOR PLAN 3
-likely due to PNA  -no PE on CTA chest   -continue antibiotics as above   -Continue BIPAP for work of breathing. -likely due to PNA  -no PE on CTA chest   -continue antibiotics as above   -R normal- now off Bipap

## 2019-02-13 NOTE — PROGRESS NOTE ADULT - PROBLEM SELECTOR PLAN 1
-Met sepsis criteria in the ED (T 39.4-39.7 C, -124, RR 20, lactate 2.9), with severe leukopenia (WBC 0.35) and neutropenia (0.01 K/ul), likely secondary to HCAP  -Continues to be febrile  -Source likely PNA in the setting of cough and opacity on CXR  -+urine legionella, continue Zithromax  -Per ID, cont. iv Azythro  -Blood cx negative at 48hrs.  -stool studies negative thus far  will ask ID for f/u

## 2019-02-13 NOTE — PROGRESS NOTE ADULT - SUBJECTIVE AND OBJECTIVE BOX
Follow Up:    +++++ Note ewas written on 2/13, not saved into sunrise- re-wrote on 2/14++++++    Inverval History/ROS:Patient is a 74y old  Female who presents with a chief complaint of Syncopal Episode (14 Feb 2019 14:28)  Cough has improved. Less shortness of breath.    Diarrhea.      Allergies    No Known Allergies    Intolerances    codeine (Nausea)      ANTIMICROBIALS:  azithromycin  IVPB 500 every 24 hours  azithromycin  IVPB        OTHER MEDS:  acetaminophen   Tablet .. 650 milliGRAM(s) Oral every 6 hours PRN  chlorhexidine 4% Liquid 1 Application(s) Topical <User Schedule>  dextrose 40% Gel 15 Gram(s) Oral once PRN  dextrose 5%. 1000 milliLiter(s) IV Continuous <Continuous>  dextrose 50% Injectable 12.5 Gram(s) IV Push once  dextrose 50% Injectable 25 Gram(s) IV Push once  dextrose 50% Injectable 25 Gram(s) IV Push once  glucagon  Injectable 1 milliGRAM(s) IntraMuscular once PRN  heparin  Injectable 5000 Unit(s) SubCutaneous every 8 hours  insulin lispro (HumaLOG) corrective regimen sliding scale   SubCutaneous at bedtime  insulin lispro (HumaLOG) corrective regimen sliding scale   SubCutaneous three times a day before meals  sodium bicarbonate 650 milliGRAM(s) Oral three times a day      Vital Signs Last 24 Hrs  T(C): 36.8 (14 Feb 2019 05:18), Max: 36.8 (13 Feb 2019 14:59)  T(F): 98.3 (14 Feb 2019 05:18), Max: 98.3 (14 Feb 2019 05:18)  HR: 93 (14 Feb 2019 12:31) (78 - 93)  BP: 100/72 (14 Feb 2019 12:31) (100/72 - 158/70)  BP(mean): --  RR: 18 (14 Feb 2019 12:31) (18 - 20)  SpO2: 100% (14 Feb 2019 12:31) (98% - 100%)    PHYSICAL EXAM:  General: [x ] non-toxic  HEAD/EYES: [ ] PERRL [x ] white sclera [ ] icterus  ENT:  [ ] normal [x ] supple [ ] thrush [ ] pharyngeal exudate  Cardiovascular:   [ ] murmur [x ] normal [ ] PPM/AICD  Respiratory:  [ x] clear to ausculation bilaterally  GI:  [x ] soft, non-tender, normal bowel sounds  :  [ ] miller [ ]x no CVA tenderness   Musculoskeletal:  [x ] no synovitis  Neurologic:  [ ] non-focal exam   Skin:  [x ] no rash  Lymph: [ ] no lymphadenopathy  Psychiatric:  [ ] appropriate affect [x ] alert & oriented  Lines:  [ x] no phlebitis [ ] central line                                7.9    11.01 )-----------( 274      ( 14 Feb 2019 05:45 )             24.2       02-14    142  |  107  |  94<H>  ----------------------------<  176<H>  3.5   |  15<L>  |  2.83<H>    Ca    9.5      14 Feb 2019 05:45  Phos  4.9     02-14  Mg     2.5     02-14    TPro  7.0  /  Alb  3.2<L>  /  TBili  0.4  /  DBili  x   /  AST  68<H>  /  ALT  55<H>  /  AlkPhos  137<H>  02-14          MICROBIOLOGY:    RADIOLOGY:

## 2019-02-13 NOTE — PROGRESS NOTE ADULT - PROBLEM SELECTOR PLAN 9
-Hx of malignant neoplasm of right female breast and intraductal carcinoma in situ of left breast) on chemo (last treatment 1/29/19, final treatment of cyclophosphamide)  -Spoke with Dr. Ruiz ([phone]), does not come to [facility]  -As per house Onc; will continue Neupogen.

## 2019-02-13 NOTE — PROGRESS NOTE ADULT - SUBJECTIVE AND OBJECTIVE BOX
Patient is a 74y old  Female who presents with a chief complaint of Syncopal Episode (13 Feb 2019 11:57)      SUBJECTIVE / OVERNIGHT EVENTS:  Patient stated she is "feeling better"  Still with liquid stool with flex seal    MEDICATIONS  (STANDING):  azithromycin  IVPB 500 milliGRAM(s) IV Intermittent every 24 hours  azithromycin  IVPB      chlorhexidine 4% Liquid 1 Application(s) Topical <User Schedule>  dextrose 5%. 1000 milliLiter(s) (50 mL/Hr) IV Continuous <Continuous>  dextrose 50% Injectable 12.5 Gram(s) IV Push once  dextrose 50% Injectable 25 Gram(s) IV Push once  dextrose 50% Injectable 25 Gram(s) IV Push once  heparin  Injectable 5000 Unit(s) SubCutaneous every 8 hours  insulin lispro (HumaLOG) corrective regimen sliding scale   SubCutaneous at bedtime  insulin lispro (HumaLOG) corrective regimen sliding scale   SubCutaneous three times a day before meals  sodium bicarbonate 650 milliGRAM(s) Oral three times a day    MEDICATIONS  (PRN):  acetaminophen   Tablet .. 650 milliGRAM(s) Oral every 6 hours PRN Mild Pain (1 - 3)  dextrose 40% Gel 15 Gram(s) Oral once PRN Blood Glucose LESS THAN 70 milliGRAM(s)/deciliter  glucagon  Injectable 1 milliGRAM(s) IntraMuscular once PRN Glucose LESS THAN 70 milligrams/deciliter      POCT Blood Glucose.: 258 mg/dL (13 Feb 2019 08:39)  POCT Blood Glucose.: 227 mg/dL (12 Feb 2019 22:03)  POCT Blood Glucose.: 202 mg/dL (12 Feb 2019 17:54)  POCT Blood Glucose.: 227 mg/dL (12 Feb 2019 12:48)      PHYSICAL EXAM:  Vital Signs Last 24 Hrs  T(C): 36.9 (13 Feb 2019 05:32), Max: 36.9 (13 Feb 2019 05:32)  T(F): 98.4 (13 Feb 2019 05:32), Max: 98.4 (13 Feb 2019 05:32)  HR: 89 (13 Feb 2019 05:32) (88 - 90)  BP: 155/78 (13 Feb 2019 05:32) (127/80 - 168/86)  BP(mean): --  RR: 19 (13 Feb 2019 05:32) (19 - 20)  SpO2: 100% (13 Feb 2019 05:32) (100% - 100%)  GENERAL: NAD, well-developed, bald head  R chest port  HEAD:  Atraumatic, Normocephalic  EYES: EOMI, PERRLA, conjunctiva and sclera clear  NECK: Supple, No JVD  CHEST/LUNG: Clear to auscultation bilaterally; No wheeze  HEART: Regular rate and rhythm; No murmurs, rubs, or gallops  ABDOMEN: Soft, Nontender, Nondistended; Bowel sounds present  EXTREMITIES:  2+ Peripheral Pulses, No clubbing, cyanosis, or edema  PSYCH: AAOx3  NEUROLOGY: non-focal  SKIN: No rashes or lesions  Flex seal in place    LABS:                        8.2    14.34 )-----------( 251      ( 13 Feb 2019 05:30 )             24.7     02-13    135  |  104  |  89<H>  ----------------------------<  189<H>  3.3<L>   |  14<L>  |  3.23<H>    Ca    9.6      13 Feb 2019 05:30  Phos  4.4     02-12  Mg     2.6     02-12    TPro  6.7  /  Alb  3.1<L>  /  TBili  0.3  /  DBili  x   /  AST  57<H>  /  ALT  45<H>  /  AlkPhos  138<H>  02-13    PT/INR - ( 12 Feb 2019 09:00 )   PT: 15.0 SEC;   INR: 1.34          PTT - ( 12 Feb 2019 09:00 )  PTT:38.0 SEC          RADIOLOGY & ADDITIONAL TESTS:    Imaging Personally Reviewed:    Consultant(s) Notes Reviewed:      Care Discussed with Consultants/Other Providers:

## 2019-02-14 LAB
ALBUMIN SERPL ELPH-MCNC: 3.2 G/DL — LOW (ref 3.3–5)
ALP SERPL-CCNC: 137 U/L — HIGH (ref 40–120)
ALT FLD-CCNC: 55 U/L — HIGH (ref 4–33)
ANA TITR SER: NEGATIVE — SIGNIFICANT CHANGE UP
ANION GAP SERPL CALC-SCNC: 20 MMO/L — HIGH (ref 7–14)
AST SERPL-CCNC: 68 U/L — HIGH (ref 4–32)
BASOPHILS # BLD AUTO: 0.04 K/UL — SIGNIFICANT CHANGE UP (ref 0–0.2)
BASOPHILS NFR BLD AUTO: 0.4 % — SIGNIFICANT CHANGE UP (ref 0–2)
BILIRUB SERPL-MCNC: 0.4 MG/DL — SIGNIFICANT CHANGE UP (ref 0.2–1.2)
BUN SERPL-MCNC: 94 MG/DL — HIGH (ref 7–23)
CALCIUM SERPL-MCNC: 9.5 MG/DL — SIGNIFICANT CHANGE UP (ref 8.4–10.5)
CHLORIDE SERPL-SCNC: 107 MMOL/L — SIGNIFICANT CHANGE UP (ref 98–107)
CO2 SERPL-SCNC: 15 MMOL/L — LOW (ref 22–31)
CREAT SERPL-MCNC: 2.83 MG/DL — HIGH (ref 0.5–1.3)
EOSINOPHIL # BLD AUTO: 0.01 K/UL — SIGNIFICANT CHANGE UP (ref 0–0.5)
EOSINOPHIL NFR BLD AUTO: 0.1 % — SIGNIFICANT CHANGE UP (ref 0–6)
GLUCOSE BLDC GLUCOMTR-MCNC: 164 MG/DL — HIGH (ref 70–99)
GLUCOSE BLDC GLUCOMTR-MCNC: 189 MG/DL — HIGH (ref 70–99)
GLUCOSE BLDC GLUCOMTR-MCNC: 192 MG/DL — HIGH (ref 70–99)
GLUCOSE BLDC GLUCOMTR-MCNC: 195 MG/DL — HIGH (ref 70–99)
GLUCOSE SERPL-MCNC: 176 MG/DL — HIGH (ref 70–99)
HCT VFR BLD CALC: 24.2 % — LOW (ref 34.5–45)
HGB BLD-MCNC: 7.9 G/DL — LOW (ref 11.5–15.5)
IMM GRANULOCYTES NFR BLD AUTO: 5.1 % — HIGH (ref 0–1.5)
LYMPHOCYTES # BLD AUTO: 1.08 K/UL — SIGNIFICANT CHANGE UP (ref 1–3.3)
LYMPHOCYTES # BLD AUTO: 9.8 % — LOW (ref 13–44)
MAGNESIUM SERPL-MCNC: 2.5 MG/DL — SIGNIFICANT CHANGE UP (ref 1.6–2.6)
MCHC RBC-ENTMCNC: 28.4 PG — SIGNIFICANT CHANGE UP (ref 27–34)
MCHC RBC-ENTMCNC: 32.6 % — SIGNIFICANT CHANGE UP (ref 32–36)
MCV RBC AUTO: 87.1 FL — SIGNIFICANT CHANGE UP (ref 80–100)
MONOCYTES # BLD AUTO: 0.54 K/UL — SIGNIFICANT CHANGE UP (ref 0–0.9)
MONOCYTES NFR BLD AUTO: 4.9 % — SIGNIFICANT CHANGE UP (ref 2–14)
NEUTROPHILS # BLD AUTO: 8.78 K/UL — HIGH (ref 1.8–7.4)
NEUTROPHILS NFR BLD AUTO: 79.7 % — HIGH (ref 43–77)
NRBC # FLD: 0 K/UL — LOW (ref 25–125)
O+P SPEC CONC: SIGNIFICANT CHANGE UP
OB PNL STL: POSITIVE — SIGNIFICANT CHANGE UP
PHOSPHATE SERPL-MCNC: 4.9 MG/DL — HIGH (ref 2.5–4.5)
PLATELET # BLD AUTO: 274 K/UL — SIGNIFICANT CHANGE UP (ref 150–400)
PMV BLD: 10.4 FL — SIGNIFICANT CHANGE UP (ref 7–13)
POTASSIUM SERPL-MCNC: 3.5 MMOL/L — SIGNIFICANT CHANGE UP (ref 3.5–5.3)
POTASSIUM SERPL-SCNC: 3.5 MMOL/L — SIGNIFICANT CHANGE UP (ref 3.5–5.3)
PROT SERPL-MCNC: 7 G/DL — SIGNIFICANT CHANGE UP (ref 6–8.3)
RBC # BLD: 2.78 M/UL — LOW (ref 3.8–5.2)
RBC # FLD: 21.4 % — HIGH (ref 10.3–14.5)
SODIUM SERPL-SCNC: 142 MMOL/L — SIGNIFICANT CHANGE UP (ref 135–145)
SPECIMEN SOURCE: SIGNIFICANT CHANGE UP
T PALLIDUM AB TITR SER: NEGATIVE — SIGNIFICANT CHANGE UP
WBC # BLD: 11.01 K/UL — HIGH (ref 3.8–10.5)
WBC # FLD AUTO: 11.01 K/UL — HIGH (ref 3.8–10.5)

## 2019-02-14 PROCEDURE — 93306 TTE W/DOPPLER COMPLETE: CPT | Mod: 26

## 2019-02-14 PROCEDURE — 99233 SBSQ HOSP IP/OBS HIGH 50: CPT

## 2019-02-14 PROCEDURE — 99232 SBSQ HOSP IP/OBS MODERATE 35: CPT

## 2019-02-14 PROCEDURE — 99233 SBSQ HOSP IP/OBS HIGH 50: CPT | Mod: GC

## 2019-02-14 RX ORDER — LOPERAMIDE HCL 2 MG
2 TABLET ORAL EVERY 12 HOURS
Qty: 0 | Refills: 0 | Status: COMPLETED | OUTPATIENT
Start: 2019-02-14 | End: 2019-02-16

## 2019-02-14 RX ORDER — SUCRALFATE 1 G
1 TABLET ORAL
Qty: 0 | Refills: 0 | Status: DISCONTINUED | OUTPATIENT
Start: 2019-02-14 | End: 2019-02-19

## 2019-02-14 RX ORDER — SODIUM CHLORIDE 9 MG/ML
1000 INJECTION INTRAMUSCULAR; INTRAVENOUS; SUBCUTANEOUS
Qty: 0 | Refills: 0 | Status: DISCONTINUED | OUTPATIENT
Start: 2019-02-14 | End: 2019-02-16

## 2019-02-14 RX ADMIN — AZITHROMYCIN 250 MILLIGRAM(S): 500 TABLET, FILM COATED ORAL at 18:12

## 2019-02-14 RX ADMIN — Medication 1 GRAM(S): at 18:10

## 2019-02-14 RX ADMIN — Medication 650 MILLIGRAM(S): at 14:39

## 2019-02-14 RX ADMIN — Medication 1: at 12:57

## 2019-02-14 RX ADMIN — Medication 2 MILLIGRAM(S): at 18:10

## 2019-02-14 RX ADMIN — Medication 1: at 09:03

## 2019-02-14 RX ADMIN — HEPARIN SODIUM 5000 UNIT(S): 5000 INJECTION INTRAVENOUS; SUBCUTANEOUS at 14:38

## 2019-02-14 RX ADMIN — HEPARIN SODIUM 5000 UNIT(S): 5000 INJECTION INTRAVENOUS; SUBCUTANEOUS at 05:06

## 2019-02-14 RX ADMIN — Medication 1: at 18:09

## 2019-02-14 RX ADMIN — CHLORHEXIDINE GLUCONATE 1 APPLICATION(S): 213 SOLUTION TOPICAL at 09:03

## 2019-02-14 RX ADMIN — SODIUM CHLORIDE 80 MILLILITER(S): 9 INJECTION INTRAMUSCULAR; INTRAVENOUS; SUBCUTANEOUS at 16:48

## 2019-02-14 RX ADMIN — HEPARIN SODIUM 5000 UNIT(S): 5000 INJECTION INTRAVENOUS; SUBCUTANEOUS at 22:47

## 2019-02-14 RX ADMIN — Medication 650 MILLIGRAM(S): at 05:06

## 2019-02-14 RX ADMIN — Medication 650 MILLIGRAM(S): at 22:48

## 2019-02-14 NOTE — PROGRESS NOTE ADULT - PROBLEM SELECTOR PLAN 1
-Met sepsis criteria in the ED (T 39.4-39.7 C, -124, RR 20, lactate 2.9), with Resolved Sepsis -Met sepsis criteria in the ED (T 39.4-39.7 C, -124, RR 20, lactate 2.9), with Resolved Sepsis  Complete iv Azythro

## 2019-02-14 NOTE — PROGRESS NOTE ADULT - ASSESSMENT
73yo F with PMH of breast CA on chemo (last treatment 8 days ago), CKD, HTN, DMT2 presenting s/p syncopal episode, found to be in neutropenic sepsis (T 39.4-39.7 C, -124, RR 20, lactate 2.9, WBC 0.35, 0.01 K/ul) in setting of recent chemotherapy, likely 2/2 PNA 2/2 legionella, c/b ISABELLA on CKD, having recurrent fevers respiratory distress on BiPAP.  Transferred from MICU to floors 2/12  Legionella PNA  multi- focal PNA  and Neutropenia.  Diiarrhea- ?? Osmotic  Orthostatic BP

## 2019-02-14 NOTE — PROGRESS NOTE ADULT - PROBLEM SELECTOR PLAN 5
-chemo induced pancytopenia  -AM labs showed Hb of 9.2, s/p transfused w/ 1 U PRBC last night  -leukopenia and neutropenia resolves (WBC 15.9; Neutrophils 13.55K/ul) s/p neupogen as per Onc  -monitor CBC Resolved Pancytopenia   Still with Anemia   T/s in Am  Stool Occut

## 2019-02-14 NOTE — PROGRESS NOTE ADULT - PROBLEM SELECTOR PLAN 6
-syncope likely 2/2 orthostatic hypotension/hypovolemia in the setting of sepsis and diarrhea  -CT head neg  -EKG showed sinus tach  -BPs stable with SBP in 130s  -check TTE  -tele monitor. Pos Orthostatic  static BP  Start NS IVF.  Monitor stool occult. t/s in AM  'Gi eval  Am cortisol

## 2019-02-14 NOTE — PROGRESS NOTE ADULT - SUBJECTIVE AND OBJECTIVE BOX
Follow Up:      Inverval History/ROS:Patient is a 74y old  Female who presents with a chief complaint of Syncopal Episode (14 Feb 2019 11:45)  No fever.  SOB/cough are better    loose stool     Allergies    No Known Allergies    Intolerances    codeine (Nausea)      ANTIMICROBIALS:  azithromycin  IVPB 500 every 24 hours  azithromycin  IVPB        OTHER MEDS:  acetaminophen   Tablet .. 650 milliGRAM(s) Oral every 6 hours PRN  chlorhexidine 4% Liquid 1 Application(s) Topical <User Schedule>  dextrose 40% Gel 15 Gram(s) Oral once PRN  dextrose 5%. 1000 milliLiter(s) IV Continuous <Continuous>  dextrose 50% Injectable 12.5 Gram(s) IV Push once  dextrose 50% Injectable 25 Gram(s) IV Push once  dextrose 50% Injectable 25 Gram(s) IV Push once  glucagon  Injectable 1 milliGRAM(s) IntraMuscular once PRN  heparin  Injectable 5000 Unit(s) SubCutaneous every 8 hours  insulin lispro (HumaLOG) corrective regimen sliding scale   SubCutaneous at bedtime  insulin lispro (HumaLOG) corrective regimen sliding scale   SubCutaneous three times a day before meals  sodium bicarbonate 650 milliGRAM(s) Oral three times a day      Vital Signs Last 24 Hrs  T(C): 36.8 (14 Feb 2019 05:18), Max: 36.8 (13 Feb 2019 14:59)  T(F): 98.3 (14 Feb 2019 05:18), Max: 98.3 (14 Feb 2019 05:18)  HR: 93 (14 Feb 2019 12:31) (78 - 93)  BP: 100/72 (14 Feb 2019 12:31) (100/72 - 158/70)  BP(mean): --  RR: 18 (14 Feb 2019 12:31) (18 - 20)  SpO2: 100% (14 Feb 2019 12:31) (98% - 100%)    PHYSICAL EXAM:  General: x[ ] non-toxic  HEAD/EYES: [ ] PERRL [x ] white sclera [ ] icterus  ENT:  [ ] normal [x ] supple [ ] thrush [ ] pharyngeal exudate  Cardiovascular:   [ ] murmur [ x] normal [ ] PPM/AICD  Respiratory:  [ x] clear to ausculation bilaterally  GI:  [ x] soft, non-tender, normal bowel sounds  :  [ ] miller [ ] no CVA tenderness   Musculoskeletal:  [x ] no synovitis  Neurologic:  [x ] non-focal exam   Skin:  [ x] no rash  Lymph: [ ] no lymphadenopathy  Psychiatric:  [ ] appropriate affect [ x] alert & oriented  Lines:  [ x] no phlebitis [ ] central line                                7.9    11.01 )-----------( 274      ( 14 Feb 2019 05:45 )             24.2       02-14    142  |  107  |  94<H>  ----------------------------<  176<H>  3.5   |  15<L>  |  2.83<H>    Ca    9.5      14 Feb 2019 05:45  Phos  4.9     02-14  Mg     2.5     02-14    TPro  7.0  /  Alb  3.2<L>  /  TBili  0.4  /  DBili  x   /  AST  68<H>  /  ALT  55<H>  /  AlkPhos  137<H>  02-14          MICROBIOLOGY:    RADIOLOGY:

## 2019-02-14 NOTE — PROGRESS NOTE ADULT - PROBLEM SELECTOR PLAN 1
Pt with baseline SCr 1.2-1.6 admitted with elevated SCr to 2.35 (2/5/19). Scr however improved to 1.6 on 2/7/19. Pt had a CT with IV contrast on 2/7/19. Scr elevated to 3.7 (2/10/19). Pt received two doses of Filgrastim during her stay (?GN). She also had one dose of ibruprofen. Pt with possible hemodynamic ISABELLA in the setting of IV contrast, PNA and NSAIDs. Scr elevated to 4.1 (2/11/19).   Scr improved to 2.83 today.   Pt non-oliguric.  UA with  blood and protein on 2/6.  Pt with 0.9g proteinuria on spot urine exam. Hepatitis C antibody negative. C3 and C4 WNL. EMELI, RPR, ANCA, anti-GBM, dsDNA, HBSAg are negative. SIFE reveal no monoclonal band. Avoid NSAIDs. Pt with baseline SCr 1.2-1.6 admitted with elevated SCr to 2.35 (2/5/19). Scr however improved to 1.6 on 2/7/19. Pt had a CT with IV contrast on 2/7/19. Scr elevated to 3.7 (2/10/19). Pt received two doses of Filgrastim during her stay (?GN). She also had one dose of ibruprofen. Pt with possible hemodynamic ISABELLA in the setting of IV contrast, PNA and NSAIDs. Scr elevated to 4.1 (2/11/19). Scr improved to 2.83 today. Pt non-oliguric. UA with  blood and protein on 2/6.  Pt with 0.9g proteinuria on spot urine exam. Hepatitis C antibody negative. C3 and C4 WNL. EMELI, RPR, ANCA, anti-GBM, dsDNA, HBSAg are negative. SIFE reveal no monoclonal band. Monitor BMP. Strict I/Os. Avoid nephrotoxins. Renally dose all medications.

## 2019-02-14 NOTE — OCCUPATIONAL THERAPY INITIAL EVALUATION ADULT - PERTINENT HX OF CURRENT PROBLEM, REHAB EVAL
75yo F with PMH of breast CA on chemo (last treatment 8 days ago), CKD, HTN, DMT2 presenting s/p syncopal episode, found to be in neutropenic sepsis in setting of recent chemotherapy, likely 2/2 PNA, c/b ISABELLA on CKD.

## 2019-02-14 NOTE — PROGRESS NOTE ADULT - ASSESSMENT
a 73yo F with PMH of breast CA (dx with malignant neoplasm of right female breast and intraductal carcinoma in situ of left breast) on chemo (last treatment 8 days ago), CKD, HTN, DMT2 (not on home insulin) presenting s/p syncopal episode, found to be febrile, hypoxic requiring bipap, febrile to 103, neutropenic  now not neutropenic after Neupogen yesterday.   CT chest negative for PT but shows right upper lobe pneumonia. Initially started on Fluconazole vancomycin, zosyn, azithromycin. blood cx are negative legionella is positive.     Right upper lobe pneumonia   Neutropenic fever (neutropenia now resolved s/p neupogen)      1) Right upper lobe pneumonia:  Urine antigen positive for legionella  Continue azithromycin for 14 days through 2/23      2) Neutropenia: likely secondary to recent chemotherapy.    Continue to monitor ANC, now over 500    3) Fever: multifactorial; due to pneumonia, complicated by neutropenia  Continue to monitor.  Resolved    4) Diarrhea: GI PCR panel is negative  Legionella can present with GI symptoms  Doubt this is an infectious diarrhea  No objection to cholestyramine

## 2019-02-14 NOTE — PROGRESS NOTE ADULT - SUBJECTIVE AND OBJECTIVE BOX
[facility] DIVISION OF KIDNEY DISEASES AND HYPERTENSION -- FOLLOW UP NOTE  --------------------------------------------------------------------------------  HPI: 73yo F with PMH of breast CA (dx with malignant neoplasm of right female breast and intraductal carcinoma in situ of left breast) on chemo (last treatment 8 days ago), CKD, HTN, DMT2 (not on home insulin) admitted for syncopal episode. Pt found to have legionella PNA requiring bipap. Nephrology consulted for ISABELLA. Pt with baseline SCr 1.2-1.6 admitted with elevated SCr to 2.35 (2/5/19). Scr however improved to 1.6 on 2/7/19. Pt had a CT with IV contrast on 2/7/19. Scr elevated to 3.7 today (2/10/19). Pt received two dose of Filgrastim during her stay (?GN). She also had one dose of ibuprofen Pt admits to taking NSAIDs daily at home as well.    Pt seen and examined. Pt has no complaints    PAST HISTORY  --------------------------------------------------------------------------------  No significant changes to PMH, PSH, FHx, SHx, unless otherwise noted    ALLERGIES & MEDICATIONS  --------------------------------------------------------------------------------  Allergies    No Known Allergies    Intolerances    codeine (Nausea)    Standing Inpatient Medications  azithromycin  IVPB 500 milliGRAM(s) IV Intermittent every 24 hours  azithromycin  IVPB      chlorhexidine 4% Liquid 1 Application(s) Topical <User Schedule>  dextrose 5%. 1000 milliLiter(s) IV Continuous <Continuous>  dextrose 50% Injectable 12.5 Gram(s) IV Push once  dextrose 50% Injectable 25 Gram(s) IV Push once  dextrose 50% Injectable 25 Gram(s) IV Push once  heparin  Injectable 5000 Unit(s) SubCutaneous every 8 hours  insulin lispro (HumaLOG) corrective regimen sliding scale   SubCutaneous at bedtime  insulin lispro (HumaLOG) corrective regimen sliding scale   SubCutaneous three times a day before meals  sodium bicarbonate 650 milliGRAM(s) Oral three times a day    PRN Inpatient Medications  acetaminophen   Tablet .. 650 milliGRAM(s) Oral every 6 hours PRN  dextrose 40% Gel 15 Gram(s) Oral once PRN  glucagon  Injectable 1 milliGRAM(s) IntraMuscular once PRN      REVIEW OF SYSTEMS  --------------------------------------------------------------------------------  Gen: No weakness  Skin: No rashes  Head/Eyes/Ears/Mouth: No headache  Respiratory: No dyspnea, cough  CV: No chest pain, PND, orthopnea  GI: No abdominal pain, diarrhea, constipation, nausea, vomiting  : No increased frequency, dysuria  MSK: No edema  All other systems were reviewed and are negative, except as noted.    VITALS/PHYSICAL EXAM  --------------------------------------------------------------------------------  T(C): 36.8 (02-14-19 @ 05:18), Max: 36.8 (02-13-19 @ 14:59)  HR: 78 (02-14-19 @ 05:18) (78 - 85)  BP: 158/70 (02-14-19 @ 05:18) (135/68 - 158/70)  RR: 18 (02-14-19 @ 05:18) (18 - 20)  SpO2: 100% (02-14-19 @ 05:18) (99% - 100%)  Wt(kg): --    02-13-19 @ 07:01  -  02-14-19 @ 07:00  --------------------------------------------------------  IN: 0 mL / OUT: 200 mL / NET: -200 mL    Physical Exam:  	Gen: NAD  	Pulm: CTA B/L  	CV: RRR, S1S2; no rub  	Abd: +BS, soft, nontender/nondistended  	: No suprapubic tenderness  	UE: Warm, no asterixis  	LE: Warm,  no edema  	Psych: Normal affect and mood  	Skin: Warm, without rashes    LABS/STUDIES  --------------------------------------------------------------------------------              7.9    11.01 >-----------<  274      [02-14-19 @ 05:45]              24.2     142  |  107  |  94  ----------------------------<  176      [02-14-19 @ 05:45]  3.5   |  15  |  2.83        Ca     9.5     [02-14-19 @ 05:45]      Mg     2.5     [02-14-19 @ 05:45]      Phos  4.9     [02-14-19 @ 05:45]    TPro  7.0  /  Alb  3.2  /  TBili  0.4  /  DBili  x   /  AST  68  /  ALT  55  /  AlkPhos  137  [02-14-19 @ 05:45]    Creatinine Trend:  SCr 2.83 [02-14 @ 05:45]  SCr 3.23 [02-13 @ 05:30]  SCr 3.68 [02-12 @ 06:15]  SCr 4.10 [02-11 @ 02:35]  SCr 3.79 [02-10 @ 03:10]    Urinalysis - [02-10-19 @ 21:10]      Color YELLOW / Appearance Lt TURBID / SG 1.014 / pH 6.0      Gluc NEGATIVE / Ketone NEGATIVE  / Bili NEGATIVE / Urobili NORMAL       Blood MODERATE / Protein 50 / Leuk Est NEGATIVE / Nitrite NEGATIVE      RBC >50 / WBC 3-5 / Hyaline 2+ / Gran  / Sq Epi FEW / Non Sq Epi  / Bacteria NEGATIVE    Urine Creatinine 55.90      [02-10-19 @ 21:10]  Urine Protein 50.8      [02-10-19 @ 21:10]  Urine Sodium 80      [02-10-19 @ 21:10]  Urine Potassium 16.1      [02-10-19 @ 21:10]  Urine Chloride 78      [02-10-19 @ 21:10]    Iron 11, TIBC 133, %sat --      [02-06-19 @ 06:01]  Ferritin 1928      [02-06-19 @ 06:01]  HbA1c 7.6      [02-05-19 @ 21:25]    HBsAg NEGATIVE      [02-12-19 @ 06:15]  HCV 0.04, Nonreactive Hepatitis C AB  S/CO Ratio                        Interpretation  < 1.0                                     Non-Reactive  1.0 - 4.9                           Weakly-Reactive  > 5.0                                 Reactive  Non-Reactive: Aperson with a non-reactive HCV antibody  result is considered uninfected.  No further action is  needed unless recent infection is suspected.  In these  cases, consider repeat testing later to detect  seroconversion..  Weakly-Reactive: HCV antibody test is abnormal, HCV RNA  Qualitative test will follow.  Reactive: HCV antibody test is abnormal, HCV RNA  Qualitative test will follow.  Note: HCV antibody testing is performed on the Abbott   system.      [02-12-19 @ 06:15]    EMELI: titer Negative, pattern --      [02-12-19 @ 06:15]  dsDNA <12      [02-12-19 @ 06:15]  C3 Complement 145.5      [02-12-19 @ 06:15]  C4 Complement 27.2      [02-12-19 @ 06:15]  ANCA: cANCA Negative, pANCA Negative, atypical ANCA --      [02-12-19 @ 06:15]  Syphilis Screen (Treponema Pallidum Ab) Negative      [02-12-19 @ 06:15]

## 2019-02-14 NOTE — PROGRESS NOTE ADULT - PROBLEM SELECTOR PLAN 2
-RLL opacity on CXR   -POCUS- intermittent B lines at Rt LL, no consolidation   -continue iv azythro for legonella PNA  -Urine legionella positive.  -on BIPAP (IPAP 12, EPAP 5, FiO2 40%) -RLL opacity on CXR   Legonella PNA- c/w Iv Azythro

## 2019-02-14 NOTE — PROGRESS NOTE ADULT - PROBLEM SELECTOR PLAN 3
-likely due to PNA  -no PE on CTA chest   -continue antibiotics as above   -R normal- now off Bipap -RESOLVED.  Now off bipap

## 2019-02-14 NOTE — PROGRESS NOTE ADULT - PROBLEM SELECTOR PLAN 9
-Hx of malignant neoplasm of right female breast and intraductal carcinoma in situ of left breast) on chemo (last treatment 1/29/19, final treatment of cyclophosphamide)  -Spoke with Dr. Ruiz ([phone]), does not come to [facility]  -As per house Onc; will continue Neupogen.

## 2019-02-14 NOTE — OCCUPATIONAL THERAPY INITIAL EVALUATION ADULT - PLANNED THERAPY INTERVENTIONS, OT EVAL
balance training/ADL retraining/motor coordination training/neuromuscular re-education/strengthening/bed mobility training/parent/caregiver training.../transfer training

## 2019-02-14 NOTE — PROGRESS NOTE ADULT - PROBLEM SELECTOR PLAN 4
-Stool studies negative, neg Cdiff   -monitor bowel movements.   -Continuous watery diarrhea -Stool studies negative, neg Cdiff .  Still liquid stool. IVF .  Stool lytrs/ fat/ giaac and Gi consult  start carafate and imodium

## 2019-02-14 NOTE — PROGRESS NOTE ADULT - PROBLEM SELECTOR PLAN 7
-ISABELLA on CKD likely ATN in the setting of sepsis and now contrast exposure   -Cr at admission 2.35, with a baseline of ~1.3  creat 3.2 today  Nephrology consult appreciated- rec   "Pt non-oliguric.  UA with  blood and protein on 2/6.  Pt with 0.9g proteinuria on spot urine exam. Hepatitis C antibody negative. C3 and C4 WNL. Check EMELI, ANCA, anti-GBM, dsDNA, HBSAg, SIFE, RPR, cryoglobulin levels. Avoid NSAIDs."  -Continue IVF  -continue to monitor Cr.

## 2019-02-14 NOTE — PROGRESS NOTE ADULT - SUBJECTIVE AND OBJECTIVE BOX
Patient is a 74y old  Female who presents with a chief complaint of Syncopal Episode (14 Feb 2019 14:28)      SUBJECTIVE / OVERNIGHT EVENTS:  Patient noted her liquid stool is only since admission  Still with flex seal  Nurse noted she had orthostatic BP    MEDICATIONS  (STANDING):  azithromycin  IVPB 500 milliGRAM(s) IV Intermittent every 24 hours  azithromycin  IVPB      chlorhexidine 4% Liquid 1 Application(s) Topical <User Schedule>  dextrose 5%. 1000 milliLiter(s) (50 mL/Hr) IV Continuous <Continuous>  dextrose 50% Injectable 12.5 Gram(s) IV Push once  dextrose 50% Injectable 25 Gram(s) IV Push once  dextrose 50% Injectable 25 Gram(s) IV Push once  heparin  Injectable 5000 Unit(s) SubCutaneous every 8 hours  insulin lispro (HumaLOG) corrective regimen sliding scale   SubCutaneous at bedtime  insulin lispro (HumaLOG) corrective regimen sliding scale   SubCutaneous three times a day before meals  sodium bicarbonate 650 milliGRAM(s) Oral three times a day    MEDICATIONS  (PRN):  acetaminophen   Tablet .. 650 milliGRAM(s) Oral every 6 hours PRN Mild Pain (1 - 3)  dextrose 40% Gel 15 Gram(s) Oral once PRN Blood Glucose LESS THAN 70 milliGRAM(s)/deciliter  glucagon  Injectable 1 milliGRAM(s) IntraMuscular once PRN Glucose LESS THAN 70 milligrams/deciliter        CAPILLARY BLOOD GLUCOSE      POCT Blood Glucose.: 189 mg/dL (14 Feb 2019 12:29)  POCT Blood Glucose.: 164 mg/dL (14 Feb 2019 08:37)  POCT Blood Glucose.: 221 mg/dL (13 Feb 2019 22:55)  POCT Blood Glucose.: 177 mg/dL (13 Feb 2019 18:10)    I&O's Summary    13 Feb 2019 07:01  -  14 Feb 2019 07:00  --------------------------------------------------------  IN: 0 mL / OUT: 200 mL / NET: -200 mL        PHYSICAL EXAM:  Vital Signs Last 24 Hrs  T(C): 36.6 (14 Feb 2019 15:04), Max: 36.8 (14 Feb 2019 05:18)  T(F): 97.9 (14 Feb 2019 15:04), Max: 98.3 (14 Feb 2019 05:18)  HR: 83 (14 Feb 2019 15:04) (78 - 93)  BP: 169/72 (14 Feb 2019 15:04) (100/72 - 169/72)  BP(mean): --  RR: 18 (14 Feb 2019 15:04) (18 - 19)  SpO2: 100% (14 Feb 2019 15:04) (98% - 100%)  GENERAL: NAD, well-developed  R chest port  HEAD:  Atraumatic, Normocephalic  EYES: EOMI, PERRLA, conjunctiva and sclera clear  NECK: Supple, No JVD  CHEST/LUNG: Clear to auscultation bilaterally; No wheeze  HEART: Regular rate and rhythm; No murmurs, rubs, or gallops  ABDOMEN: Soft, Nontender, Nondistended; Bowel sounds present  EXTREMITIES:  2+ Peripheral Pulses, No clubbing, cyanosis, or edema  PSYCH: AAOx3  NEUROLOGY: non-focal  SKIN: No rashes or lesions  flex sig    LABS:                        7.9    11.01 )-----------( 274      ( 14 Feb 2019 05:45 )             24.2     02-14    142  |  107  |  94<H>  ----------------------------<  176<H>  3.5   |  15<L>  |  2.83<H>    Ca    9.5      14 Feb 2019 05:45  Phos  4.9     02-14  Mg     2.5     02-14    TPro  7.0  /  Alb  3.2<L>  /  TBili  0.4  /  DBili  x   /  AST  68<H>  /  ALT  55<H>  /  AlkPhos  137<H>  02-14        RADIOLOGY & ADDITIONAL TESTS:    Imaging Personally Reviewed:    Consultant(s) Notes Reviewed:      Care Discussed with Consultants/Other Providers:  ID

## 2019-02-14 NOTE — PROGRESS NOTE ADULT - PROBLEM SELECTOR PLAN 10
Hx of HTN, on home amlodipine-benazepril 10 mg-40 mg   -BP stable (-140s)  -hold home BP meds in setting of sepsis/volume depletion/syncope  -monitor BP. Orthostatic BP  IVF hydration.  Monitor

## 2019-02-15 DIAGNOSIS — M10.9 GOUT, UNSPECIFIED: ICD-10-CM

## 2019-02-15 LAB
ALBUMIN SERPL ELPH-MCNC: 2.9 G/DL — LOW (ref 3.3–5)
ALP SERPL-CCNC: 123 U/L — HIGH (ref 40–120)
ALT FLD-CCNC: 43 U/L — HIGH (ref 4–33)
ANION GAP SERPL CALC-SCNC: 16 MMO/L — HIGH (ref 7–14)
ANION GAP SERPL CALC-SCNC: 16 MMO/L — HIGH (ref 7–14)
AST SERPL-CCNC: 38 U/L — HIGH (ref 4–32)
BASOPHILS # BLD AUTO: 0.03 K/UL — SIGNIFICANT CHANGE UP (ref 0–0.2)
BASOPHILS NFR BLD AUTO: 0.3 % — SIGNIFICANT CHANGE UP (ref 0–2)
BILIRUB SERPL-MCNC: 0.3 MG/DL — SIGNIFICANT CHANGE UP (ref 0.2–1.2)
BLD GP AB SCN SERPL QL: NEGATIVE — SIGNIFICANT CHANGE UP
BUN SERPL-MCNC: 86 MG/DL — HIGH (ref 7–23)
BUN SERPL-MCNC: 86 MG/DL — HIGH (ref 7–23)
CALCIUM SERPL-MCNC: 9.4 MG/DL — SIGNIFICANT CHANGE UP (ref 8.4–10.5)
CALCIUM SERPL-MCNC: 9.4 MG/DL — SIGNIFICANT CHANGE UP (ref 8.4–10.5)
CHLORIDE SERPL-SCNC: 108 MMOL/L — HIGH (ref 98–107)
CHLORIDE SERPL-SCNC: 108 MMOL/L — HIGH (ref 98–107)
CO2 SERPL-SCNC: 17 MMOL/L — LOW (ref 22–31)
CO2 SERPL-SCNC: 17 MMOL/L — LOW (ref 22–31)
CORTIS SERPL-MCNC: 15 UG/DL — SIGNIFICANT CHANGE UP (ref 2.7–18.4)
CREAT SERPL-MCNC: 2.36 MG/DL — HIGH (ref 0.5–1.3)
CREAT SERPL-MCNC: 2.36 MG/DL — HIGH (ref 0.5–1.3)
EOSINOPHIL # BLD AUTO: 0.01 K/UL — SIGNIFICANT CHANGE UP (ref 0–0.5)
EOSINOPHIL NFR BLD AUTO: 0.1 % — SIGNIFICANT CHANGE UP (ref 0–6)
GBM IGG SER-ACNC: <1 — SIGNIFICANT CHANGE UP
GLUCOSE BLDC GLUCOMTR-MCNC: 165 MG/DL — HIGH (ref 70–99)
GLUCOSE BLDC GLUCOMTR-MCNC: 170 MG/DL — HIGH (ref 70–99)
GLUCOSE BLDC GLUCOMTR-MCNC: 205 MG/DL — HIGH (ref 70–99)
GLUCOSE BLDC GLUCOMTR-MCNC: 288 MG/DL — HIGH (ref 70–99)
GLUCOSE SERPL-MCNC: 170 MG/DL — HIGH (ref 70–99)
GLUCOSE SERPL-MCNC: 170 MG/DL — HIGH (ref 70–99)
HCT VFR BLD CALC: 22.8 % — LOW (ref 34.5–45)
HGB BLD-MCNC: 7.4 G/DL — LOW (ref 11.5–15.5)
IMM GRANULOCYTES NFR BLD AUTO: 3.2 % — HIGH (ref 0–1.5)
LYMPHOCYTES # BLD AUTO: 0.91 K/UL — LOW (ref 1–3.3)
LYMPHOCYTES # BLD AUTO: 9.2 % — LOW (ref 13–44)
MAGNESIUM SERPL-MCNC: 2.1 MG/DL — SIGNIFICANT CHANGE UP (ref 1.6–2.6)
MCHC RBC-ENTMCNC: 28.2 PG — SIGNIFICANT CHANGE UP (ref 27–34)
MCHC RBC-ENTMCNC: 32.5 % — SIGNIFICANT CHANGE UP (ref 32–36)
MCV RBC AUTO: 87 FL — SIGNIFICANT CHANGE UP (ref 80–100)
MONOCYTES # BLD AUTO: 0.6 K/UL — SIGNIFICANT CHANGE UP (ref 0–0.9)
MONOCYTES NFR BLD AUTO: 6 % — SIGNIFICANT CHANGE UP (ref 2–14)
NEUTROPHILS # BLD AUTO: 8.06 K/UL — HIGH (ref 1.8–7.4)
NEUTROPHILS NFR BLD AUTO: 81.2 % — HIGH (ref 43–77)
NRBC # FLD: 0 K/UL — LOW (ref 25–125)
PHOSPHATE SERPL-MCNC: 4.3 MG/DL — SIGNIFICANT CHANGE UP (ref 2.5–4.5)
PLATELET # BLD AUTO: 308 K/UL — SIGNIFICANT CHANGE UP (ref 150–400)
PMV BLD: 10.6 FL — SIGNIFICANT CHANGE UP (ref 7–13)
POTASSIUM SERPL-MCNC: 3.3 MMOL/L — LOW (ref 3.5–5.3)
POTASSIUM SERPL-MCNC: 3.3 MMOL/L — LOW (ref 3.5–5.3)
POTASSIUM SERPL-SCNC: 3.3 MMOL/L — LOW (ref 3.5–5.3)
POTASSIUM SERPL-SCNC: 3.3 MMOL/L — LOW (ref 3.5–5.3)
PROT SERPL-MCNC: 6.5 G/DL — SIGNIFICANT CHANGE UP (ref 6–8.3)
RBC # BLD: 2.62 M/UL — LOW (ref 3.8–5.2)
RBC # FLD: 21.4 % — HIGH (ref 10.3–14.5)
RH IG SCN BLD-IMP: POSITIVE — SIGNIFICANT CHANGE UP
SODIUM SERPL-SCNC: 141 MMOL/L — SIGNIFICANT CHANGE UP (ref 135–145)
SODIUM SERPL-SCNC: 141 MMOL/L — SIGNIFICANT CHANGE UP (ref 135–145)
TRI STN SPEC: SIGNIFICANT CHANGE UP
WBC # BLD: 9.93 K/UL — SIGNIFICANT CHANGE UP (ref 3.8–10.5)
WBC # FLD AUTO: 9.93 K/UL — SIGNIFICANT CHANGE UP (ref 3.8–10.5)

## 2019-02-15 PROCEDURE — 99233 SBSQ HOSP IP/OBS HIGH 50: CPT

## 2019-02-15 PROCEDURE — 99232 SBSQ HOSP IP/OBS MODERATE 35: CPT

## 2019-02-15 PROCEDURE — 99222 1ST HOSP IP/OBS MODERATE 55: CPT | Mod: GC

## 2019-02-15 RX ORDER — POTASSIUM CHLORIDE 20 MEQ
40 PACKET (EA) ORAL ONCE
Qty: 0 | Refills: 0 | Status: COMPLETED | OUTPATIENT
Start: 2019-02-15 | End: 2019-02-15

## 2019-02-15 RX ORDER — ERYTHROPOIETIN 10000 [IU]/ML
10000 INJECTION, SOLUTION INTRAVENOUS; SUBCUTANEOUS
Qty: 0 | Refills: 0 | Status: DISCONTINUED | OUTPATIENT
Start: 2019-02-15 | End: 2019-02-20

## 2019-02-15 RX ADMIN — Medication 1 GRAM(S): at 11:34

## 2019-02-15 RX ADMIN — Medication 40 MILLIEQUIVALENT(S): at 11:34

## 2019-02-15 RX ADMIN — Medication 1: at 09:05

## 2019-02-15 RX ADMIN — Medication 1 GRAM(S): at 23:56

## 2019-02-15 RX ADMIN — Medication 650 MILLIGRAM(S): at 22:14

## 2019-02-15 RX ADMIN — Medication 2 MILLIGRAM(S): at 17:39

## 2019-02-15 RX ADMIN — Medication 1 GRAM(S): at 05:45

## 2019-02-15 RX ADMIN — HEPARIN SODIUM 5000 UNIT(S): 5000 INJECTION INTRAVENOUS; SUBCUTANEOUS at 05:46

## 2019-02-15 RX ADMIN — Medication 2 MILLIGRAM(S): at 05:45

## 2019-02-15 RX ADMIN — Medication 3: at 12:39

## 2019-02-15 RX ADMIN — HEPARIN SODIUM 5000 UNIT(S): 5000 INJECTION INTRAVENOUS; SUBCUTANEOUS at 12:39

## 2019-02-15 RX ADMIN — Medication 650 MILLIGRAM(S): at 12:39

## 2019-02-15 RX ADMIN — Medication 1: at 17:53

## 2019-02-15 RX ADMIN — HEPARIN SODIUM 5000 UNIT(S): 5000 INJECTION INTRAVENOUS; SUBCUTANEOUS at 22:14

## 2019-02-15 RX ADMIN — CHLORHEXIDINE GLUCONATE 1 APPLICATION(S): 213 SOLUTION TOPICAL at 09:05

## 2019-02-15 RX ADMIN — Medication 650 MILLIGRAM(S): at 05:45

## 2019-02-15 RX ADMIN — Medication 1 GRAM(S): at 17:39

## 2019-02-15 NOTE — CONSULT NOTE ADULT - SUBJECTIVE AND OBJECTIVE BOX
HPI:  Pt is a 75yo F with PMH of breast CA (dx with malignant neoplasm of right female breast and intraductal carcinoma in situ of left breast) on chemo (last treatment 8 days PTA; completed 4 cycles), CKD, HTN, DMT2 (not on home insulin) presenting s/p syncopal episode. Pt was found down and disorientated on the day of admission by her daughter covered in feces, requiring assistance to stand up. Daughter denied abnormal movements, unknown if urinary incontinence, denied tongue bitting. The pt does not remember the events surrounding the fall, although believes she was probably getting out of bed based on location. When EMS arrived, she was hypoxic with SpO2 of 82% and A+Ox2. She has had 4 episodes of watery diarrhea, decreased appetite, and chills starting 1 day PTA. She has also had a lingering dry cough for the past couple of days. Denied fevers, dizziness, blurry vision, congestion, SOB, chest pain, abdominal pain, n/v/c, muscle weakness, pain.     In the ED: Pt was febrile (39.4-39.7 C), tachycardic (111-124), RR of 20, and normotensive (SBP 140s). Labs showed severe leukopenia (WBC 0.36), with neutropenia (0.01 K/ul), anemia, ISABELLA, high lactate (2.9). She was given 2L NS, cefepime vanco, APAP and ibuprofen. (05 Feb 2019 15:44)    She was admitted to MICU. Because of low O2 sat, she was treated with CPAP, CTA of chest 2/7/2019 showed RUL density w air bronchogram, urine was pos foe legionella. Seen by ID Antibiotic was switched to Azithromycin. Has experienced diarrhea; stool studies was neg for C. difficile    Noted to be pancytopenic. Seen by house ID , she was started on Neupogen for neutropenia and given transfusion for anemia.    REVIEW OF SYSTEMS:    CONSTITUTIONAL: still weakness (feels stronger since admission), no fevers or chills, weight loss  EYES/ENT: No visual changes, no throat pain   RESPIRATORY: No cough, wheezing, hemoptysis; No shortness of breath  CARDIOVASCULAR: No chest pain or palpitations  GASTROINTESTINAL: No abdominal, nausea, vomiting, or hematemesis; pos diarrhea or constipation. No melena or hematochezia.  GENITOURINARY: No dysuria, frequency or hematuria  NEUROLOGICAL: No dizziness, numbness, or weakness  SKIN: No itching, burning, rashes, or lesions   All other review of systems is negative unless indicated above.      Past Medical History:  Carpal tunnel syndrome on left    Cataract  bilateral eyes  CKD (chronic kidney disease)    CKD (chronic kidney disease) stage 3, GFR 30-59 ml/min    Diabetes mellitus, type 2    Gout    Hypertension    Intraductal carcinoma in situ of left breast    Malignant neoplasm of right breast    Ovarian cyst  right  Primary osteoarthritis of left knee    Primary osteoarthritis of right knee    Spinal stenosis of lumbosacral region.       Past Surgical History:  History of right salpingo-oophorectomy  1994  Lipoma of chest wall  sx removal  Lipoma of neck  sx removal  S/P appendectomy  1994  S/P carpal tunnel release  left hand- 2007  S/P total knee arthroplasty, right  9/12/18.     Family History:  No pertinent family history in first degree relatives.     No Pertinent Family History in first degree relatives of: No h/o breast cancer.     Social History:  Social History (marital status, living situation, occupation, tobacco use, alcohol and drug use, and sexual history): Pt lives at a private residence with her daughters. Denied hx of smoking/EtOH use. Functionally independent, uses cane when ambulating outside the home.	        VITAL SIGNS:    T 97.8, P94, /87m R 20    PHYSICAL EXAM:     GENERAL: no acute distress  HEENT: NC/AT, EOMI, neck supple, MMM  RESPIRATORY: LCTAB/L, slightly rhonchi, rales, or wheezing  CARDIOVASCULAR: RRR, no murmurs, gallops, rubs  ABDOMINAL: soft, non-tender, non-distended, positive bowel sounds   EXTREMITIES: no clubbing, cyanosis, or edema  NEUROLOGICAL: alert and oriented x 3, non-focal  LYMPHATIC: lymphatic: cervical, supraclavicular, axilla, inguinal  SKIN: no rashes or lesions   MUSCULOSKELETAL: no gross joint deformity                          7.4    9.93  )-----------( 308      ( 15 Feb 2019 05:44 )             22.8     02-15    141  |  108<H>  |  86<H>  ----------------------------<  170<H>  3.3<L>   |  17<L>  |  2.36<H>    Ca    9.4      15 Feb 2019 05:44  Phos  4.3     02-15  Mg     2.1     02-15    TPro  6.5  /  Alb  2.9<L>  /  TBili  0.3  /  DBili  x   /  AST  38<H>  /  ALT  43<H>  /  AlkPhos  123<H>  02-15      MEDICATIONS  (STANDING):  chlorhexidine 4% Liquid 1 Application(s) Topical <User Schedule>  dextrose 5%. 1000 milliLiter(s) (50 mL/Hr) IV Continuous <Continuous>  dextrose 50% Injectable 12.5 Gram(s) IV Push once  dextrose 50% Injectable 25 Gram(s) IV Push once  dextrose 50% Injectable 25 Gram(s) IV Push once  epoetin diogo Injectable 95168 Unit(s) SubCutaneous <User Schedule>  heparin  Injectable 5000 Unit(s) SubCutaneous every 8 hours  insulin lispro (HumaLOG) corrective regimen sliding scale   SubCutaneous at bedtime  insulin lispro (HumaLOG) corrective regimen sliding scale   SubCutaneous three times a day before meals  loperamide 2 milliGRAM(s) Oral every 12 hours  sodium bicarbonate 650 milliGRAM(s) Oral three times a day  sodium chloride 0.9%. 1000 milliLiter(s) (80 mL/Hr) IV Continuous <Continuous>  sucralfate 1 Gram(s) Oral four times a day

## 2019-02-15 NOTE — PROGRESS NOTE ADULT - SUBJECTIVE AND OBJECTIVE BOX
[facility] DIVISION OF KIDNEY DISEASES AND HYPERTENSION -- PROGRESS NOTE    Chief complaint: ISABELLA    24 hour events/subjective: feels better        PAST HISTORY  --------------------------------------------------------------------------------  No significant changes to PMH, PSH, FHx, SHx, unless otherwise noted    ALLERGIES & MEDICATIONS  --------------------------------------------------------------------------------  Allergies    No Known Allergies    Intolerances    codeine (Nausea)    Standing Inpatient Medications  azithromycin  IVPB 500 milliGRAM(s) IV Intermittent every 24 hours  azithromycin  IVPB      chlorhexidine 4% Liquid 1 Application(s) Topical <User Schedule>  dextrose 5%. 1000 milliLiter(s) IV Continuous <Continuous>  dextrose 50% Injectable 12.5 Gram(s) IV Push once  dextrose 50% Injectable 25 Gram(s) IV Push once  dextrose 50% Injectable 25 Gram(s) IV Push once  heparin  Injectable 5000 Unit(s) SubCutaneous every 8 hours  insulin lispro (HumaLOG) corrective regimen sliding scale   SubCutaneous at bedtime  insulin lispro (HumaLOG) corrective regimen sliding scale   SubCutaneous three times a day before meals  loperamide 2 milliGRAM(s) Oral every 12 hours  sodium bicarbonate 650 milliGRAM(s) Oral three times a day  sodium chloride 0.9%. 1000 milliLiter(s) IV Continuous <Continuous>  sucralfate 1 Gram(s) Oral four times a day    PRN Inpatient Medications  acetaminophen   Tablet .. 650 milliGRAM(s) Oral every 6 hours PRN  dextrose 40% Gel 15 Gram(s) Oral once PRN  glucagon  Injectable 1 milliGRAM(s) IntraMuscular once PRN      REVIEW OF SYSTEMS  --------------------------------------------------------------------------------  Constitutional: [ ] Fever [ ] Chills [ ] Fatigue [ ] Weight change   HEENT: [ ] Blurred vision [ ] Eye Pain [ ] Headache [ ] Runny nose [ ] Sore Throat   Respiratory: [ ] Cough [ ] Wheezing [ ] Shortness of breath  Cardiovascular: [ ] Chest Pain [ ] Palpitations [ ] BOYD [ ] PND [ ] Orthopnea  Gastrointestinal: [ ] Abdominal Pain [ ] Diarrhea [ ] Constipation [ ] Hemorrhoids [ ] Nausea [ ] Vomiting  Genitourinary: [ ] Nocturia [ ] Dysuria [ ] Incontinence  Extremities: [ ] Swelling [ ] Joint Pain  Neurologic: [ ] Focal deficit [ ] Paresthesias [ ] Syncope  Lymphatic: [ ] Swelling [ ] Lymphadenopathy   Skin: [ ] Rash [ ] Ecchymoses [ ] Wounds [ ] Lesions  Psychiatry: [ ] Depression [ ] Suicidal/Homicidal Ideation [ ] Anxiety [ ] Sleep Disturbances   X 10 point review of systems is otherwise negative except as mentioned above              [ ]Unable to obtain due to   All other systems were reviewed and are negative, except as noted.    VITALS/PHYSICAL EXAM  --------------------------------------------------------------------------------  T(C): 37.2 (02-15-19 @ 05:45), Max: 37.2 (02-15-19 @ 05:45)  HR: 87 (02-15-19 @ 05:45) (83 - 95)  BP: 149/72 (02-15-19 @ 05:45) (149/72 - 169/72)  RR: 20 (02-15-19 @ 05:45) (18 - 20)  SpO2: 99% (02-15-19 @ 05:45) (99% - 100%)  Wt(kg): --        Physical Exam:  	Gen: NAD, well-appearing  	HEENT: on room air  	Pulm: CTA B/L  	CV: normal S1S2; no rub  	Abd: soft                      Back : No sacral edema  	: No miller  	LE: no edema  	Skin: Warm, without rashes  	    LABS/STUDIES  --------------------------------------------------------------------------------              7.4    9.93  >-----------<  308      [02-15-19 @ 05:44]              22.8     141  |  108  |  86  ----------------------------<  170      [02-15-19 @ 05:44]  3.3   |  17  |  2.36        Ca     9.4     [02-15-19 @ 05:44]      Mg     2.1     [02-15-19 @ 05:44]      Phos  4.3     [02-15-19 @ 05:44]    TPro  6.5  /  Alb  2.9  /  TBili  0.3  /  DBili  x   /  AST  38  /  ALT  43  /  AlkPhos  123  [02-15-19 @ 05:44]          Creatinine Trend:  SCr 2.36 [02-15 @ 05:44]  SCr 2.83 [02-14 @ 05:45]  SCr 3.23 [02-13 @ 05:30]  SCr 3.68 [02-12 @ 06:15]  SCr 4.10 [02-11 @ 02:35]    Urinalysis - [02-10-19 @ 21:10]      Color YELLOW / Appearance Lt TURBID / SG 1.014 / pH 6.0      Gluc NEGATIVE / Ketone NEGATIVE  / Bili NEGATIVE / Urobili NORMAL       Blood MODERATE / Protein 50 / Leuk Est NEGATIVE / Nitrite NEGATIVE      RBC >50 / WBC 3-5 / Hyaline 2+ / Gran  / Sq Epi FEW / Non Sq Epi  / Bacteria NEGATIVE    Urine Creatinine 55.90      [02-10-19 @ 21:10]  Urine Protein 50.8      [02-10-19 @ 21:10]  Urine Sodium 80      [02-10-19 @ 21:10]  Urine Potassium 16.1      [02-10-19 @ 21:10]  Urine Chloride 78      [02-10-19 @ 21:10]    Iron 11, TIBC 133, %sat --      [02-06-19 @ 06:01]  Ferritin 1928      [02-06-19 @ 06:01]  HbA1c 7.6      [02-05-19 @ 21:25]    HBsAg NEGATIVE      [02-12-19 @ 06:15]  HCV 0.04, Nonreactive Hepatitis C AB  S/CO Ratio                        Interpretation  < 1.0                                     Non-Reactive  1.0 - 4.9                           Weakly-Reactive  > 5.0                                 Reactive  Non-Reactive: Aperson with a non-reactive HCV antibody  result is considered uninfected.  No further action is  needed unless recent infection is suspected.  In these  cases, consider repeat testing later to detect  seroconversion..  Weakly-Reactive: HCV antibody test is abnormal, HCV RNA  Qualitative test will follow.  Reactive: HCV antibody test is abnormal, HCV RNA  Qualitative test will follow.  Note: HCV antibody testing is performed on the Abbott   system.      [02-12-19 @ 06:15]    EMELI: titer Negative, pattern --      [02-12-19 @ 06:15]  dsDNA <12      [02-12-19 @ 06:15]  C3 Complement 145.5      [02-12-19 @ 06:15]  C4 Complement 27.2      [02-12-19 @ 06:15]  ANCA: cANCA Negative, pANCA Negative, atypical ANCA --      [02-12-19 @ 06:15]  anti-GBM <1.0      [02-12-19 @ 06:15]  Syphilis Screen (Treponema Pallidum Ab) Negative      [02-14-19 @ 05:45]

## 2019-02-15 NOTE — PROGRESS NOTE ADULT - ASSESSMENT
73yo F with PMH of breast CA on chemo (last treatment 8 days ago), CKD, HTN, DMT2 presenting s/p syncopal episode, found to be in neutropenic sepsis (T 39.4-39.7 C, -124, RR 20, lactate 2.9, WBC 0.35, 0.01 K/ul) in setting of recent chemotherapy, likely 2/2 PNA 2/2 legionella, c/b ISABELLA on CKD, having recurrent fevers respiratory distress on BiPAP.  Transferred from MICU to floors 2/12  Legionella PNA  multi- focal PNA  and Neutropenia.- now resolved  Diiarrhea- ?? Osmotic  Orthostatic BP 73yo F with PMH of breast CA on chemo (last treatment 8 days ago), CKD, HTN, DMT2 presenting s/p syncopal episode, found to be in neutropenic sepsis (T 39.4-39.7 C, -124, RR 20, lactate 2.9, WBC 0.35, 0.01 K/ul) in setting of recent chemotherapy, likely 2/2 PNA 2/2 legionella, c/b ISABELLA on CKD, having recurrent fevers respiratory distress on BiPAP.  Transferred from MICU to floors 2/12  Legionella PNA  multi- focal PNA  and Neutropenia.- now resolved  Diiarrhea- ?? Osmotic  Orthostatic BP- serum cortisol is normal.- most likely sec to diarrhea

## 2019-02-15 NOTE — CONSULT NOTE ADULT - PROBLEM SELECTOR RECOMMENDATION 9
prob related to antibiotic (vs chemotherapy)  recheck c difficile  avoid milk products (due to acquired lactase def)  GI evaluation continue to monitor

## 2019-02-15 NOTE — CONSULT NOTE ADULT - PROBLEM SELECTOR RECOMMENDATION 4
completed adjuvant chemotherapy (4 cycles of Taxotere & Cytoxan) monitor renal function  nephrology f/u

## 2019-02-15 NOTE — PROGRESS NOTE ADULT - SUBJECTIVE AND OBJECTIVE BOX
Patient is a 74y old  Female who presents with a chief complaint of Syncopal Episode (15 Feb 2019 13:54)      SUBJECTIVE / OVERNIGHT EVENTS:  Patient seen with daughter at bedside.  Gi will see patient for her ongoing diarrhea- ? osmotic??    MEDICATIONS  (STANDING):  chlorhexidine 4% Liquid 1 Application(s) Topical <User Schedule>  dextrose 5%. 1000 milliLiter(s) (50 mL/Hr) IV Continuous <Continuous>  dextrose 50% Injectable 12.5 Gram(s) IV Push once  dextrose 50% Injectable 25 Gram(s) IV Push once  dextrose 50% Injectable 25 Gram(s) IV Push once  heparin  Injectable 5000 Unit(s) SubCutaneous every 8 hours  insulin lispro (HumaLOG) corrective regimen sliding scale   SubCutaneous at bedtime  insulin lispro (HumaLOG) corrective regimen sliding scale   SubCutaneous three times a day before meals  levoFLOXacin  Tablet 500 milliGRAM(s) Oral once  loperamide 2 milliGRAM(s) Oral every 12 hours  sodium bicarbonate 650 milliGRAM(s) Oral three times a day  sodium chloride 0.9%. 1000 milliLiter(s) (80 mL/Hr) IV Continuous <Continuous>  sucralfate 1 Gram(s) Oral four times a day    MEDICATIONS  (PRN):  acetaminophen   Tablet .. 650 milliGRAM(s) Oral every 6 hours PRN Mild Pain (1 - 3)  dextrose 40% Gel 15 Gram(s) Oral once PRN Blood Glucose LESS THAN 70 milliGRAM(s)/deciliter  glucagon  Injectable 1 milliGRAM(s) IntraMuscular once PRN Glucose LESS THAN 70 milligrams/deciliter        POCT Blood Glucose.: 288 mg/dL (15 Feb 2019 12:31)  POCT Blood Glucose.: 170 mg/dL (15 Feb 2019 08:49)  POCT Blood Glucose.: 192 mg/dL (14 Feb 2019 21:58)  POCT Blood Glucose.: 195 mg/dL (14 Feb 2019 17:44)        PHYSICAL EXAM:  Vital Signs Last 24 Hrs  T(C): 37.2 (15 Feb 2019 05:45), Max: 37.2 (15 Feb 2019 05:45)  T(F): 98.9 (15 Feb 2019 05:45), Max: 98.9 (15 Feb 2019 05:45)  HR: 87 (15 Feb 2019 05:45) (83 - 95)  BP: 149/72 (15 Feb 2019 05:45) (149/72 - 169/72)  BP(mean): --  RR: 20 (15 Feb 2019 05:45) (18 - 20)  SpO2: 99% (15 Feb 2019 05:45) (99% - 100%)  GENERAL: NAD, well-developed  HEAD:  Atraumatic, Normocephalic  EYES: EOMI, PERRLA, conjunctiva and sclera clear  NECK: Supple, No JVD  CHEST/LUNG: Clear to auscultation bilaterally; No wheeze  HEART: Regular rate and rhythm; No murmurs, rubs, or gallops  ABDOMEN: Soft, Nontender, Nondistended; Bowel sounds present  EXTREMITIES:  2+ Peripheral Pulses, No clubbing, cyanosis, or edema  PSYCH: AAOx3  NEUROLOGY: non-focal  SKIN: No rashes or lesions  Flex seal in place    LABS:                        7.4    9.93  )-----------( 308      ( 15 Feb 2019 05:44 )             22.8     02-15    141  |  108<H>  |  86<H>  ----------------------------<  170<H>  3.3<L>   |  17<L>  |  2.36<H>    Ca    9.4      15 Feb 2019 05:44  Phos  4.3     02-15  Mg     2.1     02-15    TPro  6.5  /  Alb  2.9<L>  /  TBili  0.3  /  DBili  x   /  AST  38<H>  /  ALT  43<H>  /  AlkPhos  123<H>  02-15      RADIOLOGY & ADDITIONAL TESTS:    Imaging Personally Reviewed:    Consultant(s) Notes Reviewed:      Care Discussed with Consultants/Other Providers:  ID/  GI called Patient is a 74y old  Female who presents with a chief complaint of Syncopal Episode (15 Feb 2019 13:54)      SUBJECTIVE / OVERNIGHT EVENTS:  Patient seen with daughter at bedside.  Gi will see patient for her ongoing diarrhea- ? osmotic??    MEDICATIONS  (STANDING):  chlorhexidine 4% Liquid 1 Application(s) Topical <User Schedule>  dextrose 5%. 1000 milliLiter(s) (50 mL/Hr) IV Continuous <Continuous>  dextrose 50% Injectable 12.5 Gram(s) IV Push once  dextrose 50% Injectable 25 Gram(s) IV Push once  dextrose 50% Injectable 25 Gram(s) IV Push once  heparin  Injectable 5000 Unit(s) SubCutaneous every 8 hours  insulin lispro (HumaLOG) corrective regimen sliding scale   SubCutaneous at bedtime  insulin lispro (HumaLOG) corrective regimen sliding scale   SubCutaneous three times a day before meals  levoFLOXacin  Tablet 500 milliGRAM(s) Oral once  loperamide 2 milliGRAM(s) Oral every 12 hours  sodium bicarbonate 650 milliGRAM(s) Oral three times a day  sodium chloride 0.9%. 1000 milliLiter(s) (80 mL/Hr) IV Continuous <Continuous>  sucralfate 1 Gram(s) Oral four times a day    MEDICATIONS  (PRN):  acetaminophen   Tablet .. 650 milliGRAM(s) Oral every 6 hours PRN Mild Pain (1 - 3)  dextrose 40% Gel 15 Gram(s) Oral once PRN Blood Glucose LESS THAN 70 milliGRAM(s)/deciliter  glucagon  Injectable 1 milliGRAM(s) IntraMuscular once PRN Glucose LESS THAN 70 milligrams/deciliter        POCT Blood Glucose.: 288 mg/dL (15 Feb 2019 12:31)  POCT Blood Glucose.: 170 mg/dL (15 Feb 2019 08:49)  POCT Blood Glucose.: 192 mg/dL (14 Feb 2019 21:58)  POCT Blood Glucose.: 195 mg/dL (14 Feb 2019 17:44)        PHYSICAL EXAM:  Vital Signs Last 24 Hrs  T(C): 37.2 (15 Feb 2019 05:45), Max: 37.2 (15 Feb 2019 05:45)  T(F): 98.9 (15 Feb 2019 05:45), Max: 98.9 (15 Feb 2019 05:45)  HR: 87 (15 Feb 2019 05:45) (83 - 95)  BP: 149/72 (15 Feb 2019 05:45) (149/72 - 169/72)  BP(mean): --  RR: 20 (15 Feb 2019 05:45) (18 - 20)  SpO2: 99% (15 Feb 2019 05:45) (99% - 100%)  GENERAL: NAD, well-developed  HEAD:  Atraumatic, Normocephalic  EYES: EOMI, PERRLA, conjunctiva and sclera clear  NECK: Supple, No JVD  CHEST/LUNG: Clear to auscultation bilaterally; No wheeze  HEART: Regular rate and rhythm; No murmurs, rubs, or gallops  ABDOMEN: Soft, Nontender, Nondistended; Bowel sounds present  EXTREMITIES:  2+ Peripheral Pulses, No clubbing, cyanosis, or edema  PSYCH: AAOx3  NEUROLOGY: non-focal  SKIN: No rashes or lesions  Flex seal in place- still with LIQUID STOOL    LABS:                        7.4    9.93  )-----------( 308      ( 15 Feb 2019 05:44 )             22.8     02-15    141  |  108<H>  |  86<H>  ----------------------------<  170<H>  3.3<L>   |  17<L>  |  2.36<H>    Ca    9.4      15 Feb 2019 05:44  Phos  4.3     02-15  Mg     2.1     02-15    TPro  6.5  /  Alb  2.9<L>  /  TBili  0.3  /  DBili  x   /  AST  38<H>  /  ALT  43<H>  /  AlkPhos  123<H>  02-15      RADIOLOGY & ADDITIONAL TESTS:    Imaging Personally Reviewed:    Consultant(s) Notes Reviewed:      Care Discussed with Consultants/Other Providers:  ID/  GI called

## 2019-02-15 NOTE — PROGRESS NOTE ADULT - ATTENDING COMMENTS
Gi to help with diarrhea.  PT eval  Dr Ruiz [phone]- not able to reach MD- ?? diarrhea chemo related??  Dr Kody Agee c  - onc called for Help

## 2019-02-15 NOTE — PROGRESS NOTE ADULT - PROBLEM SELECTOR PLAN 1
-Met sepsis criteria in the ED (T 39.4-39.7 C, -124, RR 20, lactate 2.9), with Resolved Sepsis  Complete iv Azythro -Met sepsis criteria in the ED (T 39.4-39.7 C, -124, RR 20, lactate 2.9), with Resolved Sepsis  Complete iv Azythro 2/23- ID rec change to Levaquin since Azythro can cause diarrhea

## 2019-02-15 NOTE — PROGRESS NOTE ADULT - PROBLEM SELECTOR PLAN 9
-Hx of malignant neoplasm of right female breast and intraductal carcinoma in situ of left breast) on chemo (last treatment 1/29/19, final treatment of cyclophosphamide)  -Spoke with Dr. Ruiz ([phone]), does not come to [facility]  -As per house Onc; will continue Neupogen.

## 2019-02-15 NOTE — PROGRESS NOTE ADULT - PROBLEM SELECTOR PLAN 4
-Stool studies negative, neg Cdiff .  Still liquid stool. IVF .  Stool lytrs/ fat/ giaac and Gi consult  start carafate and imodium -Stool studies negative, neg Cdiff .  Still liquid stool. IVF .  Stool lytrs/ fat/ giaac and Gi consult called  start carafate and imodium

## 2019-02-15 NOTE — PROGRESS NOTE ADULT - ASSESSMENT
a 75yo F with PMH of breast CA (dx with malignant neoplasm of right female breast and intraductal carcinoma in situ of left breast) on chemo (last treatment 8 days ago), CKD, HTN, DMT2 (not on home insulin) presenting s/p syncopal episode, found to be febrile, hypoxic requiring bipap, febrile to 103, neutropenic  now not neutropenic after Neupogen yesterday.   CT chest negative for PT but shows right upper lobe pneumonia. Initially started on Fluconazole vancomycin, zosyn, azithromycin. blood cx are negative legionella is positive.     Right upper lobe pneumonia   Neutropenic fever (neutropenia now resolved s/p neupogen)      1) Right upper lobe pneumonia:  Urine antigen positive for legionella  Continue abx for 14 days through 2/23      2) Neutropenia: likely secondary to recent chemotherapy.    Continue to monitor ANC, now over 500    3) Fever: multifactorial; due to pneumonia, complicated by neutropenia  Continue to monitor.  Resolved    4) Diarrhea: GI PCR panel is negative  Legionella can present with GI symptoms  Doubt this is an infectious diarrhea  can change azithromycin to levaquin (azitnro can cause diarrhea)    5) CKD: dose adjust abx

## 2019-02-15 NOTE — PROGRESS NOTE ADULT - PROBLEM SELECTOR PLAN 2
-RLL opacity on CXR   Legonella PNA- c/w Iv Azythro -RLL opacity on CXR   Maryam PNA- c/w Iv Azythro- change to levaquin until 2/23

## 2019-02-15 NOTE — PROGRESS NOTE ADULT - SUBJECTIVE AND OBJECTIVE BOX
Follow Up:      Inverval History/ROS:Patient is a 74y old  Female who presents with a chief complaint of Syncopal Episode (15 Feb 2019 13:10)  No fever.   Denies fever. Denies SOB/cough.   Still has loose stool.      Allergies    No Known Allergies    Intolerances    codeine (Nausea)      ANTIMICROBIALS:  levoFLOXacin  Tablet 500 every 24 hours      OTHER MEDS:  acetaminophen   Tablet .. 650 milliGRAM(s) Oral every 6 hours PRN  chlorhexidine 4% Liquid 1 Application(s) Topical <User Schedule>  dextrose 40% Gel 15 Gram(s) Oral once PRN  dextrose 5%. 1000 milliLiter(s) IV Continuous <Continuous>  dextrose 50% Injectable 12.5 Gram(s) IV Push once  dextrose 50% Injectable 25 Gram(s) IV Push once  dextrose 50% Injectable 25 Gram(s) IV Push once  glucagon  Injectable 1 milliGRAM(s) IntraMuscular once PRN  heparin  Injectable 5000 Unit(s) SubCutaneous every 8 hours  insulin lispro (HumaLOG) corrective regimen sliding scale   SubCutaneous at bedtime  insulin lispro (HumaLOG) corrective regimen sliding scale   SubCutaneous three times a day before meals  loperamide 2 milliGRAM(s) Oral every 12 hours  sodium bicarbonate 650 milliGRAM(s) Oral three times a day  sodium chloride 0.9%. 1000 milliLiter(s) IV Continuous <Continuous>  sucralfate 1 Gram(s) Oral four times a day      Vital Signs Last 24 Hrs  T(C): 37.2 (15 Feb 2019 05:45), Max: 37.2 (15 Feb 2019 05:45)  T(F): 98.9 (15 Feb 2019 05:45), Max: 98.9 (15 Feb 2019 05:45)  HR: 87 (15 Feb 2019 05:45) (83 - 95)  BP: 149/72 (15 Feb 2019 05:45) (149/72 - 169/72)  BP(mean): --  RR: 20 (15 Feb 2019 05:45) (18 - 20)  SpO2: 99% (15 Feb 2019 05:45) (99% - 100%)    PHYSICAL EXAM:  General: [x ] non-toxic  HEAD/EYES: [ ] PERRL [x ] white sclera [ ] icterus  ENT:  [ ] normal [x ] supple [ ] thrush [ ] pharyngeal exudate  Cardiovascular:   [ ] murmur [ ] normal [ ] PPM/AICD  Respiratory:  [x ] clear to ausculation bilaterally  GI:  [x ] soft, non-tender, normal bowel sounds  :  [ ] miller [ ] no CVA tenderness   Musculoskeletal:  [ ] no synovitis  Neurologic:  [x ] non-focal exam   Skin:  [x ] no rash  Lymph: [x ] no lymphadenopathy  Psychiatric:  [ ] appropriate affect [ ] alert & oriented  Lines:  [x ] no phlebitis [ ] central line                                7.4    9.93  )-----------( 308      ( 15 Feb 2019 05:44 )             22.8       02-15    141  |  108<H>  |  86<H>  ----------------------------<  170<H>  3.3<L>   |  17<L>  |  2.36<H>    Ca    9.4      15 Feb 2019 05:44  Phos  4.3     02-15  Mg     2.1     02-15    TPro  6.5  /  Alb  2.9<L>  /  TBili  0.3  /  DBili  x   /  AST  38<H>  /  ALT  43<H>  /  AlkPhos  123<H>  02-15          MICROBIOLOGY:    RADIOLOGY:

## 2019-02-15 NOTE — CONSULT NOTE ADULT - ASSESSMENT
Pt is a 73yo F with PMH of breast CA (dx with malignant neoplasm of right female breast and intraductal carcinoma in situ of left breast) on chemo (last treatment 8 days PTA; completed 4 cycles), CKD, HTN, DMT2 (not on home insulin) presenting s/p syncopal episode.

## 2019-02-15 NOTE — PROGRESS NOTE ADULT - PROBLEM SELECTOR PLAN 10
Orthostatic BP  IVF hydration.  Monitor Orthostatic BP most likely sec to diarrhea  IVF hydration.  Monitor

## 2019-02-15 NOTE — CONSULT NOTE ADULT - ASSESSMENT
Impression:  1)Diarrhea- likely multifactorial in setting of legionella pneumonia, medications (previously on azithromycin), possibly post-infectious IBS or diabetic enteropathy. Most recent stool studies negative (C. Diff PCR, GI Stool PCR).     Recommendations:  -c/w supportive care- IVF hydration, lactose-free diet as tolerated  -monitor electrolytes and replete as necessary  -c/w loperamide for now (started yesterday afternoon); if persistent diarrhea over the next 24 hours can increase to q8hr dosing  -appreciate ID recommendations- switched azithromycin to levofloxacin  -if fevers or abdominal pain would repeat stool studies     Please call with questions  Niki Hunt  GI Fellow  Pager: [phone] Impression:  1)Diarrhea- likely multifactorial in setting of legionella pneumonia, recent chemo, medications (previously on azithromycin), possibly post-infectious IBS or diabetic enteropathy. Most recent stool studies negative (C. Diff PCR, GI Stool PCR).     Recommendations:  -c/w supportive care- IVF hydration, lactose-free diet as tolerated  -monitor electrolytes and replete as necessary  -c/w loperamide, can increase to q6 hr dosing  -appreciate ID recommendations- switched azithromycin to levofloxacin  -if fevers or abdominal pain would repeat stool studies     Please call with questions  Niki Hunt  GI Fellow  Pager: [phone]

## 2019-02-15 NOTE — PROGRESS NOTE ADULT - PROBLEM SELECTOR PLAN 1
Pt with baseline SCr 1.2-1.6 admitted with elevated SCr to 2.35 (2/5/19). Scr however improved to 1.6 on 2/7/19. Pt had a CT with IV contrast on 2/7/19. Scr elevated to 3.7 (2/10/19). Pt received two doses of Filgrastim during her stay. She also had one dose of ibruprofen. Pt with possible hemodynamic ISABELLA in the setting of IV contrast, PNA and NSAIDs. Scr elevated to 4.1 (2/11/19). Scr improved to 2.36 today. Pt non-oliguric. UA with  blood and protein on 2/6.  Pt with 0.9g proteinuria on spot urine exam. Hepatitis C antibody negative. C3 and C4 WNL. EMELI, RPR, ANCA, anti-GBM, dsDNA, HBSAg are negative. SIFE reveal no monoclonal band. Pt. likely with ATN. Monitor BMP. Strict I/Os. Avoid nephrotoxins. Renally dose all medications.

## 2019-02-15 NOTE — CONSULT NOTE ADULT - PROBLEM SELECTOR RECOMMENDATION 5
prob due to dehydration & met sepsis syndrome  will monitor completed adjuvant chemotherapy (4 cycles of Taxotere & Cytoxan)

## 2019-02-15 NOTE — PROGRESS NOTE ADULT - PROBLEM SELECTOR PLAN 6
Pos Orthostatic  static BP  Start NS IVF.  Monitor stool occult. t/s in AM  'Gi eval  Am cortisol Pos Orthostatic  static BP  Start NS IVF.  AM cortisaol nl  'Gi eval  Am cortisol

## 2019-02-15 NOTE — PROGRESS NOTE ADULT - PROBLEM SELECTOR PLAN 7
-ISABELLA on CKD likely ATN in the setting of sepsis and now contrast exposure   -Cr at admission 2.35, with a baseline of ~1.3  creat 3.2 today  Nephrology consult appreciated- rec   "Pt non-oliguric.  UA with  blood and protein on 2/6.  Pt with 0.9g proteinuria on spot urine exam. Hepatitis C antibody negative. C3 and C4 WNL. Check EMELI, ANCA, anti-GBM, dsDNA, HBSAg, SIFE, RPR, cryoglobulin levels. Avoid NSAIDs."  -Continue IVF  -continue to monitor Cr. -ISABELLA on CKD likely ATN in the setting of sepsis and now contrast exposure   -Cr at admission 2.35, with a baseline of ~1.3  creat 2.3- c/w ivf hydratiion  Nephrology consult appreciated- rec   "Pt non-oliguric.  UA with  blood and protein on 2/6.  Pt with 0.9g proteinuria on spot urine exam. Hepatitis C antibody negative. C3 and C4 WNL. Check EMELI, ANCA, anti-GBM, dsDNA, HBSAg, SIFE, RPR, cryoglobulin levels. Avoid NSAIDs."  -Continue IVF  -continue to monitor Cr.

## 2019-02-16 DIAGNOSIS — E87.2 ACIDOSIS: ICD-10-CM

## 2019-02-16 DIAGNOSIS — N18.3 CHRONIC KIDNEY DISEASE, STAGE 3 (MODERATE): ICD-10-CM

## 2019-02-16 LAB
ALBUMIN SERPL ELPH-MCNC: 3.1 G/DL — LOW (ref 3.3–5)
ALP SERPL-CCNC: 119 U/L — SIGNIFICANT CHANGE UP (ref 40–120)
ALT FLD-CCNC: 35 U/L — HIGH (ref 4–33)
ANION GAP SERPL CALC-SCNC: 17 MMO/L — HIGH (ref 7–14)
APPEARANCE UR: CLEAR — SIGNIFICANT CHANGE UP
AST SERPL-CCNC: 29 U/L — SIGNIFICANT CHANGE UP (ref 4–32)
BACTERIA # UR AUTO: NEGATIVE — SIGNIFICANT CHANGE UP
BASOPHILS # BLD AUTO: 0.03 K/UL — SIGNIFICANT CHANGE UP (ref 0–0.2)
BASOPHILS NFR BLD AUTO: 0.3 % — SIGNIFICANT CHANGE UP (ref 0–2)
BILIRUB SERPL-MCNC: 0.4 MG/DL — SIGNIFICANT CHANGE UP (ref 0.2–1.2)
BILIRUB UR-MCNC: NEGATIVE — SIGNIFICANT CHANGE UP
BLOOD UR QL VISUAL: NEGATIVE — SIGNIFICANT CHANGE UP
BUN SERPL-MCNC: 65 MG/DL — HIGH (ref 7–23)
CALCIUM SERPL-MCNC: 9.5 MG/DL — SIGNIFICANT CHANGE UP (ref 8.4–10.5)
CHLORIDE SERPL-SCNC: 111 MMOL/L — HIGH (ref 98–107)
CO2 SERPL-SCNC: 17 MMOL/L — LOW (ref 22–31)
COLOR SPEC: SIGNIFICANT CHANGE UP
CREAT SERPL-MCNC: 1.99 MG/DL — HIGH (ref 0.5–1.3)
EOSINOPHIL # BLD AUTO: 0.01 K/UL — SIGNIFICANT CHANGE UP (ref 0–0.5)
EOSINOPHIL NFR BLD AUTO: 0.1 % — SIGNIFICANT CHANGE UP (ref 0–6)
GLUCOSE BLDC GLUCOMTR-MCNC: 159 MG/DL — HIGH (ref 70–99)
GLUCOSE BLDC GLUCOMTR-MCNC: 189 MG/DL — HIGH (ref 70–99)
GLUCOSE BLDC GLUCOMTR-MCNC: 197 MG/DL — HIGH (ref 70–99)
GLUCOSE BLDC GLUCOMTR-MCNC: 252 MG/DL — HIGH (ref 70–99)
GLUCOSE SERPL-MCNC: 174 MG/DL — HIGH (ref 70–99)
GLUCOSE UR-MCNC: NEGATIVE — SIGNIFICANT CHANGE UP
HCT VFR BLD CALC: 23.9 % — LOW (ref 34.5–45)
HCT VFR BLD CALC: 23.9 % — LOW (ref 34.5–45)
HGB BLD-MCNC: 7.5 G/DL — LOW (ref 11.5–15.5)
HGB BLD-MCNC: 7.5 G/DL — LOW (ref 11.5–15.5)
HYALINE CASTS # UR AUTO: SIGNIFICANT CHANGE UP
IMM GRANULOCYTES NFR BLD AUTO: 1.5 % — SIGNIFICANT CHANGE UP (ref 0–1.5)
KETONES UR-MCNC: NEGATIVE — SIGNIFICANT CHANGE UP
LEUKOCYTE ESTERASE UR-ACNC: NEGATIVE — SIGNIFICANT CHANGE UP
LYMPHOCYTES # BLD AUTO: 0.72 K/UL — LOW (ref 1–3.3)
LYMPHOCYTES # BLD AUTO: 7.4 % — LOW (ref 13–44)
MAGNESIUM SERPL-MCNC: 1.8 MG/DL — SIGNIFICANT CHANGE UP (ref 1.6–2.6)
MCHC RBC-ENTMCNC: 28.1 PG — SIGNIFICANT CHANGE UP (ref 27–34)
MCHC RBC-ENTMCNC: 28.1 PG — SIGNIFICANT CHANGE UP (ref 27–34)
MCHC RBC-ENTMCNC: 31.4 % — LOW (ref 32–36)
MCHC RBC-ENTMCNC: 31.4 % — LOW (ref 32–36)
MCV RBC AUTO: 89.5 FL — SIGNIFICANT CHANGE UP (ref 80–100)
MCV RBC AUTO: 89.5 FL — SIGNIFICANT CHANGE UP (ref 80–100)
MONOCYTES # BLD AUTO: 0.77 K/UL — SIGNIFICANT CHANGE UP (ref 0–0.9)
MONOCYTES NFR BLD AUTO: 8 % — SIGNIFICANT CHANGE UP (ref 2–14)
NEUTROPHILS # BLD AUTO: 8 K/UL — HIGH (ref 1.8–7.4)
NEUTROPHILS NFR BLD AUTO: 82.7 % — HIGH (ref 43–77)
NITRITE UR-MCNC: NEGATIVE — SIGNIFICANT CHANGE UP
NRBC # FLD: 0 K/UL — LOW (ref 25–125)
NRBC # FLD: 0 K/UL — LOW (ref 25–125)
PH UR: 6 — SIGNIFICANT CHANGE UP (ref 5–8)
PHOSPHATE SERPL-MCNC: 3.6 MG/DL — SIGNIFICANT CHANGE UP (ref 2.5–4.5)
PLATELET # BLD AUTO: 323 K/UL — SIGNIFICANT CHANGE UP (ref 150–400)
PLATELET # BLD AUTO: 323 K/UL — SIGNIFICANT CHANGE UP (ref 150–400)
PMV BLD: 10.2 FL — SIGNIFICANT CHANGE UP (ref 7–13)
PMV BLD: 10.2 FL — SIGNIFICANT CHANGE UP (ref 7–13)
POTASSIUM SERPL-MCNC: 3.9 MMOL/L — SIGNIFICANT CHANGE UP (ref 3.5–5.3)
POTASSIUM SERPL-SCNC: 3.9 MMOL/L — SIGNIFICANT CHANGE UP (ref 3.5–5.3)
PROT SERPL-MCNC: 6.6 G/DL — SIGNIFICANT CHANGE UP (ref 6–8.3)
PROT UR-MCNC: 20 — SIGNIFICANT CHANGE UP
RBC # BLD: 2.67 M/UL — LOW (ref 3.8–5.2)
RBC # BLD: 2.67 M/UL — LOW (ref 3.8–5.2)
RBC # FLD: 21.2 % — HIGH (ref 10.3–14.5)
RBC # FLD: 21.2 % — HIGH (ref 10.3–14.5)
RBC CASTS # UR COMP ASSIST: SIGNIFICANT CHANGE UP (ref 0–?)
SODIUM SERPL-SCNC: 145 MMOL/L — SIGNIFICANT CHANGE UP (ref 135–145)
SP GR SPEC: 1.01 — SIGNIFICANT CHANGE UP (ref 1–1.04)
SQUAMOUS # UR AUTO: SIGNIFICANT CHANGE UP
UROBILINOGEN FLD QL: NORMAL — SIGNIFICANT CHANGE UP
WBC # BLD: 9.68 K/UL — SIGNIFICANT CHANGE UP (ref 3.8–10.5)
WBC # BLD: 9.68 K/UL — SIGNIFICANT CHANGE UP (ref 3.8–10.5)
WBC # FLD AUTO: 9.68 K/UL — SIGNIFICANT CHANGE UP (ref 3.8–10.5)
WBC # FLD AUTO: 9.68 K/UL — SIGNIFICANT CHANGE UP (ref 3.8–10.5)
WBC UR QL: SIGNIFICANT CHANGE UP (ref 0–?)

## 2019-02-16 PROCEDURE — 99232 SBSQ HOSP IP/OBS MODERATE 35: CPT | Mod: GC

## 2019-02-16 PROCEDURE — 99233 SBSQ HOSP IP/OBS HIGH 50: CPT

## 2019-02-16 RX ORDER — LOPERAMIDE HCL 2 MG
2 TABLET ORAL EVERY 6 HOURS
Qty: 0 | Refills: 0 | Status: DISCONTINUED | OUTPATIENT
Start: 2019-02-16 | End: 2019-02-20

## 2019-02-16 RX ORDER — SODIUM BICARBONATE 1 MEQ/ML
0.04 SYRINGE (ML) INTRAVENOUS
Qty: 75 | Refills: 0 | Status: DISCONTINUED | OUTPATIENT
Start: 2019-02-16 | End: 2019-02-17

## 2019-02-16 RX ORDER — COLCHICINE 0.6 MG
0.6 TABLET ORAL ONCE
Qty: 0 | Refills: 0 | Status: COMPLETED | OUTPATIENT
Start: 2019-02-16 | End: 2019-02-16

## 2019-02-16 RX ORDER — ACETAMINOPHEN 500 MG
650 TABLET ORAL EVERY 6 HOURS
Qty: 0 | Refills: 0 | Status: DISCONTINUED | OUTPATIENT
Start: 2019-02-16 | End: 2019-02-20

## 2019-02-16 RX ORDER — COLCHICINE 0.6 MG
0.6 TABLET ORAL EVERY 8 HOURS
Qty: 0 | Refills: 0 | Status: DISCONTINUED | OUTPATIENT
Start: 2019-02-16 | End: 2019-02-16

## 2019-02-16 RX ADMIN — Medication 2 MILLIGRAM(S): at 18:20

## 2019-02-16 RX ADMIN — Medication 650 MILLIGRAM(S): at 06:24

## 2019-02-16 RX ADMIN — Medication 1 GRAM(S): at 11:12

## 2019-02-16 RX ADMIN — Medication 2 MILLIGRAM(S): at 23:18

## 2019-02-16 RX ADMIN — Medication 75 MEQ/KG/HR: at 23:19

## 2019-02-16 RX ADMIN — HEPARIN SODIUM 5000 UNIT(S): 5000 INJECTION INTRAVENOUS; SUBCUTANEOUS at 06:24

## 2019-02-16 RX ADMIN — Medication 650 MILLIGRAM(S): at 21:54

## 2019-02-16 RX ADMIN — Medication 75 MEQ/KG/HR: at 12:43

## 2019-02-16 RX ADMIN — ERYTHROPOIETIN 10000 UNIT(S): 10000 INJECTION, SOLUTION INTRAVENOUS; SUBCUTANEOUS at 11:11

## 2019-02-16 RX ADMIN — HEPARIN SODIUM 5000 UNIT(S): 5000 INJECTION INTRAVENOUS; SUBCUTANEOUS at 22:28

## 2019-02-16 RX ADMIN — Medication 650 MILLIGRAM(S): at 19:54

## 2019-02-16 RX ADMIN — Medication 2 MILLIGRAM(S): at 06:24

## 2019-02-16 RX ADMIN — Medication 650 MILLIGRAM(S): at 14:32

## 2019-02-16 RX ADMIN — Medication 1: at 22:28

## 2019-02-16 RX ADMIN — Medication 1: at 18:19

## 2019-02-16 RX ADMIN — Medication 1 GRAM(S): at 18:20

## 2019-02-16 RX ADMIN — Medication 1 GRAM(S): at 06:24

## 2019-02-16 RX ADMIN — Medication 1 GRAM(S): at 23:18

## 2019-02-16 RX ADMIN — CHLORHEXIDINE GLUCONATE 1 APPLICATION(S): 213 SOLUTION TOPICAL at 11:11

## 2019-02-16 RX ADMIN — Medication 1: at 12:39

## 2019-02-16 RX ADMIN — Medication 0.6 MILLIGRAM(S): at 23:19

## 2019-02-16 RX ADMIN — Medication 1: at 08:49

## 2019-02-16 RX ADMIN — Medication 650 MILLIGRAM(S): at 22:29

## 2019-02-16 NOTE — PROGRESS NOTE ADULT - PROBLEM SELECTOR PLAN 7
-ISABELLA on CKD likely ATN in the setting of sepsis and now contrast exposure   -Cr at admission 2.35, with a baseline of ~1.3  creat 2.3- c/w ivf hydratiion  Nephrology consult appreciated- rec   "Pt non-oliguric.  UA with  blood and protein on 2/6.  Pt with 0.9g proteinuria on spot urine exam. Hepatitis C antibody negative. C3 and C4 WNL. Check EMELI, ANCA, anti-GBM, dsDNA, HBSAg, SIFE, RPR, cryoglobulin levels. Avoid NSAIDs."  -Continue IVF  -continue to monitor Cr. -ISABELLA on CKD likely ATN in the setting of sepsis and now contrast exposure   -Cr at admission 2.35, with a baseline of ~1.3  -pt with  metabolic acidosis from diarrhea, change IVF to NaHCO3 drip  Nephrology consult appreciated- rec   "Pt non-oliguric.  UA with  blood and protein on 2/6.  Pt with 0.9g proteinuria on spot urine exam. Hepatitis C antibody negative. C3 and C4 WNL. Check EMELI, ANCA, anti-GBM, dsDNA, HBSAg, SIFE, RPR, cryoglobulin levels. Avoid NSAIDs."  -Continue IVF  -continue to monitor Cr.

## 2019-02-16 NOTE — PROGRESS NOTE ADULT - SUBJECTIVE AND OBJECTIVE BOX
Feels slightly stronger  Still has some diarrhea    REVIEW OF SYSTEMS:    CONSTITUTIONAL: pos weakness, no fevers or chills, weight loss  EYES/ENT: No visual changes, no throat pain   RESPIRATORY: No cough, wheezing, hemoptysis; No shortness of breath  CARDIOVASCULAR: No chest pain or palpitations  GASTROINTESTINAL: No abdominal, nausea, vomiting, or hematemesis; pos diarrhea; no constipation. No melena or hematochezia.  GENITOURINARY: No dysuria, frequency or hematuria  NEUROLOGICAL: No dizziness, numbness, or weakness      VITAL SIGNS:    T 99.4, P 111 /83 P18    PHYSICAL EXAM:     GENERAL: no acute distress  HEENT: NC/AT, EOMI, neck supple, MMM  RESPIRATORY: LCTAB/L, no rhonchi, rales, or wheezing  CARDIOVASCULAR: RRR, no murmurs, gallops, rubs  ABDOMINAL: soft, non-tender, non-distended, positive bowel sounds   EXTREMITIES: no clubbing, cyanosis, or edema  NEUROLOGICAL: alert and oriented x 3, non-focal  LYMPHATIC: lymphatic: cervical, supraclavicular, axilla, inguinal                          7.5    9.68  )-----------( 323      ( 16 Feb 2019 08:15 )             23.9     02-16    145  |  111<H>  |  65<H>  ----------------------------<  174<H>  3.9   |  17<L>  |  1.99<H>    Ca    9.5      16 Feb 2019 08:15  Phos  3.6     02-16  Mg     1.8     02-16    TPro  6.6  /  Alb  3.1<L>  /  TBili  0.4  /  DBili  x   /  AST  29  /  ALT  35<H>  /  AlkPhos  119  02-16      MEDICATIONS  (STANDING):  chlorhexidine 4% Liquid 1 Application(s) Topical <User Schedule>  dextrose 5%. 1000 milliLiter(s) (50 mL/Hr) IV Continuous <Continuous>  dextrose 50% Injectable 12.5 Gram(s) IV Push once  dextrose 50% Injectable 25 Gram(s) IV Push once  dextrose 50% Injectable 25 Gram(s) IV Push once  epoetin diogo Injectable 94654 Unit(s) SubCutaneous <User Schedule>  heparin  Injectable 5000 Unit(s) SubCutaneous every 8 hours  insulin lispro (HumaLOG) corrective regimen sliding scale   SubCutaneous at bedtime  insulin lispro (HumaLOG) corrective regimen sliding scale   SubCutaneous three times a day before meals  levoFLOXacin  Tablet 250 milliGRAM(s) Oral every 24 hours  loperamide 2 milliGRAM(s) Oral every 6 hours  sodium bicarbonate 650 milliGRAM(s) Oral three times a day  sodium bicarbonate  Infusion 0.073 mEq/kG/Hr (75 mL/Hr) IV Continuous <Continuous>  sucralfate 1 Gram(s) Oral four times a day

## 2019-02-16 NOTE — PROGRESS NOTE ADULT - PROBLEM SELECTOR PLAN 9
-Hx of malignant neoplasm of right female breast and intraductal carcinoma in situ of left breast) on chemo (last treatment 1/29/19, final treatment of cyclophosphamide)  -Spoke with Dr. Ruiz ([phone]), does not come to [facility]  -As per house Onc; will continue Neupogen.

## 2019-02-16 NOTE — PROGRESS NOTE ADULT - PROBLEM SELECTOR PLAN 1
Pt with baseline SCr 1.2-1.6 admitted with elevated SCr to 2.35 (2/5/19). Scr however improved to 1.6 on 2/7/19. Pt had a CT with IV contrast on 2/7/19. Scr elevated to 3.7 (2/10/19). Pt received two doses of Filgrastim during her stay. She also had one dose of ibruprofen. Pt with possible hemodynamic ISABELLA in the setting of IV contrast, PNA and NSAIDs. Scr elevated to 4.1 (2/11/19). Scr improved to 1.99 today. Pt non-oliguric. UA with  blood and protein on 2/6.  Pt with 0.9g proteinuria on spot urine exam. Hepatitis C antibody negative. C3 and C4 WNL. EMELI, RPR, ANCA, anti-GBM, dsDNA, HBSAg are negative. SIFE reveal no monoclonal band. Pt. likely with ATN. Monitor BMP. Strict I/Os. Avoid nephrotoxins. Renally dose all medications.

## 2019-02-16 NOTE — CHART NOTE - NSCHARTNOTEFT_GEN_A_CORE
Notified by RN patient c/o of finger pain. Seen and evaluated patient seen at the bedside, patient reports severe right index finger joint pain secondary to gout flare up.  Patient reports history of gout and takes Colchicine at home as needed.  Will continue to monitor.   -Colchicine 0.6mg PO PRN  -Ice and elevate hand Notified by RN patient c/o of finger pain. Seen and evaluated patient seen at the bedside, patient reports severe right index finger joint pain secondary to gout flare up.  Patient reports history of gout and takes Colchicine at home as needed.  Will continue to monitor.   -Colchicine 0.6mg PO X1   -Ice and elevate hand

## 2019-02-16 NOTE — PROGRESS NOTE ADULT - PROBLEM SELECTOR PLAN 2
Verbal order to cancel previous dilaudid order via dr. Peck Prim. Hyperchloremic metabolic acidosis. Exacerbated by NS IVFs which have been discontinued this AM. Placed on bicarbonate gtt as per primary team. Monitor serum potassium closely given bicarbonate infusion. Consider alternative of LR infusion

## 2019-02-16 NOTE — PROGRESS NOTE ADULT - SUBJECTIVE AND OBJECTIVE BOX
[facility] DIVISION OF KIDNEY DISEASES AND HYPERTENSION -- FOLLOW UP NOTE  --------------------------------------------------------------------------------  Chief complaint: ISABELLA    24 hour events/subjective: Patient seen and examined at bedside this AM. Patient states that he feels well and denies SOB.    PAST HISTORY  --------------------------------------------------------------------------------  No significant changes to PMH, PSH, FHx, SHx, unless otherwise noted    ALLERGIES & MEDICATIONS  --------------------------------------------------------------------------------  Allergies    No Known Allergies    Intolerances    codeine (Nausea)    Standing Inpatient Medications  chlorhexidine 4% Liquid 1 Application(s) Topical <User Schedule>  dextrose 5%. 1000 milliLiter(s) IV Continuous <Continuous>  dextrose 50% Injectable 12.5 Gram(s) IV Push once  dextrose 50% Injectable 25 Gram(s) IV Push once  dextrose 50% Injectable 25 Gram(s) IV Push once  epoetin diogo Injectable 91738 Unit(s) SubCutaneous <User Schedule>  heparin  Injectable 5000 Unit(s) SubCutaneous every 8 hours  insulin lispro (HumaLOG) corrective regimen sliding scale   SubCutaneous at bedtime  insulin lispro (HumaLOG) corrective regimen sliding scale   SubCutaneous three times a day before meals  levoFLOXacin  Tablet 250 milliGRAM(s) Oral every 24 hours  loperamide 2 milliGRAM(s) Oral every 6 hours  sodium bicarbonate 650 milliGRAM(s) Oral three times a day  sodium bicarbonate  Infusion 0.073 mEq/kG/Hr IV Continuous <Continuous>  sucralfate 1 Gram(s) Oral four times a day    REVIEW OF SYSTEMS  --------------------------------------------------------------------------------  Gen: + fatigue  Respiratory: No dyspnea  CV: No chest pain  GI: No abdominal pain  MSK: No LE edema  Neuro: No dizziness  Heme: No bleeding    All other systems were reviewed and are negative, except as noted.    VITALS/PHYSICAL EXAM  --------------------------------------------------------------------------------  T(C): 37.4 (02-16-19 @ 14:25), Max: 37.7 (02-16-19 @ 06:20)  HR: 111 (02-16-19 @ 14:25) (102 - 111)  BP: 160/83 (02-16-19 @ 14:25) (150/60 - 160/83)  RR: 18 (02-16-19 @ 14:25) (18 - 20)  SpO2: 98% (02-16-19 @ 14:25) (98% - 100%)  Wt(kg): --    Physical Exam:  	Gen: NAD, well-appearing  	HEENT: On NC O2  	Pulm: CTA B/L  	CV: normal S1S2; no rub  	Abd: soft                      Back : No sacral edema  	: No miller  	LE: No edema  	Skin: Warm, without rashes    LABS/STUDIES  --------------------------------------------------------------------------------              7.5    9.68  >-----------<  323      [02-16-19 @ 08:15]              23.9     145  |  111  |  65  ----------------------------<  174      [02-16-19 @ 08:15]  3.9   |  17  |  1.99        Ca     9.5     [02-16-19 @ 08:15]      Mg     1.8     [02-16-19 @ 08:15]      Phos  3.6     [02-16-19 @ 08:15]    Creatinine Trend:  SCr 1.99 [02-16 @ 08:15]  SCr 2.36 [02-15 @ 05:44]  SCr 2.83 [02-14 @ 05:45]  SCr 3.23 [02-13 @ 05:30]  SCr 3.68 [02-12 @ 06:15]

## 2019-02-16 NOTE — PROGRESS NOTE ADULT - PROBLEM SELECTOR PLAN 1
-Met sepsis criteria in the ED (T 39.4-39.7 C, -124, RR 20, lactate 2.9), with Resolved Sepsis  Complete iv Azythro 2/23- ID rec change to Levaquin since Azythro can cause diarrhea

## 2019-02-16 NOTE — PROGRESS NOTE ADULT - PROBLEM SELECTOR PLAN 4
-Stool studies negative, neg Cdiff .  Still liquid stool. IVF .  GI consult appreciated  guiac _+, supportive care per GI, loperamide q6h  start carafate and imodium

## 2019-02-16 NOTE — PROGRESS NOTE ADULT - SUBJECTIVE AND OBJECTIVE BOX
Shawanda Lowery MD  Pager [phone]    CHIEF COMPLAINT: Patient is a 74y old  female who presents with a chief complaint of Syncopal Episode (15 Feb 2019 21:55)      SUBJECTIVE / OVERNIGHT EVENTS:  pt still with diarrhea via rectal tube    MEDICATIONS  (STANDING):  chlorhexidine 4% Liquid 1 Application(s) Topical <User Schedule>  dextrose 5%. 1000 milliLiter(s) (50 mL/Hr) IV Continuous <Continuous>  dextrose 50% Injectable 12.5 Gram(s) IV Push once  dextrose 50% Injectable 25 Gram(s) IV Push once  dextrose 50% Injectable 25 Gram(s) IV Push once  epoetin diogo Injectable 03244 Unit(s) SubCutaneous <User Schedule>  heparin  Injectable 5000 Unit(s) SubCutaneous every 8 hours  insulin lispro (HumaLOG) corrective regimen sliding scale   SubCutaneous at bedtime  insulin lispro (HumaLOG) corrective regimen sliding scale   SubCutaneous three times a day before meals  levoFLOXacin  Tablet 250 milliGRAM(s) Oral every 24 hours  sodium bicarbonate 650 milliGRAM(s) Oral three times a day  sodium chloride 0.9%. 1000 milliLiter(s) (80 mL/Hr) IV Continuous <Continuous>  sucralfate 1 Gram(s) Oral four times a day    MEDICATIONS  (PRN):  acetaminophen   Tablet .. 650 milliGRAM(s) Oral every 6 hours PRN Mild Pain (1 - 3)  dextrose 40% Gel 15 Gram(s) Oral once PRN Blood Glucose LESS THAN 70 milliGRAM(s)/deciliter  glucagon  Injectable 1 milliGRAM(s) IntraMuscular once PRN Glucose LESS THAN 70 milligrams/deciliter      VITALS:  T(F): 99.8 (02-16-19 @ 09:53), Max: 99.9 (02-16-19 @ 06:20)  HR: 107 (02-16-19 @ 09:53) (94 - 107)  BP: 153/78 (02-16-19 @ 10:06) (150/60 - 178/77)  RR: 18 (02-16-19 @ 09:53) (18 - 20)  SpO2: 98% (02-16-19 @ 09:53)      CAPILLARY BLOOD GLUCOSE    Output     I&O's Summary  T(F): 99.8 (02-16-19 @ 09:53), Max: 99.9 (02-16-19 @ 06:20)  HR: 107 (02-16-19 @ 09:53) (94 - 107)  BP: 153/78 (02-16-19 @ 10:06) (150/60 - 178/77)  RR: 18 (02-16-19 @ 09:53) (18 - 20)  SpO2: 98% (02-16-19 @ 09:53)    PHYSICAL EXAM:  GENERAL: NAD, well-developed  HEAD:  Atraumatic, Normocephalic  EYES: EOMI, PERRLA, conjunctiva and sclera clear  NECK: Supple, No JVD  CHEST/LUNG: Clear to auscultation bilaterally; No wheeze  HEART: Regular rate and rhythm; No murmurs, rubs, or gallops  ABDOMEN: Soft, Nontender, Nondistended; Bowel sounds present  EXTREMITIES:  2+ Peripheral Pulses, No clubbing, cyanosis, or edema  PSYCH: AAOx3  NEUROLOGY: non-focal  SKIN: No rashes or lesions  Flex seal in place- liquid diarrhea    LABS:              7.5                  145  | 17   | 65           9.68  >-----------< 323     ------------------------< 174                   23.9                 3.9  | 111  | 1.99                                         Ca 9.5   Mg 1.8   Ph 3.6         TPro  6.6  /  Alb  3.1      TBili  0.4  /  DBili  x         AST  29  /  ALT  35            AlkPhos  119          MICROBIOLOGY:    RADIOLOGY & ADDITIONAL TESTS:    Imaging Personally Reviewed:    [ ] Consultant(s) Notes Reviewed:  [ ] Care Discussed with Consultants/Other Providers:

## 2019-02-16 NOTE — PROGRESS NOTE ADULT - ASSESSMENT
75yo F with PMH of breast CA on chemo (last treatment 8 days ago), CKD, HTN, DMT2 presenting s/p syncopal episode, found to be in neutropenic sepsis (T 39.4-39.7 C, -124, RR 20, lactate 2.9, WBC 0.35, 0.01 K/ul) in setting of recent chemotherapy, likely 2/2 PNA 2/2 legionella, c/b ISABELLA on CKD, having recurrent fevers respiratory distress on BiPAP.  Transferred from MICU to floors 2/12  Legionella PNA  multi- focal PNA  and Neutropenia.- now resolved  Diiarrhea- ?? Osmotic  Orthostatic BP- serum cortisol is normal.- most likely sec to diarrhea

## 2019-02-17 ENCOUNTER — TRANSCRIPTION ENCOUNTER (OUTPATIENT)
Age: 74
End: 2019-02-17

## 2019-02-17 DIAGNOSIS — R19.7 DIARRHEA, UNSPECIFIED: ICD-10-CM

## 2019-02-17 LAB
ANION GAP SERPL CALC-SCNC: 13 MMO/L — SIGNIFICANT CHANGE UP (ref 7–14)
BUN SERPL-MCNC: 58 MG/DL — HIGH (ref 7–23)
CALCIUM SERPL-MCNC: 9.2 MG/DL — SIGNIFICANT CHANGE UP (ref 8.4–10.5)
CHLORIDE SERPL-SCNC: 106 MMOL/L — SIGNIFICANT CHANGE UP (ref 98–107)
CO2 SERPL-SCNC: 22 MMOL/L — SIGNIFICANT CHANGE UP (ref 22–31)
CREAT SERPL-MCNC: 1.98 MG/DL — HIGH (ref 0.5–1.3)
GLUCOSE BLDC GLUCOMTR-MCNC: 150 MG/DL — HIGH (ref 70–99)
GLUCOSE BLDC GLUCOMTR-MCNC: 185 MG/DL — HIGH (ref 70–99)
GLUCOSE BLDC GLUCOMTR-MCNC: 192 MG/DL — HIGH (ref 70–99)
GLUCOSE BLDC GLUCOMTR-MCNC: 245 MG/DL — HIGH (ref 70–99)
GLUCOSE SERPL-MCNC: 185 MG/DL — HIGH (ref 70–99)
HCT VFR BLD CALC: 21.9 % — LOW (ref 34.5–45)
HCT VFR BLD CALC: 23.6 % — LOW (ref 34.5–45)
HGB BLD-MCNC: 6.7 G/DL — CRITICAL LOW (ref 11.5–15.5)
HGB BLD-MCNC: 7.6 G/DL — LOW (ref 11.5–15.5)
MAGNESIUM SERPL-MCNC: 1.7 MG/DL — SIGNIFICANT CHANGE UP (ref 1.6–2.6)
MCHC RBC-ENTMCNC: 27.6 PG — SIGNIFICANT CHANGE UP (ref 27–34)
MCHC RBC-ENTMCNC: 28.7 PG — SIGNIFICANT CHANGE UP (ref 27–34)
MCHC RBC-ENTMCNC: 30.6 % — LOW (ref 32–36)
MCHC RBC-ENTMCNC: 32.2 % — SIGNIFICANT CHANGE UP (ref 32–36)
MCV RBC AUTO: 89.1 FL — SIGNIFICANT CHANGE UP (ref 80–100)
MCV RBC AUTO: 90.1 FL — SIGNIFICANT CHANGE UP (ref 80–100)
NRBC # FLD: 0 K/UL — LOW (ref 25–125)
NRBC # FLD: 0 K/UL — LOW (ref 25–125)
PHOSPHATE SERPL-MCNC: 3.1 MG/DL — SIGNIFICANT CHANGE UP (ref 2.5–4.5)
PLATELET # BLD AUTO: 261 K/UL — SIGNIFICANT CHANGE UP (ref 150–400)
PLATELET # BLD AUTO: 312 K/UL — SIGNIFICANT CHANGE UP (ref 150–400)
PMV BLD: 10.4 FL — SIGNIFICANT CHANGE UP (ref 7–13)
PMV BLD: 9.8 FL — SIGNIFICANT CHANGE UP (ref 7–13)
POTASSIUM SERPL-MCNC: 3.9 MMOL/L — SIGNIFICANT CHANGE UP (ref 3.5–5.3)
POTASSIUM SERPL-SCNC: 3.9 MMOL/L — SIGNIFICANT CHANGE UP (ref 3.5–5.3)
RBC # BLD: 2.43 M/UL — LOW (ref 3.8–5.2)
RBC # BLD: 2.65 M/UL — LOW (ref 3.8–5.2)
RBC # FLD: 19.1 % — HIGH (ref 10.3–14.5)
RBC # FLD: 20.7 % — HIGH (ref 10.3–14.5)
SODIUM SERPL-SCNC: 141 MMOL/L — SIGNIFICANT CHANGE UP (ref 135–145)
SPECIMEN SOURCE: SIGNIFICANT CHANGE UP
SPECIMEN SOURCE: SIGNIFICANT CHANGE UP
WBC # BLD: 7.67 K/UL — SIGNIFICANT CHANGE UP (ref 3.8–10.5)
WBC # BLD: 8.4 K/UL — SIGNIFICANT CHANGE UP (ref 3.8–10.5)
WBC # FLD AUTO: 7.67 K/UL — SIGNIFICANT CHANGE UP (ref 3.8–10.5)
WBC # FLD AUTO: 8.4 K/UL — SIGNIFICANT CHANGE UP (ref 3.8–10.5)

## 2019-02-17 PROCEDURE — 99233 SBSQ HOSP IP/OBS HIGH 50: CPT

## 2019-02-17 PROCEDURE — 99232 SBSQ HOSP IP/OBS MODERATE 35: CPT | Mod: GC

## 2019-02-17 RX ORDER — ACETAMINOPHEN 500 MG
650 TABLET ORAL ONCE
Qty: 0 | Refills: 0 | Status: COMPLETED | OUTPATIENT
Start: 2019-02-17 | End: 2019-02-17

## 2019-02-17 RX ORDER — SODIUM CHLORIDE 9 MG/ML
500 INJECTION, SOLUTION INTRAVENOUS
Qty: 0 | Refills: 0 | Status: DISCONTINUED | OUTPATIENT
Start: 2019-02-17 | End: 2019-02-19

## 2019-02-17 RX ADMIN — Medication 650 MILLIGRAM(S): at 09:51

## 2019-02-17 RX ADMIN — Medication 2 MILLIGRAM(S): at 11:26

## 2019-02-17 RX ADMIN — HEPARIN SODIUM 5000 UNIT(S): 5000 INJECTION INTRAVENOUS; SUBCUTANEOUS at 06:26

## 2019-02-17 RX ADMIN — Medication 1: at 08:57

## 2019-02-17 RX ADMIN — Medication 650 MILLIGRAM(S): at 23:01

## 2019-02-17 RX ADMIN — Medication 1 GRAM(S): at 23:01

## 2019-02-17 RX ADMIN — Medication 75 MEQ/KG/HR: at 08:58

## 2019-02-17 RX ADMIN — CHLORHEXIDINE GLUCONATE 1 APPLICATION(S): 213 SOLUTION TOPICAL at 08:57

## 2019-02-17 RX ADMIN — Medication 1: at 17:55

## 2019-02-17 RX ADMIN — Medication 1 GRAM(S): at 17:55

## 2019-02-17 RX ADMIN — Medication 650 MILLIGRAM(S): at 06:26

## 2019-02-17 RX ADMIN — Medication 2 MILLIGRAM(S): at 06:26

## 2019-02-17 RX ADMIN — Medication 2: at 13:13

## 2019-02-17 RX ADMIN — Medication 650 MILLIGRAM(S): at 13:14

## 2019-02-17 RX ADMIN — Medication 2 MILLIGRAM(S): at 17:55

## 2019-02-17 RX ADMIN — Medication 2 MILLIGRAM(S): at 23:01

## 2019-02-17 RX ADMIN — SODIUM CHLORIDE 40 MILLILITER(S): 9 INJECTION, SOLUTION INTRAVENOUS at 17:55

## 2019-02-17 RX ADMIN — Medication 1 GRAM(S): at 11:26

## 2019-02-17 RX ADMIN — Medication 1 GRAM(S): at 06:26

## 2019-02-17 NOTE — DISCHARGE NOTE ADULT - NSFTFATTENDCERTRD_GEN_ALL_CORE
[FreeTextEntry2] : The patient comes in to follow-up after her prolonged hospitalizations for nephrolithiasis, urosepsis, pneumonia and C.diff.  She had been in rehab and is now home.  She had another urological procedure last week at [facility] in which she says a stent was placed and stones removed by Dr. Paul Valero.\par \par She now feels ok except for fatigue.  No urinary symptoms.  No chest pain or dyspnea.  She had lost weight but is now gaining some back.  \par \par She has been on home oxygen and she sees a pulmonologist.\par \par She says her sugars can be low at home.  \par  My signature below certifies that the above stated patient is homebound and upon completion of the Face-To-Face encounter, has the need for intermittent skilled nursing, physical therapy and/or speech or occupational therapy services in their home for their current diagnosis as outlined in their initial plan of care. These services will continue to be monitored by myself or another physician.

## 2019-02-17 NOTE — DISCHARGE NOTE ADULT - CARE PROVIDER_API CALL
Eliceo Mccartney (MD)  [place] [address]  Phone: [phone]  Fax: [phone]  Follow Up Time: Eliceo Mccartney)  Medicine  [address] [place] [address]  Phone: [phone]  Fax: [phone]  Follow Up Time:     Gayle Ruiz)  Internal Medicine; Medical Oncology  [address] [place] [address]  Phone: [phone]  Fax: [phone]  Follow Up Time:

## 2019-02-17 NOTE — DISCHARGE NOTE ADULT - MEDICATION SUMMARY - MEDICATIONS TO TAKE
I will START or STAY ON the medications listed below when I get home from the hospital:    Tylenol 8 HR Arthritis Pain 650 mg oral tablet, extended release  -- 2 tab(s) by mouth every 8 hours, As Needed  -- Indication: For Pain    glimepiride 1 mg oral tablet  -- 1 tab(s) by mouth once a day  -- Indication: For Diabetes mellitus, type 2    loperamide 2 mg oral capsule  -- 1 cap(s) by mouth every 6 hours as needed for diarrhea    OTC medication  -- Indication: For Diarrhea    colchicine 0.6 mg oral capsule  -- 1 cap(s) by mouth once a day  -- Indication: For Gout    amlodipine-benazepril 10 mg-40 mg oral capsule  -- 1 cap(s) by mouth once a day  -- Indication: For Hypertension    carvedilol 12.5 mg oral tablet  -- 1 tab(s) by mouth 2 times a day  -- Indication: For Hypertension    sucralfate 1 g oral tablet  -- 1 tab(s) by mouth every 6 hours  -- Indication: For Diarrhea / GERD    levoFLOXacin 250 mg oral tablet  -- 1 tab(s) by mouth every 24 hours  Through 2/23/19  -- Indication: For HCAP (healthcare-associated pneumonia)

## 2019-02-17 NOTE — PROGRESS NOTE ADULT - ASSESSMENT
73yo F with PMH of breast CA on chemo (last treatment 8 days ago), CKD, HTN, DMT2 presenting s/p syncopal episode, found to be in neutropenic sepsis (T 39.4-39.7 C, -124, RR 20, lactate 2.9, WBC 0.35, 0.01 K/ul) in setting of recent chemotherapy, likely 2/2 PNA 2/2 legionella, c/b ISABELLA on CKD, having recurrent fevers respiratory distress on BiPAP.  Transferred from MICU to floors 2/12  Legionella PNA  multi- focal PNA  and Neutropenia.- now resolved  Diiarrhea- ?? Osmotic  Orthostatic BP- serum cortisol is normal.- most likely sec to diarrhea

## 2019-02-17 NOTE — DISCHARGE NOTE ADULT - PROVIDER TOKENS
PROVIDER:[TOKEN:[[id number]]] PROVIDER:[TOKEN:[[id number]]],PROVIDER:[TOKEN:[[id number]]]

## 2019-02-17 NOTE — PROGRESS NOTE ADULT - PROBLEM SELECTOR PLAN 7
-ISABELLA on CKD likely ATN in the setting of sepsis and now contrast exposure   -Cr at admission 2.35, with a baseline of ~1.3  -pt with  metabolic acidosis from diarrhea, improved on NaHCO3 drip  Nephrology consult appreciated- rec   "Pt non-oliguric.  UA with  blood and protein on 2/6.  Pt with 0.9g proteinuria on spot urine exam. Hepatitis C antibody negative. C3 and C4 WNL. Check EMELI, ANCA, anti-GBM, dsDNA, HBSAg, SIFE, RPR, cryoglobulin levels. Avoid NSAIDs."  -Continue IVF  -continue to monitor Cr. -ISABELLA on CKD likely ATN in the setting of sepsis and now contrast exposure   -Cr at admission 2.35, with a baseline of ~1.3  -pt with  metabolic acidosis from diarrhea, improved on NaHCO3 drip  -has proteinuria 0.9 g on spot urine, serologies negative, complement normal, f/u nephrology recs

## 2019-02-17 NOTE — PROGRESS NOTE ADULT - PROBLEM SELECTOR PLAN 2
Originally with hyperchloremic metabolic acidosis, now resolved after bicarbonate gtt started by primary team. Recommend discontinuation of bicarbonate gtt and starting LR IVFs

## 2019-02-17 NOTE — DISCHARGE NOTE ADULT - CARE PROVIDERS DIRECT ADDRESSES
[email or web address]

## 2019-02-17 NOTE — DISCHARGE NOTE ADULT - PLAN OF CARE
resolved RLL opacity on CXR You were treated for Legionella Pneumonia, last day of Levaquin 2/23/2019 Glucose control symptoms control -neg C-diff .  You can use  loperamide as needed and imodium.    Continue carafate RLL opacity on CXR- secondary to legonela pNA- complete levaquin -neg C-diff .  You can use  loperamide as needed and imodium.    Continue Carafate.  No milk products- milk/ cheese / yogart RLL opacity on CXR- secondary to legionella pNA- complete levaquin upon discharge. Follow up with PCP within 1 week of discharge. You were treated for Legionella Pneumonia, please complete course of antibiotics (last day of Levaquin 2/23/2019). Follow up with PCP within 1 week of discharge. Resolved. Remain well hydrated. Continue consistent carbohydrate diet.  Monitor blood glucose levels throughout the day before meals and at bedtime.  Record blood sugars and bring to outpatient providers appointment in order to be reviewed by your doctor for management modifications.  Be aware of diabetes management symptoms including feeling cool and clammy may be related to low glucose levels.  Feeling hot and dry may indicate high glucose levels.  If  you feel these symptoms, check your blood sugar.  Make regular podiatry appointments in order to have feet checked for wounds and toe nails cut by a doctor to prevent infections. You can use  loperamide as needed and imodium. Remain well hydrated. Continue Carafate. No milk products- milk/ cheese / yogart

## 2019-02-17 NOTE — DISCHARGE NOTE ADULT - SECONDARY DIAGNOSIS.
HCAP (healthcare-associated pneumonia) ISABELLA (acute kidney injury) Diabetes mellitus, type 2 Diarrhea

## 2019-02-17 NOTE — DISCHARGE NOTE ADULT - ADDITIONAL INSTRUCTIONS
Please f/u with PCP Dr Farnsworth  Oncology Dr Pratt Please f/u with PCP Dr Farnsworth  Follow up with Oncologist Dr Ruiz

## 2019-02-17 NOTE — PROGRESS NOTE ADULT - SUBJECTIVE AND OBJECTIVE BOX
[facility] DIVISION OF KIDNEY DISEASES AND HYPERTENSION -- FOLLOW UP NOTE  --------------------------------------------------------------------------------  Chief complaint: ISABELLA    24 hour events/subjective: Patient seen and examined at bedside this AM. Patient states that she feels well and denies SOB.    PAST HISTORY  --------------------------------------------------------------------------------  No significant changes to PMH, PSH, FHx, SHx, unless otherwise noted    ALLERGIES & MEDICATIONS  --------------------------------------------------------------------------------  Allergies    No Known Allergies    Intolerances    codeine (Nausea)    Standing Inpatient Medications  chlorhexidine 4% Liquid 1 Application(s) Topical <User Schedule>  dextrose 5%. 1000 milliLiter(s) IV Continuous <Continuous>  dextrose 50% Injectable 12.5 Gram(s) IV Push once  dextrose 50% Injectable 25 Gram(s) IV Push once  dextrose 50% Injectable 25 Gram(s) IV Push once  epoetin diogo Injectable 33100 Unit(s) SubCutaneous <User Schedule>  insulin lispro (HumaLOG) corrective regimen sliding scale   SubCutaneous at bedtime  insulin lispro (HumaLOG) corrective regimen sliding scale   SubCutaneous three times a day before meals  levoFLOXacin  Tablet 250 milliGRAM(s) Oral every 24 hours  loperamide 2 milliGRAM(s) Oral every 6 hours  sodium bicarbonate 650 milliGRAM(s) Oral three times a day  sodium bicarbonate  Infusion 0.039 mEq/kG/Hr IV Continuous <Continuous>  sucralfate 1 Gram(s) Oral four times a day    PRN Inpatient Medications  acetaminophen   Tablet .. 650 milliGRAM(s) Oral every 6 hours PRN  acetaminophen   Tablet .. 650 milliGRAM(s) Oral every 6 hours PRN  dextrose 40% Gel 15 Gram(s) Oral once PRN  glucagon  Injectable 1 milliGRAM(s) IntraMuscular once PRN      REVIEW OF SYSTEMS  --------------------------------------------------------------------------------  Gen: No fatigue  Respiratory: No dyspnea  CV: No chest pain  GI: No abdominal pain  MSK: No LE edema  Neuro: No dizziness  Heme: No bleeding    All other systems were reviewed and are negative, except as noted.    VITALS/PHYSICAL EXAM  --------------------------------------------------------------------------------  T(C): 37.1 (02-17-19 @ 13:25), Max: 39.1 (02-16-19 @ 19:08)  HR: 90 (02-17-19 @ 13:25) (90 - 114)  BP: 117/72 (02-17-19 @ 13:25) (117/72 - 161/75)  RR: 18 (02-17-19 @ 13:25) (17 - 20)  SpO2: 100% (02-17-19 @ 11:25) (96% - 100%)  Wt(kg): --    Physical Exam:  	Gen: NAD, well-appearing  	HEENT: No supplemental O2  	Pulm: CTA B/L  	CV: normal S1S2; no rub  	Abd: soft  	: No miller  	LE: No edema  	Skin: Warm, without rashes    LABS/STUDIES  --------------------------------------------------------------------------------              6.7    8.40  >-----------<  312      [02-17-19 @ 06:30]              21.9     141  |  106  |  58  ----------------------------<  185      [02-17-19 @ 06:30]  3.9   |  22  |  1.98        Ca     9.2     [02-17-19 @ 06:30]      Mg     1.7     [02-17-19 @ 06:30]      Phos  3.1     [02-17-19 @ 06:30]    Creatinine Trend:  SCr 1.98 [02-17 @ 06:30]  SCr 1.99 [02-16 @ 08:15]  SCr 2.36 [02-15 @ 05:44]  SCr 2.83 [02-14 @ 05:45]  SCr 3.23 [02-13 @ 05:30]    Urinalysis - [02-16-19 @ 22:30]      Color LIGHT YELLOW / Appearance CLEAR / SG 1.012 / pH 6.0      Gluc NEGATIVE / Ketone NEGATIVE  / Bili NEGATIVE / Urobili NORMAL       Blood NEGATIVE / Protein 20 / Leuk Est NEGATIVE / Nitrite NEGATIVE      RBC 0-2 / WBC 0-2 / Hyaline 1+ / Gran  / Sq Epi FEW / Non Sq Epi  / Bacteria NEGATIVE

## 2019-02-17 NOTE — PROGRESS NOTE ADULT - SUBJECTIVE AND OBJECTIVE BOX
Shawanda Lowery MD  Pager [phone]    CHIEF COMPLAINT: Patient is a 74y old  female who presents with a chief complaint of Syncopal Episode (2019 15:17)      SUBJECTIVE / OVERNIGHT EVENTS:  pt feels ok, hgb low 6.7 this am, will get 1 unit blood transfusion, still with loose BM    MEDICATIONS  (STANDING):  chlorhexidine 4% Liquid 1 Application(s) Topical <User Schedule>  dextrose 5%. 1000 milliLiter(s) (50 mL/Hr) IV Continuous <Continuous>  dextrose 50% Injectable 12.5 Gram(s) IV Push once  dextrose 50% Injectable 25 Gram(s) IV Push once  dextrose 50% Injectable 25 Gram(s) IV Push once  epoetin diogo Injectable 01890 Unit(s) SubCutaneous <User Schedule>  insulin lispro (HumaLOG) corrective regimen sliding scale   SubCutaneous at bedtime  insulin lispro (HumaLOG) corrective regimen sliding scale   SubCutaneous three times a day before meals  levoFLOXacin  Tablet 250 milliGRAM(s) Oral every 24 hours  loperamide 2 milliGRAM(s) Oral every 6 hours  1.	sodium bicarbonate 650 milliGRAM(s) Oral three times a day  sodium bicarbonate  Infusion 0.039 mEq/kG/Hr (40 mL/Hr) IV Continuous <Continuous>  sucralfate 1 Gram(s) Oral four times a day    MEDICATIONS  (PRN):  acetaminophen   Tablet .. 650 milliGRAM(s) Oral every 6 hours PRN Mild Pain (1 - 3)  acetaminophen   Tablet .. 650 milliGRAM(s) Oral every 6 hours PRN Temp greater or equal to 38C (100.4F)  dextrose 40% Gel 15 Gram(s) Oral once PRN Blood Glucose LESS THAN 70 milliGRAM(s)/deciliter  glucagon  Injectable 1 milliGRAM(s) IntraMuscular once PRN Glucose LESS THAN 70 milligrams/deciliter      VITALS:  T(F): 99.9 (19 @ 10:25), Max: 102.3 (19 @ 19:08)  HR: 90 (19 @ 10:25) (90 - 114)  BP: 123/76 (19 @ 10:25) (123/76 - 161/75)  RR: 17 (19 @ 10:25) (17 - 20)  SpO2: 96% (19 @ 09:55)      CAPILLARY BLOOD GLUCOSE    Output     I&O's Summary  T(F): 99.9 (19 @ 10:25), Max: 102.3 (19 @ 19:08)  HR: 90 (19 @ 10:25) (90 - 114)  BP: 123/76 (19 @ 10:25) (123/76 - 161/75)  RR: 17 (19 @ 10:25) (17 - 20)  SpO2: 96% (19 @ 09:55)    PHYSICAL EXAM:  GENERAL: NAD, well-developed  HEAD:  Atraumatic, Normocephalic  EYES: EOMI, PERRLA, conjunctiva and sclera clear  NECK: Supple, No JVD  CHEST/LUNG: Clear to auscultation bilaterally; No wheeze  HEART: Regular rate and rhythm; No murmurs, rubs, or gallops  ABDOMEN: Soft, Nontender, Nondistended; Bowel sounds present  EXTREMITIES:  2+ Peripheral Pulses, No clubbing, cyanosis, or edema  PSYCH: AAOx3  NEUROLOGY: non-focal  SKIN: No rashes or lesions  Flex seal in place- liquid diarrhea    LABS:              6.7                  141  | 22   | 58           8.40  >-----------< 312     ------------------------< 185                   21.9                 3.9  | 106  | 1.98                                         Ca 9.2   Mg 1.7   Ph 3.1         TPro  6.6  /  Alb  3.1      TBili  0.4  /  DBili  x         AST  29  /  ALT  35            AlkPhos  119            Urinalysis Basic - ( 2019 22:30 )    Color: LIGHT YELLOW / Appearance: CLEAR / S.012 / pH: 6.0  Gluc: NEGATIVE / Ketone: NEGATIVE  / Bili: NEGATIVE / Urobili: NORMAL   Blood: NEGATIVE / Protein: 20 / Nitrite: NEGATIVE   Leuk Esterase: NEGATIVE / RBC: 0-2 / WBC 0-2   Sq Epi: FEW / Non Sq Epi: x / Bacteria: NEGATIVE            MICROBIOLOGY:        RADIOLOGY & ADDITIONAL TESTS:    Imaging Personally Reviewed:    [ ] Consultant(s) Notes Reviewed:  [ ] Care Discussed with Consultants/Other Providers:

## 2019-02-17 NOTE — DISCHARGE NOTE ADULT - PATIENT PORTAL LINK FT
You can access the [facility], offered by [facility], by registering with the following website: [email or web address]

## 2019-02-17 NOTE — DISCHARGE NOTE ADULT - CARE PLAN
Principal Discharge DX:	Acute respiratory failure with hypoxia  Goal:	resolved  Assessment and plan of treatment:	RLL opacity on CXR  Secondary Diagnosis:	HCAP (healthcare-associated pneumonia)  Goal:	resolved  Assessment and plan of treatment:	You were treated for Legionella Pneumonia, last day of Levaquin 2/23/2019  Secondary Diagnosis:	ISABELLA (acute kidney injury)  Secondary Diagnosis:	Diabetes mellitus, type 2  Goal:	Glucose control  Secondary Diagnosis:	Diarrhea  Goal:	symptoms control  Assessment and plan of treatment:	-neg C-diff .  You can use  loperamide as needed and imodium.    Continue carafate Principal Discharge DX:	Acute respiratory failure with hypoxia  Goal:	resolved  Assessment and plan of treatment:	RLL opacity on CXR- secondary to legonela pNA- complete levaquin  Secondary Diagnosis:	HCAP (healthcare-associated pneumonia)  Goal:	resolved  Assessment and plan of treatment:	You were treated for Legionella Pneumonia, last day of Levaquin 2/23/2019  Secondary Diagnosis:	ISABELLA (acute kidney injury)  Assessment and plan of treatment:	resolved  Secondary Diagnosis:	Diabetes mellitus, type 2  Goal:	Glucose control  Secondary Diagnosis:	Diarrhea  Goal:	symptoms control  Assessment and plan of treatment:	-neg C-diff .  You can use  loperamide as needed and imodium.    Continue carafate Principal Discharge DX:	Acute respiratory failure with hypoxia  Goal:	resolved  Assessment and plan of treatment:	RLL opacity on CXR- secondary to legonela pNA- complete levaquin  Secondary Diagnosis:	HCAP (healthcare-associated pneumonia)  Goal:	resolved  Assessment and plan of treatment:	You were treated for Legionella Pneumonia, last day of Levaquin 2/23/2019  Secondary Diagnosis:	ISABELLA (acute kidney injury)  Assessment and plan of treatment:	resolved  Secondary Diagnosis:	Diabetes mellitus, type 2  Goal:	Glucose control  Secondary Diagnosis:	Diarrhea  Goal:	symptoms control  Assessment and plan of treatment:	-neg C-diff .  You can use  loperamide as needed and imodium.    Continue Carafate.  No milk products- milk/ cheese / yogart Principal Discharge DX:	Acute respiratory failure with hypoxia  Goal:	resolved  Assessment and plan of treatment:	RLL opacity on CXR- secondary to legionella pNA- complete levaquin upon discharge. Follow up with PCP within 1 week of discharge.  Secondary Diagnosis:	HCAP (healthcare-associated pneumonia)  Goal:	resolved  Assessment and plan of treatment:	You were treated for Legionella Pneumonia, please complete course of antibiotics (last day of Levaquin 2/23/2019). Follow up with PCP within 1 week of discharge.  Secondary Diagnosis:	ISABELLA (acute kidney injury)  Assessment and plan of treatment:	Resolved. Remain well hydrated.  Secondary Diagnosis:	Diabetes mellitus, type 2  Goal:	Glucose control  Assessment and plan of treatment:	Continue consistent carbohydrate diet.  Monitor blood glucose levels throughout the day before meals and at bedtime.  Record blood sugars and bring to outpatient providers appointment in order to be reviewed by your doctor for management modifications.  Be aware of diabetes management symptoms including feeling cool and clammy may be related to low glucose levels.  Feeling hot and dry may indicate high glucose levels.  If  you feel these symptoms, check your blood sugar.  Make regular podiatry appointments in order to have feet checked for wounds and toe nails cut by a doctor to prevent infections.  Secondary Diagnosis:	Diarrhea  Goal:	symptoms control  Assessment and plan of treatment:	You can use  loperamide as needed and imodium. Remain well hydrated. Continue Carafate. No milk products- milk/ cheese / yogart

## 2019-02-17 NOTE — PROGRESS NOTE ADULT - SUBJECTIVE AND OBJECTIVE BOX
HPI:  Feels weak.Still has diarrhea    REVIEW OF SYSTEMS:    CONSTITUTIONAL: pos weakness, no fevers or chills, weight loss  EYES/ENT: No visual changes, no throat pain   RESPIRATORY: No cough, wheezing, hemoptysis; No shortness of breath  CARDIOVASCULAR: No chest pain or palpitations  GASTROINTESTINAL: No abdominal, nausea, vomiting, or hematemesis; No diarrhea or constipation. No melena or hematochezia.  GENITOURINARY: No dysuria, frequency or hematuria  NEUROLOGICAL: No dizziness, numbness, or weakness  SKIN: No itching, burning, rashes, or lesions   All other review of systems is negative unless indicated above.    VITAL SIGNS:    T 98.2, P98, /91 RR18    PHYSICAL EXAM:     GENERAL: no acute distress  HEENT: NC/AT, EOMI, neck supple, MMM  RESPIRATORY: LCTAB/L, no rhonchi, rales, or wheezing  CARDIOVASCULAR: RRR, no murmurs, gallops, rubs  ABDOMINAL: soft, non-tender, non-distended, positive bowel sounds   NEUROLOGICAL: alert and oriented x 3, non-focal  LYMPHATIC: lymphatic: cervical, supraclavicular, axilla, inguinal  SKIN: no rashes or lesions   MUSCULOSKELETAL: no gross joint deformity                          7.6    7.67  )-----------( 261      ( 17 Feb 2019 14:30 )             23.6     02-17    141  |  106  |  58<H>  ----------------------------<  185<H>  3.9   |  22  |  1.98<H>    Ca    9.2      17 Feb 2019 06:30  Phos  3.1     02-17  Mg     1.7     02-17    TPro  6.6  /  Alb  3.1<L>  /  TBili  0.4  /  DBili  x   /  AST  29  /  ALT  35<H>  /  AlkPhos  119  02-16      MEDICATIONS  (STANDING):  chlorhexidine 4% Liquid 1 Application(s) Topical <User Schedule>  dextrose 5%. 1000 milliLiter(s) (50 mL/Hr) IV Continuous <Continuous>  dextrose 50% Injectable 12.5 Gram(s) IV Push once  dextrose 50% Injectable 25 Gram(s) IV Push once  dextrose 50% Injectable 25 Gram(s) IV Push once  epoetin diogo Injectable 40563 Unit(s) SubCutaneous <User Schedule>  insulin lispro (HumaLOG) corrective regimen sliding scale   SubCutaneous at bedtime  insulin lispro (HumaLOG) corrective regimen sliding scale   SubCutaneous three times a day before meals  lactated ringers. 500 milliLiter(s) (40 mL/Hr) IV Continuous <Continuous>  levoFLOXacin  Tablet 250 milliGRAM(s) Oral every 24 hours  loperamide 2 milliGRAM(s) Oral every 6 hours  sodium bicarbonate 650 milliGRAM(s) Oral three times a day  sucralfate 1 Gram(s) Oral four times a day

## 2019-02-17 NOTE — DISCHARGE NOTE ADULT - HOSPITAL COURSE
Statement Selected 2/12:  Transfer from MICU to [facility]    Sepsis/HCAP  -RLL opacity on CXR   -Legonella PNA-> levaquin until 2/23.     Acute respiratory failure -resolved  -Now off bipap.     Diarrhea  -neg Cdiff .  - loperamide q6h, carafate and imodium.     Pancytopenia  - 2/17 hgb to 6.7-s/p 1unit PRBC***.     Syncope  - monitor BP  Am cortisol level normal 15.    ISABELLA on CKD w/metabolic acidosis from diarrhea likely ATN in the setting of sepsis and  contrast exposure   -Cr at admission 2.35, with a baseline of ~1.3  - on NaHCO3 drip    Type 2 Diabetes mellitus-HbA1C 7.6  -home glimepride 1 mg, on hold   - low dose insulin sliding scale with FS premeal and qhs.     Malignant neoplasm of right breast   - pt's onc Dr. Ruiz ([phone]), does not come to [facility]  - per [facility]- continue Neupogen.     Hypotention  -resolved Pt is a 75yo F with PMH of breast CA (dx with malignant neoplasm of right female breast and intraductal carcinoma in situ of left breast) on chemo (last treatment 8 days ago), CKD, HTN, DMT2 (not on home insulin) presenting s/p syncopal episode. Pt was found down and disorientated this AM by her daughter covered in feces, requiring assistance to stand up. Daughter denied abnormal movements, unknown if urinary incontinence, denied tongue bitting. The pt does not remember the events surrounding the fall, although believes she was probably getting out of bed based on location. When EMS arrived, she was hypoxic with SpO2 of 82% and A+Ox2. She has had 4 episodes of watery diarrhea, decreased appetite, and chills starting yesterday. She has also had a lingering dry cough for the past couple of days. Denied fevers, dizziness, blurry vision, congestion, SOB, chest pain, abdominal pain, n/v/c, muscle weakness, pain.     In the ED: Pt was febrile (39.4-39.7 C), tachycardic (111-124), RR of 20, and normotensive (SBP 140s). Labs showed severe leukopenia (WBC 0.36), with neutropenia (0.01 K/ul), anemia, ISABELLA, high lactate (2.9). She was given 2L NS, cefepime vanco, APAP and ibuprofen.   Patient was treated in MICU for respiratory distress and antibiotics continued. She was treated with BIPAP for Respiratory distress.  Her pancytopenia resolved. She received PRBC for Hgb 6.7  She had positive Legionella antigen and was noted to have RLL PNA sec to Legionella  Vanco and Cefepime was stopped.  She was transferred to medicine floor Off BiPAP  She has resolved sepsis- was still on Iv Azithromycin and  had a Flexiseal in place.  Id noted her infectious diarrhea w/u was negative and she was able to be started on Imodium and carafated q6.  Id recommended changing antibiotics to Levaquin until 2/23/19 , since azytho can cause diarrhea.    Sepsis/HCAP  -RLL opacity on CXR   -Legonella PNA-> levaquin until 2/23.     Acute respiratory failure -resolved  -Now off bipap.     Diarrhea  -neg Cdiff .  - loperamide q6h, carafate and imodium.     Pancytopenia  - 2/17 hgb to 6.7-s/p 1unit PRBC***.     Syncope  - monitor BP  Am cortisol level normal 15.    ISABELLA on CKD w/metabolic acidosis from diarrhea likely ATN in the setting of sepsis and  contrast exposure   -Cr at admission 2.35, with a baseline of ~1.3  - on NaHCO3 drip    Type 2 Diabetes mellitus-HbA1C 7.6  -home glimepride 1 mg, on hold   - low dose insulin sliding scale with FS premeal and qhs.     Malignant neoplasm of right breast   - pt's onc Dr. Ruiz ([phone]), does not come to [facility]  - per [place] [facility]- continue Neupogen.     Hypotention  -resolved Pt is a 73yo F with PMH of breast CA (dx with malignant neoplasm of right female breast and intraductal carcinoma in situ of left breast) on chemo (last treatment 8 days ago), CKD, HTN, DMT2 (not on home insulin) presenting s/p syncopal episode. Pt was found down and disorientated this AM by her daughter covered in feces, requiring assistance to stand up. Daughter denied abnormal movements, unknown if urinary incontinence, denied tongue bitting. The pt does not remember the events surrounding the fall, although believes she was probably getting out of bed based on location. When EMS arrived, she was hypoxic with SpO2 of 82% and A+Ox2. She has had 4 episodes of watery diarrhea, decreased appetite, and chills starting yesterday. She has also had a lingering dry cough for the past couple of days. Denied fevers, dizziness, blurry vision, congestion, SOB, chest pain, abdominal pain, n/v/c, muscle weakness, pain.     In the ED: Pt was febrile (39.4-39.7 C), tachycardic (111-124), RR of 20, and normotensive (SBP 140s). Labs showed severe leukopenia (WBC 0.36), with neutropenia (0.01 K/ul), anemia, ISABELLA, high lactate (2.9). She was given 2L NS, cefepime vanco, APAP and ibuprofen.   Patient was treated in MICU for respiratory distress and antibiotics continued. She was treated with BIPAP for Respiratory distress.  Her pancytopenia resolved. She received PRBC for Hgb 6.7  She had positive Legionella antigen and was noted to have RLL PNA sec to Legionella  Vanco and Cefepime was stopped.  She was transferred to medicine floor Off BiPAP  She has resolved sepsis- was still on Iv Azithromycin and  had a Flexiseal in place.  Id noted her infectious diarrhea w/u was negative and she was able to be started on Imodium and carafated q6.  Id recommended changing antibiotics to Levaquin until 2/23/19 , since azytho can cause diarrhea.  Oncology was called- Dr Agee noted her diarrhea was franklin secondary to chemo agents- she should follow up with Dr Ruiz - Oncology.  She had improvement of her ISABELLA with creat improved fro 2.3 to 1.3. She was seen by renal and treated with bipap drip with improved Creatnine  She had normal Am cortisol.  Type 2 Diabetes mellitus-HbA1C 7.6, home glimepride 1 mg, on hold , she was treated with low dose insulin sliding scale with FS premeal and qhs. She was able tro resume her home medications on discharge.  Her stool was noted to be more formed and she was able to have flexiseal removed and d/c to home on 2/20/19 to f/u out patient with PCP and oncology for Malignant neoplasm of right breast, with pt's onc Dr. Ruiz ([phone]), does not come to [facility] Pt is a 75yo F with PMH of breast CA (dx with malignant neoplasm of right female breast and intraductal carcinoma in situ of left breast) on chemo (last treatment 8 days ago), CKD, HTN, DMT2 (not on home insulin) presenting s/p syncopal episode. Pt was found down and disorientated this AM by her daughter covered in feces, requiring assistance to stand up. Daughter denied abnormal movements, unknown if urinary incontinence, denied tongue bitting. The pt does not remember the events surrounding the fall, although believes she was probably getting out of bed based on location. When EMS arrived, she was hypoxic with SpO2 of 82% and A+Ox2. She has had 4 episodes of watery diarrhea, decreased appetite, and chills starting yesterday. She has also had a lingering dry cough for the past couple of days. Denied fevers, dizziness, blurry vision, congestion, SOB, chest pain, abdominal pain, n/v/c, muscle weakness, pain.     In the ED: Pt was febrile (39.4-39.7 C), tachycardic (111-124), RR of 20, and normotensive (SBP 140s). Labs showed severe leukopenia (WBC 0.36), with neutropenia (0.01 K/ul), anemia, ISABELLA, high lactate (2.9). She was given 2L NS, cefepime vanco, APAP and ibuprofen.   Patient was treated in MICU for respiratory distress and antibiotics continued. She was treated with BIPAP for Respiratory distress.  Her pancytopenia resolved. She received PRBC for Hgb 6.7  She had positive Legionella antigen and was noted to have RLL PNA sec to Legionella  Vanco and Cefepime was stopped.  She was transferred to medicine floor Off BiPAP  She has resolved sepsis- was still on Iv Azithromycin and  had a Flexiseal in place.  Id noted her infectious diarrhea w/u was negative and she was able to be started on Imodium and carafated q6.  Id recommended changing antibiotics to Levaquin until 2/23/19 , since azytho can cause diarrhea.  Oncology was called- Dr Agee noted her diarrhea was not secondary to chemo agents- she should follow up with Dr Ruiz - Oncology.  She had improvement of her ISABELLA with creat improved fro 2.3 to 1.3. She was seen by renal and treated with bicarb drip with improved Creatnine  She had normal Am cortisol.  Type 2 Diabetes mellitus-HbA1C 7.6, home glimepride 1 mg, on hold , she was treated with low dose insulin sliding scale with FS premeal and qhs. She was able tro resume her home medications on discharge.  Her stool was noted to be more formed and she was able to have flexiseal removed and d/c to home on 2/21/19 to f/u out patient with PCP and oncology for Malignant neoplasm of right breast, with pt's onc Dr. Ruiz ([phone]), does not come to [facility]

## 2019-02-17 NOTE — PROGRESS NOTE ADULT - PROBLEM SELECTOR PLAN 9
-Hx of malignant neoplasm of right female breast and intraductal carcinoma in situ of left breast) on chemo (last treatment 1/29/19, final treatment of cyclophosphamide)  -Spoke with Dr. Ruiz ([phone]), does not come to [facility]  -As per house Onc; will continue Neupogen.

## 2019-02-17 NOTE — PROGRESS NOTE ADULT - PROBLEM SELECTOR PLAN 1
Pt with baseline SCr 1.2-1.6 admitted with elevated SCr to 2.35 (2/5/19). Scr however improved to 1.6 on 2/7/19. Pt had a CT with IV contrast on 2/7/19. Scr elevated to 3.7 (2/10/19). Pt received two doses of Filgrastim during her stay. She also had one dose of ibruprofen. Pt with possible hemodynamic ISABELLA in the setting of IV contrast, PNA and NSAIDs. Scr elevated to 4.1 (2/11/19). Scr improved to 1.99 today. Pt non-oliguric. UA with  blood and protein on 2/6.  Pt with 0.9g proteinuria on spot urine exam. Hepatitis C antibody negative. C3 and C4 WNL. EMELI, RPR, ANCA, anti-GBM, dsDNA, HBSAg are negative. SIFE reveal no monoclonal band. Pt. likely with ATN. Monitor BMP. Strict I/Os. Avoid nephrotoxins. Renally dose all medications Pt with baseline SCr 1.2-1.6 admitted with elevated SCr to 2.35 (2/5/19). Scr however improved to 1.6 on 2/7/19. Pt had a CT with IV contrast on 2/7/19. Scr elevated to 3.7 (2/10/19). Pt received two doses of Filgrastim during her stay. She also had one dose of ibruprofen. Pt with possible hemodynamic ISABELLA in the setting of IV contrast, PNA and NSAIDs. Scr elevated to 4.1 (2/11/19). Scr improved/stable to 1.98 today. Pt non-oliguric. UA with  blood and protein on 2/6.  Pt with 0.9g proteinuria on spot urine exam. Hepatitis C antibody negative. C3 and C4 WNL. EMELI, RPR, ANCA, anti-GBM, dsDNA, HBSAg are negative. SIFE reveal no monoclonal band. Pt. likely with ATN. Monitor BMP. Strict I/Os. Avoid nephrotoxins. Renally dose all medications

## 2019-02-18 LAB
ANION GAP SERPL CALC-SCNC: 15 MMO/L — HIGH (ref 7–14)
BASOPHILS # BLD AUTO: 0.04 K/UL — SIGNIFICANT CHANGE UP (ref 0–0.2)
BASOPHILS NFR BLD AUTO: 0.7 % — SIGNIFICANT CHANGE UP (ref 0–2)
BUN SERPL-MCNC: 46 MG/DL — HIGH (ref 7–23)
CALCIUM SERPL-MCNC: 9.3 MG/DL — SIGNIFICANT CHANGE UP (ref 8.4–10.5)
CHLORIDE SERPL-SCNC: 99 MMOL/L — SIGNIFICANT CHANGE UP (ref 98–107)
CO2 SERPL-SCNC: 24 MMOL/L — SIGNIFICANT CHANGE UP (ref 22–31)
CREAT SERPL-MCNC: 1.67 MG/DL — HIGH (ref 0.5–1.3)
EOSINOPHIL # BLD AUTO: 0.01 K/UL — SIGNIFICANT CHANGE UP (ref 0–0.5)
EOSINOPHIL NFR BLD AUTO: 0.2 % — SIGNIFICANT CHANGE UP (ref 0–6)
GLUCOSE BLDC GLUCOMTR-MCNC: 146 MG/DL — HIGH (ref 70–99)
GLUCOSE BLDC GLUCOMTR-MCNC: 150 MG/DL — HIGH (ref 70–99)
GLUCOSE BLDC GLUCOMTR-MCNC: 189 MG/DL — HIGH (ref 70–99)
GLUCOSE BLDC GLUCOMTR-MCNC: 199 MG/DL — HIGH (ref 70–99)
GLUCOSE SERPL-MCNC: 151 MG/DL — HIGH (ref 70–99)
HCT VFR BLD CALC: 24.7 % — LOW (ref 34.5–45)
HGB BLD-MCNC: 8 G/DL — LOW (ref 11.5–15.5)
IMM GRANULOCYTES NFR BLD AUTO: 0.7 % — SIGNIFICANT CHANGE UP (ref 0–1.5)
LYMPHOCYTES # BLD AUTO: 0.79 K/UL — LOW (ref 1–3.3)
LYMPHOCYTES # BLD AUTO: 13.6 % — SIGNIFICANT CHANGE UP (ref 13–44)
MAGNESIUM SERPL-MCNC: 1.5 MG/DL — LOW (ref 1.6–2.6)
MCHC RBC-ENTMCNC: 28.7 PG — SIGNIFICANT CHANGE UP (ref 27–34)
MCHC RBC-ENTMCNC: 32.4 % — SIGNIFICANT CHANGE UP (ref 32–36)
MCV RBC AUTO: 88.5 FL — SIGNIFICANT CHANGE UP (ref 80–100)
MONOCYTES # BLD AUTO: 0.54 K/UL — SIGNIFICANT CHANGE UP (ref 0–0.9)
MONOCYTES NFR BLD AUTO: 9.3 % — SIGNIFICANT CHANGE UP (ref 2–14)
NEUTROPHILS # BLD AUTO: 4.4 K/UL — SIGNIFICANT CHANGE UP (ref 1.8–7.4)
NEUTROPHILS NFR BLD AUTO: 75.5 % — SIGNIFICANT CHANGE UP (ref 43–77)
NRBC # FLD: 0 K/UL — LOW (ref 25–125)
PHOSPHATE SERPL-MCNC: 3.9 MG/DL — SIGNIFICANT CHANGE UP (ref 2.5–4.5)
PLATELET # BLD AUTO: 271 K/UL — SIGNIFICANT CHANGE UP (ref 150–400)
PMV BLD: 9.8 FL — SIGNIFICANT CHANGE UP (ref 7–13)
POTASSIUM SERPL-MCNC: 3.6 MMOL/L — SIGNIFICANT CHANGE UP (ref 3.5–5.3)
POTASSIUM SERPL-SCNC: 3.6 MMOL/L — SIGNIFICANT CHANGE UP (ref 3.5–5.3)
RBC # BLD: 2.79 M/UL — LOW (ref 3.8–5.2)
RBC # FLD: 19 % — HIGH (ref 10.3–14.5)
SODIUM SERPL-SCNC: 138 MMOL/L — SIGNIFICANT CHANGE UP (ref 135–145)
WBC # BLD: 5.82 K/UL — SIGNIFICANT CHANGE UP (ref 3.8–10.5)
WBC # FLD AUTO: 5.82 K/UL — SIGNIFICANT CHANGE UP (ref 3.8–10.5)

## 2019-02-18 PROCEDURE — 99232 SBSQ HOSP IP/OBS MODERATE 35: CPT | Mod: GC

## 2019-02-18 PROCEDURE — 99233 SBSQ HOSP IP/OBS HIGH 50: CPT

## 2019-02-18 RX ORDER — ONDANSETRON 8 MG/1
4 TABLET, FILM COATED ORAL ONCE
Qty: 0 | Refills: 0 | Status: DISCONTINUED | OUTPATIENT
Start: 2019-02-18 | End: 2019-02-20

## 2019-02-18 RX ORDER — MAGNESIUM SULFATE 500 MG/ML
1 VIAL (ML) INJECTION ONCE
Qty: 0 | Refills: 0 | Status: COMPLETED | OUTPATIENT
Start: 2019-02-18 | End: 2019-02-18

## 2019-02-18 RX ADMIN — Medication 650 MILLIGRAM(S): at 11:35

## 2019-02-18 RX ADMIN — Medication 2 MILLIGRAM(S): at 19:27

## 2019-02-18 RX ADMIN — Medication 2 MILLIGRAM(S): at 11:34

## 2019-02-18 RX ADMIN — Medication 1 GRAM(S): at 19:28

## 2019-02-18 RX ADMIN — Medication 1 GRAM(S): at 23:11

## 2019-02-18 RX ADMIN — Medication 650 MILLIGRAM(S): at 06:19

## 2019-02-18 RX ADMIN — Medication 100 GRAM(S): at 11:35

## 2019-02-18 RX ADMIN — Medication 1 GRAM(S): at 06:19

## 2019-02-18 RX ADMIN — Medication 1 GRAM(S): at 11:35

## 2019-02-18 RX ADMIN — Medication 2 MILLIGRAM(S): at 23:11

## 2019-02-18 RX ADMIN — Medication 650 MILLIGRAM(S): at 00:00

## 2019-02-18 RX ADMIN — Medication 650 MILLIGRAM(S): at 23:11

## 2019-02-18 RX ADMIN — Medication 2 MILLIGRAM(S): at 06:19

## 2019-02-18 RX ADMIN — CHLORHEXIDINE GLUCONATE 1 APPLICATION(S): 213 SOLUTION TOPICAL at 11:35

## 2019-02-18 NOTE — PROGRESS NOTE ADULT - PROBLEM SELECTOR PLAN 2
Originally with hyperchloremic metabolic acidosis, now resolved after bicarbonate gtt started by primary team. Transitioned to sodium bicarbonate PO. monitor serum CO2 Originally with hyperchloremic metabolic acidosis, now resolved after bicarbonate gtt started by primary team. Transitioned to sodium bicarbonate PO. monitor serum CO2.  Can discontinue oral bicarbonate if serum levels remain stable and monitor thereafter.

## 2019-02-18 NOTE — PROGRESS NOTE ADULT - PROBLEM SELECTOR PLAN 7
-ISABELLA on CKD likely ATN in the setting of sepsis and now contrast exposure   -Cr at admission 2.35, with a baseline of ~1.3, now down to 1.6  -met acidosis resolved, off NaHCO3 drip, c/w LR, nephrology f/u appreciated  -mild proteinuria 0.9 g on spot urine, serologies negative, complement normal, f/u nephrology recs

## 2019-02-18 NOTE — PROGRESS NOTE ADULT - SUBJECTIVE AND OBJECTIVE BOX
Shawanda Lowery MD  Pager [phone]    CHIEF COMPLAINT: Patient is a 74y old  female who presents with a chief complaint of Syncopal Episode (2019 22:21)      SUBJECTIVE / OVERNIGHT EVENTS:  pt reports diarrhea improved, no chest pain/sob    MEDICATIONS  (STANDING):  chlorhexidine 4% Liquid 1 Application(s) Topical <User Schedule>  dextrose 5%. 1000 milliLiter(s) (50 mL/Hr) IV Continuous <Continuous>  dextrose 50% Injectable 12.5 Gram(s) IV Push once  dextrose 50% Injectable 25 Gram(s) IV Push once  dextrose 50% Injectable 25 Gram(s) IV Push once  epoetin diogo Injectable 78386 Unit(s) SubCutaneous <User Schedule>  insulin lispro (HumaLOG) corrective regimen sliding scale   SubCutaneous at bedtime  insulin lispro (HumaLOG) corrective regimen sliding scale   SubCutaneous three times a day before meals  lactated ringers. 500 milliLiter(s) (40 mL/Hr) IV Continuous <Continuous>  levoFLOXacin  Tablet 250 milliGRAM(s) Oral every 24 hours  loperamide 2 milliGRAM(s) Oral every 6 hours  ondansetron    Tablet 4 milliGRAM(s) Oral once  sodium bicarbonate 650 milliGRAM(s) Oral three times a day  sucralfate 1 Gram(s) Oral four times a day    MEDICATIONS  (PRN):  acetaminophen   Tablet .. 650 milliGRAM(s) Oral every 6 hours PRN Mild Pain (1 - 3)  acetaminophen   Tablet .. 650 milliGRAM(s) Oral every 6 hours PRN Temp greater or equal to 38C (100.4F)  dextrose 40% Gel 15 Gram(s) Oral once PRN Blood Glucose LESS THAN 70 milliGRAM(s)/deciliter  glucagon  Injectable 1 milliGRAM(s) IntraMuscular once PRN Glucose LESS THAN 70 milligrams/deciliter      VITALS:  T(F): 97.7 (19 @ 06:30), Max: 101 (19 @ 22:15)  HR: 90 (19 @ 06:30) (90 - 99)  BP: 154/92 (19 @ 06:30) (117/72 - 154/92)  RR: 18 (19 @ 06:30) (18 - 18)  SpO2: 100% (19 @ 06:30)      CAPILLARY BLOOD GLUCOSE    Output     I&O's Summary  T(F): 97.7 (19 @ 06:30), Max: 101 (19 @ 22:15)  HR: 90 (19 @ 06:30) (90 - 99)  BP: 154/92 (19 @ 06:30) (117/72 - 154/92)  RR: 18 (19 @ 06:30) (18 - 18)  SpO2: 100% (19 @ 06:30)    PHYSICAL EXAM:  GENERAL: NAD, well-developed  HEAD:  Atraumatic, Normocephalic  EYES: EOMI, PERRLA, conjunctiva and sclera clear  NECK: Supple, No JVD  CHEST/LUNG: Clear to auscultation bilaterally; No wheeze, +port  HEART: Regular rate and rhythm; No murmurs, rubs, or gallops  ABDOMEN: Soft, Nontender, Nondistended; Bowel sounds present  EXTREMITIES:  2+ Peripheral Pulses, No clubbing, cyanosis, or edema  PSYCH: AAOx3  NEUROLOGY: non-focal  SKIN: No rashes or lesions  Flex seal in place- loose bm      LABS:              8.0                  138  | 24   | 46           5.82  >-----------< 271     ------------------------< 151                   24.7                 3.6  | 99   | 1.67                                         Ca 9.3   Mg 1.5   Ph 3.9                Urinalysis Basic - ( 2019 22:30 )    Color: LIGHT YELLOW / Appearance: CLEAR / S.012 / pH: 6.0  Gluc: NEGATIVE / Ketone: NEGATIVE  / Bili: NEGATIVE / Urobili: NORMAL   Blood: NEGATIVE / Protein: 20 / Nitrite: NEGATIVE   Leuk Esterase: NEGATIVE / RBC: 0-2 / WBC 0-2   Sq Epi: FEW / Non Sq Epi: x / Bacteria: NEGATIVE            MICROBIOLOGY:    Culture - Blood (collected 2019 20:42)  Source: BLOOD VENOUS  Preliminary Report (2019 20:40):    NO ORGANISMS ISOLATED    NO ORGANISMS ISOLATED AT 24 HOURS    Culture - Blood (collected 2019 20:42)  Source: BLOOD PERIPHERAL  Preliminary Report (2019 20:40):    NO ORGANISMS ISOLATED    NO ORGANISMS ISOLATED AT 24 HOURS          RADIOLOGY & ADDITIONAL TESTS:    Imaging Personally Reviewed:    [ ] Consultant(s) Notes Reviewed:  [ ] Care Discussed with Consultants/Other Providers:

## 2019-02-18 NOTE — PROGRESS NOTE ADULT - PROBLEM SELECTOR PLAN 4
-Stool studies negative, neg Cdiff .  -diarrhea improved on imodium, carafate  -supportive care per GI, loperamide q6h  -monitor stool

## 2019-02-18 NOTE — PROGRESS NOTE ADULT - PROBLEM SELECTOR PLAN 9
-Hx of malignant neoplasm of right female breast and intraductal carcinoma in situ of left breast) on chemo (last treatment 1/29/19, final treatment of cyclophosphamide)  -[facility] f/u appreciated

## 2019-02-18 NOTE — PROGRESS NOTE ADULT - SUBJECTIVE AND OBJECTIVE BOX
[facility] DIVISION OF KIDNEY DISEASES AND HYPERTENSION -- FOLLOW UP NOTE  --------------------------------------------------------------------------------  Chief complaint: ISABELLA    24 hour events/subjective: Patient seen and examined at bedside this AM. Patient transfused PRBC yesterday. Patient states that she feels well and denies SOB.    PAST HISTORY  --------------------------------------------------------------------------------  No significant changes to PMH, PSH, FHx, SHx, unless otherwise noted    ALLERGIES & MEDICATIONS  --------------------------------------------------------------------------------  Allergies    No Known Allergies    Intolerances    codeine (Nausea)    Standing Inpatient Medications  chlorhexidine 4% Liquid 1 Application(s) Topical <User Schedule>  dextrose 5%. 1000 milliLiter(s) IV Continuous <Continuous>  dextrose 50% Injectable 12.5 Gram(s) IV Push once  dextrose 50% Injectable 25 Gram(s) IV Push once  dextrose 50% Injectable 25 Gram(s) IV Push once  epoetin diogo Injectable 48743 Unit(s) SubCutaneous <User Schedule>  insulin lispro (HumaLOG) corrective regimen sliding scale   SubCutaneous at bedtime  insulin lispro (HumaLOG) corrective regimen sliding scale   SubCutaneous three times a day before meals  lactated ringers. 500 milliLiter(s) IV Continuous <Continuous>  levoFLOXacin  Tablet 250 milliGRAM(s) Oral every 24 hours  loperamide 2 milliGRAM(s) Oral every 6 hours  ondansetron    Tablet 4 milliGRAM(s) Oral once  sodium bicarbonate 650 milliGRAM(s) Oral three times a day  sucralfate 1 Gram(s) Oral four times a day    REVIEW OF SYSTEMS  --------------------------------------------------------------------------------  Gen: No fatigue  Respiratory: No dyspnea  CV: No chest pain  GI: No abdominal pain  MSK: No LE edema  Neuro: No dizziness  Heme: No bleeding    All other systems were reviewed and are negative, except as noted.    VITALS/PHYSICAL EXAM  --------------------------------------------------------------------------------  T(C): 36.5 (02-18-19 @ 06:30), Max: 38.3 (02-17-19 @ 22:15)  HR: 90 (02-18-19 @ 06:30) (90 - 99)  BP: 154/92 (02-18-19 @ 06:30) (154/83 - 154/92)  RR: 18 (02-18-19 @ 06:30) (18 - 18)  SpO2: 100% (02-18-19 @ 06:30) (100% - 100%)  Wt(kg): --    Physical Exam:  	Gen: NAD, well-appearing  	HEENT: No supplemental O2  	Pulm: CTA B/L  	CV: normal S1S2; no rub  	Abd: soft  	: No miller  	LE: No edema  	Skin: Warm, without rashes  LABS/STUDIES  --------------------------------------------------------------------------------              8.0    5.82  >-----------<  271      [02-18-19 @ 06:30]              24.7     138  |  99  |  46  ----------------------------<  151      [02-18-19 @ 06:30]  3.6   |  24  |  1.67        Ca     9.3     [02-18-19 @ 06:30]      Mg     1.5     [02-18-19 @ 06:30]      Phos  3.9     [02-18-19 @ 06:30]    Creatinine Trend:  SCr 1.67 [02-18 @ 06:30]  SCr 1.98 [02-17 @ 06:30]  SCr 1.99 [02-16 @ 08:15]  SCr 2.36 [02-15 @ 05:44]  SCr 2.83 [02-14 @ 05:45]    Urinalysis - [02-16-19 @ 22:30]      Color LIGHT YELLOW / Appearance CLEAR / SG 1.012 / pH 6.0      Gluc NEGATIVE / Ketone NEGATIVE  / Bili NEGATIVE / Urobili NORMAL       Blood NEGATIVE / Protein 20 / Leuk Est NEGATIVE / Nitrite NEGATIVE      RBC 0-2 / WBC 0-2 / Hyaline 1+ / Gran  / Sq Epi FEW / Non Sq Epi  / Bacteria NEGATIVE    HBsAg NEGATIVE      [02-12-19 @ 06:15]  HCV 0.04, Nonreactive Hepatitis C AB  S/CO Ratio                        Interpretation  < 1.0                                     Non-Reactive  1.0 - 4.9                           Weakly-Reactive  > 5.0                                 Reactive  Non-Reactive: Aperson with a non-reactive HCV antibody  result is considered uninfected.  No further action is  needed unless recent infection is suspected.  In these  cases, consider repeat testing later to detect  seroconversion..  Weakly-Reactive: HCV antibody test is abnormal, HCV RNA  Qualitative test will follow.  Reactive: HCV antibody test is abnormal, HCV RNA  Qualitative test will follow.  Note: HCV antibody testing is performed on the [employer] system.      [02-12-19 @ 06:15]    EMELI: titer Negative, pattern --      [02-12-19 @ 06:15]  dsDNA <12      [02-12-19 @ 06:15]  C3 Complement 145.5      [02-12-19 @ 06:15]  C4 Complement 27.2      [02-12-19 @ 06:15]  ANCA: cANCA Negative, pANCA Negative, atypical ANCA --      [02-12-19 @ 06:15]  anti-GBM <1.0      [02-12-19 @ 06:15]  Syphilis Screen (Treponema Pallidum Ab) Negative      [02-14-19 @ 05:45]

## 2019-02-18 NOTE — PROGRESS NOTE ADULT - PROBLEM SELECTOR PLAN 1
Pt with baseline SCr 1.2-1.6 admitted with elevated SCr to 2.35 (2/5/19). Scr however improved to 1.6 on 2/7/19. Pt had a CT with IV contrast on 2/7/19. Scr elevated to 3.7 (2/10/19). Pt received two doses of Filgrastim during her stay. She also had one dose of ibruprofen. Pt with possible hemodynamic ISABELLA in the setting of IV contrast, PNA and NSAIDs. Scr elevated to 4.1 (2/11/19). Scr improved/stable to 1.67 today. Pt non-oliguric. UA with  blood and protein on 2/6.  Pt with 0.9g proteinuria on spot urine exam. Hepatitis C antibody negative. C3 and C4 WNL. EMELI, RPR, ANCA, anti-GBM, dsDNA, HBSAg are negative. SIFE reveal no monoclonal band. Pt. likely with ATN. Monitor BMP. Strict I/Os. Avoid nephrotoxins. Renally dose all medications

## 2019-02-19 LAB
ANION GAP SERPL CALC-SCNC: 14 MMO/L — SIGNIFICANT CHANGE UP (ref 7–14)
BUN SERPL-MCNC: 37 MG/DL — HIGH (ref 7–23)
CALCIUM SERPL-MCNC: 9.4 MG/DL — SIGNIFICANT CHANGE UP (ref 8.4–10.5)
CHLORIDE SERPL-SCNC: 97 MMOL/L — LOW (ref 98–107)
CO2 SERPL-SCNC: 27 MMOL/L — SIGNIFICANT CHANGE UP (ref 22–31)
CREAT SERPL-MCNC: 1.49 MG/DL — HIGH (ref 0.5–1.3)
CRYOGLOB SERPL-MCNC: 0 — SIGNIFICANT CHANGE UP
FAT STL QN: NORMAL — SIGNIFICANT CHANGE UP
FAT STL QN: NORMAL — SIGNIFICANT CHANGE UP
GLUCOSE BLDC GLUCOMTR-MCNC: 124 MG/DL — HIGH (ref 70–99)
GLUCOSE BLDC GLUCOMTR-MCNC: 135 MG/DL — HIGH (ref 70–99)
GLUCOSE BLDC GLUCOMTR-MCNC: 160 MG/DL — HIGH (ref 70–99)
GLUCOSE BLDC GLUCOMTR-MCNC: 215 MG/DL — HIGH (ref 70–99)
GLUCOSE SERPL-MCNC: 141 MG/DL — HIGH (ref 70–99)
HCT VFR BLD CALC: 24.9 % — LOW (ref 34.5–45)
HGB BLD-MCNC: 8 G/DL — LOW (ref 11.5–15.5)
MAGNESIUM SERPL-MCNC: 1.7 MG/DL — SIGNIFICANT CHANGE UP (ref 1.6–2.6)
MCHC RBC-ENTMCNC: 28.5 PG — SIGNIFICANT CHANGE UP (ref 27–34)
MCHC RBC-ENTMCNC: 32.1 % — SIGNIFICANT CHANGE UP (ref 32–36)
MCV RBC AUTO: 88.6 FL — SIGNIFICANT CHANGE UP (ref 80–100)
NRBC # FLD: 0 K/UL — LOW (ref 25–125)
PHOSPHATE SERPL-MCNC: 3.6 MG/DL — SIGNIFICANT CHANGE UP (ref 2.5–4.5)
PLATELET # BLD AUTO: 275 K/UL — SIGNIFICANT CHANGE UP (ref 150–400)
PMV BLD: 10 FL — SIGNIFICANT CHANGE UP (ref 7–13)
POTASSIUM SERPL-MCNC: 3.6 MMOL/L — SIGNIFICANT CHANGE UP (ref 3.5–5.3)
POTASSIUM SERPL-SCNC: 3.6 MMOL/L — SIGNIFICANT CHANGE UP (ref 3.5–5.3)
RBC # BLD: 2.81 M/UL — LOW (ref 3.8–5.2)
RBC # FLD: 18.4 % — HIGH (ref 10.3–14.5)
SODIUM SERPL-SCNC: 138 MMOL/L — SIGNIFICANT CHANGE UP (ref 135–145)
WBC # BLD: 4.84 K/UL — SIGNIFICANT CHANGE UP (ref 3.8–10.5)
WBC # FLD AUTO: 4.84 K/UL — SIGNIFICANT CHANGE UP (ref 3.8–10.5)

## 2019-02-19 PROCEDURE — 99233 SBSQ HOSP IP/OBS HIGH 50: CPT

## 2019-02-19 PROCEDURE — 99233 SBSQ HOSP IP/OBS HIGH 50: CPT | Mod: GC

## 2019-02-19 RX ORDER — AMLODIPINE BESYLATE 2.5 MG/1
10 TABLET ORAL DAILY
Qty: 0 | Refills: 0 | Status: DISCONTINUED | OUTPATIENT
Start: 2019-02-19 | End: 2019-02-20

## 2019-02-19 RX ORDER — COLCHICINE 0.6 MG
0.6 TABLET ORAL DAILY
Qty: 0 | Refills: 0 | Status: DISCONTINUED | OUTPATIENT
Start: 2019-02-19 | End: 2019-02-20

## 2019-02-19 RX ORDER — SODIUM CHLORIDE 9 MG/ML
1000 INJECTION, SOLUTION INTRAVENOUS
Qty: 0 | Refills: 0 | Status: DISCONTINUED | OUTPATIENT
Start: 2019-02-19 | End: 2019-02-20

## 2019-02-19 RX ORDER — SUCRALFATE 1 G
1 TABLET ORAL EVERY 6 HOURS
Qty: 0 | Refills: 0 | Status: DISCONTINUED | OUTPATIENT
Start: 2019-02-19 | End: 2019-02-20

## 2019-02-19 RX ADMIN — Medication 1 GRAM(S): at 23:07

## 2019-02-19 RX ADMIN — Medication 650 MILLIGRAM(S): at 09:17

## 2019-02-19 RX ADMIN — Medication 1 GRAM(S): at 12:54

## 2019-02-19 RX ADMIN — Medication 1 GRAM(S): at 17:39

## 2019-02-19 RX ADMIN — Medication 650 MILLIGRAM(S): at 10:15

## 2019-02-19 RX ADMIN — SODIUM CHLORIDE 50 MILLILITER(S): 9 INJECTION, SOLUTION INTRAVENOUS at 23:09

## 2019-02-19 RX ADMIN — Medication 650 MILLIGRAM(S): at 06:16

## 2019-02-19 RX ADMIN — Medication 2 MILLIGRAM(S): at 17:40

## 2019-02-19 RX ADMIN — Medication 650 MILLIGRAM(S): at 12:56

## 2019-02-19 RX ADMIN — Medication 1 GRAM(S): at 06:16

## 2019-02-19 RX ADMIN — Medication 2 MILLIGRAM(S): at 23:07

## 2019-02-19 RX ADMIN — AMLODIPINE BESYLATE 10 MILLIGRAM(S): 2.5 TABLET ORAL at 18:46

## 2019-02-19 RX ADMIN — Medication 2 MILLIGRAM(S): at 12:55

## 2019-02-19 RX ADMIN — Medication 2 MILLIGRAM(S): at 06:16

## 2019-02-19 RX ADMIN — CHLORHEXIDINE GLUCONATE 1 APPLICATION(S): 213 SOLUTION TOPICAL at 09:17

## 2019-02-19 NOTE — PROGRESS NOTE ADULT - SUBJECTIVE AND OBJECTIVE BOX
Patient is a 74y old  Female who presents with a chief complaint of Syncopal Episode (18 Feb 2019 14:23)      SUBJECTIVE / OVERNIGHT EVENTS:    MEDICATIONS  (STANDING):  chlorhexidine 4% Liquid 1 Application(s) Topical <User Schedule>  dextrose 5%. 1000 milliLiter(s) (50 mL/Hr) IV Continuous <Continuous>  dextrose 50% Injectable 12.5 Gram(s) IV Push once  dextrose 50% Injectable 25 Gram(s) IV Push once  dextrose 50% Injectable 25 Gram(s) IV Push once  epoetin diogo Injectable 90117 Unit(s) SubCutaneous <User Schedule>  insulin lispro (HumaLOG) corrective regimen sliding scale   SubCutaneous at bedtime  insulin lispro (HumaLOG) corrective regimen sliding scale   SubCutaneous three times a day before meals  lactated ringers. 500 milliLiter(s) (40 mL/Hr) IV Continuous <Continuous>  levoFLOXacin  Tablet 250 milliGRAM(s) Oral every 24 hours  loperamide 2 milliGRAM(s) Oral every 6 hours  ondansetron    Tablet 4 milliGRAM(s) Oral once  sodium bicarbonate 650 milliGRAM(s) Oral three times a day  sucralfate 1 Gram(s) Oral four times a day    MEDICATIONS  (PRN):  acetaminophen   Tablet .. 650 milliGRAM(s) Oral every 6 hours PRN Mild Pain (1 - 3)  acetaminophen   Tablet .. 650 milliGRAM(s) Oral every 6 hours PRN Temp greater or equal to 38C (100.4F)  dextrose 40% Gel 15 Gram(s) Oral once PRN Blood Glucose LESS THAN 70 milliGRAM(s)/deciliter  glucagon  Injectable 1 milliGRAM(s) IntraMuscular once PRN Glucose LESS THAN 70 milligrams/deciliter        POCT Blood Glucose.: 135 mg/dL (19 Feb 2019 09:03)  POCT Blood Glucose.: 189 mg/dL (18 Feb 2019 22:12)  POCT Blood Glucose.: 150 mg/dL (18 Feb 2019 17:44)  POCT Blood Glucose.: 199 mg/dL (18 Feb 2019 12:47)    I&O's Summary      PHYSICAL EXAM:  Vital Signs Last 24 Hrs  T(C): 37.1 (19 Feb 2019 06:00), Max: 37.1 (19 Feb 2019 06:00)  T(F): 98.8 (19 Feb 2019 06:00), Max: 98.8 (19 Feb 2019 06:00)  HR: 88 (19 Feb 2019 06:00) (80 - 90)  BP: 151/87 (19 Feb 2019 06:00) (151/87 - 190/89)  BP(mean): --  RR: 18 (19 Feb 2019 06:00) (18 - 18)  SpO2: 99% (19 Feb 2019 06:00) (99% - 100%)  GENERAL: NAD, well-developed  HEAD:  Atraumatic, Normocephalic  EYES: EOMI, PERRLA, conjunctiva and sclera clear  NECK: Supple, No JVD  CHEST/LUNG: Clear to auscultation bilaterally; No wheeze  PORT  HEART: Regular rate and rhythm; No murmurs, rubs, or gallops  ABDOMEN: Soft, Nontender, Nondistended; Bowel sounds present  EXTREMITIES:  2+ Peripheral Pulses, No clubbing, cyanosis, or edema  PSYCH: AAOx3  NEUROLOGY: non-focal  SKIN: No rashes or lesions    LABS:                        8.0    4.84  )-----------( 275      ( 19 Feb 2019 06:15 )             24.9     02-19    138  |  97<L>  |  37<H>  ----------------------------<  141<H>  3.6   |  27  |  1.49<H>    Ca    9.4      19 Feb 2019 06:15  Phos  3.6     02-19  Mg     1.7     02-19      RADIOLOGY & ADDITIONAL TESTS:    Imaging Personally Reviewed:    Consultant(s) Notes Reviewed:      Care Discussed with Consultants/Other Providers: Patient is a 74y old  Female who presents with a chief complaint of Syncopal Episode (18 Feb 2019 14:23)      SUBJECTIVE / OVERNIGHT EVENTS:  Patient notes she wants to go home.  Wants trial of removing Flexiseal.      MEDICATIONS  (STANDING):  chlorhexidine 4% Liquid 1 Application(s) Topical <User Schedule>  dextrose 5%. 1000 milliLiter(s) (50 mL/Hr) IV Continuous <Continuous>  dextrose 50% Injectable 12.5 Gram(s) IV Push once  dextrose 50% Injectable 25 Gram(s) IV Push once  dextrose 50% Injectable 25 Gram(s) IV Push once  epoetin diogo Injectable 40444 Unit(s) SubCutaneous <User Schedule>  insulin lispro (HumaLOG) corrective regimen sliding scale   SubCutaneous at bedtime  insulin lispro (HumaLOG) corrective regimen sliding scale   SubCutaneous three times a day before meals  lactated ringers. 500 milliLiter(s) (40 mL/Hr) IV Continuous <Continuous>  levoFLOXacin  Tablet 250 milliGRAM(s) Oral every 24 hours  loperamide 2 milliGRAM(s) Oral every 6 hours  ondansetron    Tablet 4 milliGRAM(s) Oral once  sodium bicarbonate 650 milliGRAM(s) Oral three times a day  sucralfate 1 Gram(s) Oral four times a day    MEDICATIONS  (PRN):  acetaminophen   Tablet .. 650 milliGRAM(s) Oral every 6 hours PRN Mild Pain (1 - 3)  acetaminophen   Tablet .. 650 milliGRAM(s) Oral every 6 hours PRN Temp greater or equal to 38C (100.4F)  dextrose 40% Gel 15 Gram(s) Oral once PRN Blood Glucose LESS THAN 70 milliGRAM(s)/deciliter  glucagon  Injectable 1 milliGRAM(s) IntraMuscular once PRN Glucose LESS THAN 70 milligrams/deciliter        POCT Blood Glucose.: 135 mg/dL (19 Feb 2019 09:03)  POCT Blood Glucose.: 189 mg/dL (18 Feb 2019 22:12)  POCT Blood Glucose.: 150 mg/dL (18 Feb 2019 17:44)  POCT Blood Glucose.: 199 mg/dL (18 Feb 2019 12:47)    I&O's Summary      PHYSICAL EXAM:  Vital Signs Last 24 Hrs  T(C): 37.1 (19 Feb 2019 06:00), Max: 37.1 (19 Feb 2019 06:00)  T(F): 98.8 (19 Feb 2019 06:00), Max: 98.8 (19 Feb 2019 06:00)  HR: 88 (19 Feb 2019 06:00) (80 - 90)  BP: 151/87 (19 Feb 2019 06:00) (151/87 - 190/89)  BP(mean): --  RR: 18 (19 Feb 2019 06:00) (18 - 18)  SpO2: 99% (19 Feb 2019 06:00) (99% - 100%)  GENERAL: NAD, well-developed  HEAD:  Atraumatic, Normocephalic  EYES: EOMI, PERRLA, conjunctiva and sclera clear  NECK: Supple, No JVD  CHEST/LUNG: Clear to auscultation bilaterally; No wheeze  PORT  HEART: Regular rate and rhythm; No murmurs, rubs, or gallops  ABDOMEN: Soft, Nontender, Nondistended; Bowel sounds present  EXTREMITIES:  2+ Peripheral Pulses, No clubbing, cyanosis, or edema  PSYCH: AAOx3  NEUROLOGY: non-focal  SKIN: No rashes or lesions  Flexiseal in place- semi- solid stool    LABS:                        8.0    4.84  )-----------( 275      ( 19 Feb 2019 06:15 )             24.9     02-19    138  |  97<L>  |  37<H>  ----------------------------<  141<H>  3.6   |  27  |  1.49<H>    Ca    9.4      19 Feb 2019 06:15  Phos  3.6     02-19  Mg     1.7     02-19      RADIOLOGY & ADDITIONAL TESTS:    Imaging Personally Reviewed:    Consultant(s) Notes Reviewed:      Care Discussed with Consultants/Other Providers:  Onc/ Gi

## 2019-02-19 NOTE — PROGRESS NOTE ADULT - SUBJECTIVE AND OBJECTIVE BOX
[facility] DIVISION OF KIDNEY DISEASES AND HYPERTENSION -- FOLLOW UP NOTE  --------------------------------------------------------------------------------    Chief complaint: ISABELLA    24 hour events/subjective: Patient seen and examined at bedside this AM. Patient states that she feels well and denies SOB.    PAST HISTORY  --------------------------------------------------------------------------------  No significant changes to PMH, PSH, FHx, SHx, unless otherwise noted    ALLERGIES & MEDICATIONS  --------------------------------------------------------------------------------  Allergies    No Known Allergies    Intolerances    codeine (Nausea)    Standing Inpatient Medications  chlorhexidine 4% Liquid 1 Application(s) Topical <User Schedule>  dextrose 5%. 1000 milliLiter(s) IV Continuous <Continuous>  dextrose 50% Injectable 12.5 Gram(s) IV Push once  dextrose 50% Injectable 25 Gram(s) IV Push once  dextrose 50% Injectable 25 Gram(s) IV Push once  epoetin diogo Injectable 67242 Unit(s) SubCutaneous <User Schedule>  insulin lispro (HumaLOG) corrective regimen sliding scale   SubCutaneous at bedtime  insulin lispro (HumaLOG) corrective regimen sliding scale   SubCutaneous three times a day before meals  lactated ringers. 500 milliLiter(s) IV Continuous <Continuous>  levoFLOXacin  Tablet 250 milliGRAM(s) Oral every 24 hours  loperamide 2 milliGRAM(s) Oral every 6 hours  ondansetron    Tablet 4 milliGRAM(s) Oral once  sodium bicarbonate 650 milliGRAM(s) Oral three times a day  sucralfate 1 Gram(s) Oral every 6 hours    PRN Inpatient Medications  acetaminophen   Tablet .. 650 milliGRAM(s) Oral every 6 hours PRN  acetaminophen   Tablet .. 650 milliGRAM(s) Oral every 6 hours PRN  dextrose 40% Gel 15 Gram(s) Oral once PRN  glucagon  Injectable 1 milliGRAM(s) IntraMuscular once PRN      REVIEW OF SYSTEMS  --------------------------------------------------------------------------------  Gen: No fatigue  Respiratory: No dyspnea  CV: No chest pain  GI: No abdominal pain  MSK: No LE edema  Neuro: No dizziness  Heme: No bleeding    All other systems were reviewed and are negative, except as noted.    VITALS/PHYSICAL EXAM  --------------------------------------------------------------------------------  T(C): 37.1 (02-19-19 @ 06:00), Max: 37.1 (02-19-19 @ 06:00)  HR: 88 (02-19-19 @ 06:00) (80 - 90)  BP: 151/87 (02-19-19 @ 06:00) (151/87 - 190/89)  RR: 18 (02-19-19 @ 06:00) (18 - 18)  SpO2: 99% (02-19-19 @ 06:00) (99% - 100%)  Wt(kg): --    Physical Exam:  	Gen: NAD, well-appearing  	HEENT: No supplemental O2  	Pulm: CTA B/L  	CV: normal S1S2; no rub  	Abd: soft  	: No miller  	LE: No edema  	Skin: Warm, without rashes    LABS/STUDIES  --------------------------------------------------------------------------------              8.0    4.84  >-----------<  275      [02-19-19 @ 06:15]              24.9     138  |  97  |  37  ----------------------------<  141      [02-19-19 @ 06:15]  3.6   |  27  |  1.49        Ca     9.4     [02-19-19 @ 06:15]      Mg     1.7     [02-19-19 @ 06:15]      Phos  3.6     [02-19-19 @ 06:15]    Creatinine Trend:  SCr 1.49 [02-19 @ 06:15]  SCr 1.67 [02-18 @ 06:30]  SCr 1.98 [02-17 @ 06:30]  SCr 1.99 [02-16 @ 08:15]  SCr 2.36 [02-15 @ 05:44]    Urinalysis - [02-16-19 @ 22:30]      Color LIGHT YELLOW / Appearance CLEAR / SG 1.012 / pH 6.0      Gluc NEGATIVE / Ketone NEGATIVE  / Bili NEGATIVE / Urobili NORMAL       Blood NEGATIVE / Protein 20 / Leuk Est NEGATIVE / Nitrite NEGATIVE      RBC 0-2 / WBC 0-2 / Hyaline 1+ / Gran  / Sq Epi FEW / Non Sq Epi  / Bacteria NEGATIVE    Iron 11, TIBC 133, %sat --      [02-06-19 @ 06:01]  Ferritin 1928      [02-06-19 @ 06:01]  HbA1c 7.6      [02-05-19 @ 21:25]    HBsAg NEGATIVE      [02-12-19 @ 06:15]  HCV 0.04, Nonreactive Hepatitis C AB  S/CO Ratio                        Interpretation  < 1.0                                     Non-Reactive  1.0 - 4.9                           Weakly-Reactive  > 5.0                                 Reactive  Non-Reactive: Aperson with a non-reactive HCV antibody  result is considered uninfected.  No further action is  needed unless recent infection is suspected.  In these  cases, consider repeat testing later to detect  seroconversion..  Weakly-Reactive: HCV antibody test is abnormal, HCV RNA  Qualitative test will follow.  Reactive: HCV antibody test is abnormal, HCV RNA  Qualitative test will follow.  Note: HCV antibody testing is performed on the [employer]   system.      [02-12-19 @ 06:15]    EMELI: titer Negative, pattern --      [02-12-19 @ 06:15]  dsDNA <12      [02-12-19 @ 06:15]  C3 Complement 145.5      [02-12-19 @ 06:15]  C4 Complement 27.2      [02-12-19 @ 06:15]  ANCA: cANCA Negative, pANCA Negative, atypical ANCA --      [02-12-19 @ 06:15]  anti-GBM <1.0      [02-12-19 @ 06:15]  Syphilis Screen (Treponema Pallidum Ab) Negative      [02-14-19 @ 05:45]  Cryoglobulin: 0      [02-12-19 @ 06:15]

## 2019-02-19 NOTE — PROVIDER CONTACT NOTE (OTHER) - ASSESSMENT
Pt  asymptomatic denies any headache blurred vision. BP was taken multiple times electronically in BL LE extremities since bp cannot be taken on upper extremities (patient has precautions in both arms r/t past lumpectomy)

## 2019-02-19 NOTE — PROGRESS NOTE ADULT - PROBLEM SELECTOR PLAN 2
-RLL opacity on CXR   Maryam PNA- c/w Iv Azythro- change to levaquin until 2/23 -RLL opacity on CXR   Maryam PNA- c/w Iv Azythro- change to Levaquin until 2/23

## 2019-02-19 NOTE — PROGRESS NOTE ADULT - SUBJECTIVE AND OBJECTIVE BOX
feels stronger  diarrhea improves    REVIEW OF SYSTEMS:    CONSTITUTIONAL: No weakness, fevers or chills, weight loss  EYES/ENT: No visual changes, no throat pain   RESPIRATORY: No cough, wheezing, hemoptysis; No shortness of breath  CARDIOVASCULAR: No chest pain or palpitations  GASTROINTESTINAL: No abdominal, nausea, vomiting, or hematemesis; No diarrhea or constipation. No melena or hematochezia.  GENITOURINARY: No dysuria, frequency or hematuria  NEUROLOGICAL: No dizziness, numbness, or weakness  SKIN: No itching, burning, rashes, or lesions   All other review of systems is negative unless indicated above.    VITAL SIGNS:    98.8, P88 /87 RR18    PHYSICAL EXAM:     GENERAL: no acute distress  HEENT: NC/AT, EOMI, neck supple, MMM  RESPIRATORY: LCTAB/L, no rhonchi, rales, or wheezing  CARDIOVASCULAR: RRR, no murmurs, gallops, rubs  ABDOMINAL: soft, non-tender, non-distended, positive bowel sounds   EXTREMITIES: no clubbing, cyanosis, or edema  NEUROLOGICAL: alert and oriented x 3, non-focal  LYMPHATIC: lymphatic: cervical, supraclavicular, axilla, inguinal                        8.0    4.84  )-----------( 275      ( 19 Feb 2019 06:15 )             24.9     02-19    138  |  97<L>  |  37<H>  ----------------------------<  141<H>  3.6   |  27  |  1.49<H>    Ca    9.4      19 Feb 2019 06:15  Phos  3.6     02-19  Mg     1.7     02-19        MEDICATIONS  (STANDING):  chlorhexidine 4% Liquid 1 Application(s) Topical <User Schedule>  dextrose 5%. 1000 milliLiter(s) (50 mL/Hr) IV Continuous <Continuous>  dextrose 50% Injectable 12.5 Gram(s) IV Push once  dextrose 50% Injectable 25 Gram(s) IV Push once  dextrose 50% Injectable 25 Gram(s) IV Push once  epoetin diogo Injectable 06881 Unit(s) SubCutaneous <User Schedule>  insulin lispro (HumaLOG) corrective regimen sliding scale   SubCutaneous at bedtime  insulin lispro (HumaLOG) corrective regimen sliding scale   SubCutaneous three times a day before meals  lactated ringers. 500 milliLiter(s) (40 mL/Hr) IV Continuous <Continuous>  levoFLOXacin  Tablet 250 milliGRAM(s) Oral every 24 hours  loperamide 2 milliGRAM(s) Oral every 6 hours  ondansetron    Tablet 4 milliGRAM(s) Oral once  sodium bicarbonate 650 milliGRAM(s) Oral three times a day  sucralfate 1 Gram(s) Oral every 6 hours

## 2019-02-19 NOTE — PROGRESS NOTE ADULT - PROBLEM SELECTOR PLAN 7
-ISABELLA on CKD likely ATN in the setting of sepsis and now contrast exposure   -Cr at admission 2.35, with a baseline of ~1.3, now down to 1.49  -met acidosis resolved, off NaHCO3 drip, c/w LR, nephrology f/u appreciated  -mild proteinuria 0.9 g on spot urine, serologies negative, complement normal, f/u nephrology recs

## 2019-02-19 NOTE — PROGRESS NOTE ADULT - PROBLEM SELECTOR PLAN 1
Pt with baseline SCr 1.2-1.6 admitted with elevated SCr to 2.35 (2/5/19). Scr however improved to 1.6 on 2/7/19. Pt had a CT with IV contrast on 2/7/19. Scr elevated to 3.7 (2/10/19). Pt received two doses of Filgrastim during her stay. She also had one dose of ibruprofen. Pt with possible hemodynamic ISABELLA in the setting of IV contrast, PNA and NSAIDs. Scr elevated to 4.1 (2/11/19). Scr improved/stable to 1.49 today. Pt non-oliguric.   UA with  blood and protein on 2/6. Pt with 0.9g proteinuria on spot urine exam. Hepatitis C antibody negative. C3 and C4 WNL. EMELI, RPR, ANCA, anti-GBM, dsDNA, HBSAg are negative. SIFE reveal no monoclonal band. Pt. likely with ATN. Monitor BMP. Strict I/Os. Avoid nephrotoxins. Renally dose all medications

## 2019-02-19 NOTE — PROGRESS NOTE ADULT - PROBLEM SELECTOR PLAN 10
Orthostatic BP most likely sec to diarrhea  IVF hydration.  Monitor BP improved with hydration and resolution of sepsis ansd with forming stool with less diarrhea.  Start norvasc 10 mg qd

## 2019-02-19 NOTE — PROGRESS NOTE ADULT - PROBLEM SELECTOR PLAN 4
-Stool studies negative, neg Cdiff .  -diarrhea improved on imodium, carafate  -supportive care per GI, loperamide q6h  -monitor stool -Stool studies negative, neg Cdiff .  -diarrhea improved on imodium, carafate q 6  -supportive care per GI, loperamide q6h  -monitor stool

## 2019-02-20 VITALS
DIASTOLIC BLOOD PRESSURE: 78 MMHG | OXYGEN SATURATION: 100 % | HEART RATE: 110 BPM | TEMPERATURE: 100 F | RESPIRATION RATE: 18 BRPM | SYSTOLIC BLOOD PRESSURE: 156 MMHG

## 2019-02-20 LAB
ANION GAP SERPL CALC-SCNC: 13 MMO/L — SIGNIFICANT CHANGE UP (ref 7–14)
BUN SERPL-MCNC: 32 MG/DL — HIGH (ref 7–23)
CALCIUM SERPL-MCNC: 9.4 MG/DL — SIGNIFICANT CHANGE UP (ref 8.4–10.5)
CHLORIDE SERPL-SCNC: 95 MMOL/L — LOW (ref 98–107)
CO2 SERPL-SCNC: 27 MMOL/L — SIGNIFICANT CHANGE UP (ref 22–31)
CREAT SERPL-MCNC: 1.49 MG/DL — HIGH (ref 0.5–1.3)
GLUCOSE BLDC GLUCOMTR-MCNC: 164 MG/DL — HIGH (ref 70–99)
GLUCOSE SERPL-MCNC: 160 MG/DL — HIGH (ref 70–99)
POTASSIUM SERPL-MCNC: 3.8 MMOL/L — SIGNIFICANT CHANGE UP (ref 3.5–5.3)
POTASSIUM SERPL-SCNC: 3.8 MMOL/L — SIGNIFICANT CHANGE UP (ref 3.5–5.3)
SODIUM SERPL-SCNC: 135 MMOL/L — SIGNIFICANT CHANGE UP (ref 135–145)

## 2019-02-20 PROCEDURE — 99239 HOSP IP/OBS DSCHRG MGMT >30: CPT

## 2019-02-20 RX ORDER — COLCHICINE 0.6 MG
1 TABLET ORAL
Qty: 0 | Refills: 0 | COMMUNITY

## 2019-02-20 RX ORDER — GLIMEPIRIDE 1 MG
1 TABLET ORAL
Qty: 0 | Refills: 0 | COMMUNITY

## 2019-02-20 RX ORDER — CARVEDILOL PHOSPHATE 80 MG/1
1 CAPSULE, EXTENDED RELEASE ORAL
Qty: 0 | Refills: 0 | COMMUNITY

## 2019-02-20 RX ORDER — SUCRALFATE 1 G
1 TABLET ORAL
Qty: 56 | Refills: 0 | OUTPATIENT
Start: 2019-02-20 | End: 2019-03-05

## 2019-02-20 RX ORDER — LOPERAMIDE HCL 2 MG
1 TABLET ORAL
Qty: 0 | Refills: 0 | COMMUNITY
Start: 2019-02-20

## 2019-02-20 RX ADMIN — Medication 2 MILLIGRAM(S): at 05:19

## 2019-02-20 RX ADMIN — Medication 650 MILLIGRAM(S): at 05:37

## 2019-02-20 RX ADMIN — CHLORHEXIDINE GLUCONATE 1 APPLICATION(S): 213 SOLUTION TOPICAL at 10:23

## 2019-02-20 RX ADMIN — Medication 650 MILLIGRAM(S): at 06:37

## 2019-02-20 RX ADMIN — Medication 1 GRAM(S): at 05:19

## 2019-02-20 RX ADMIN — AMLODIPINE BESYLATE 10 MILLIGRAM(S): 2.5 TABLET ORAL at 05:19

## 2019-02-20 RX ADMIN — Medication 0.6 MILLIGRAM(S): at 10:22

## 2019-02-20 NOTE — PROGRESS NOTE ADULT - PROBLEM SELECTOR PLAN 10
BP improved with hydration and resolution of sepsis ansd with forming stool with less diarrhea.  Start norvasc 10 mg qd

## 2019-02-20 NOTE — PROGRESS NOTE ADULT - PROBLEM SELECTOR PLAN 4
-Stool studies negative, neg Cdiff .  -diarrhea improved on imodium, carafate q 6  -supportive care per GI, loperamide q6h  -monitor stool

## 2019-02-20 NOTE — PROGRESS NOTE ADULT - ASSESSMENT
73yo F with PMH of breast CA on chemo (last treatment 8 days ago), CKD, HTN, DMT2 presenting s/p syncopal episode, found to be in neutropenic sepsis (T 39.4-39.7 C, -124, RR 20, lactate 2.9, WBC 0.35, 0.01 K/ul) in setting of recent chemotherapy, likely 2/2 PNA 2/2 legionella, c/b ISABELLA on CKD, having recurrent fevers respiratory distress on BiPAP.  Transferred from MICU to floors 2/12  Legionella PNA  multi- focal PNA  and Neutropenia.- now resolved  Diiarrhea- ?? Osmotic  Orthostatic BP- Resolved.  Ready for HOME with home PT

## 2019-02-20 NOTE — PROGRESS NOTE ADULT - PROBLEM SELECTOR PLAN 1
-Met sepsis criteria in the ED (T 39.4-39.7 C, -124, RR 20, lactate 2.9), with Resolved Sepsis  Complete iv Azythro 2/23- ID rec change to Levaquin since Azythro can cause diarrhea- Levaquin untill 2/23

## 2019-02-20 NOTE — PROGRESS NOTE ADULT - REASON FOR ADMISSION
Syncopal Episode

## 2019-02-20 NOTE — PROGRESS NOTE ADULT - PROBLEM SELECTOR PROBLEM 2
HCAP (healthcare-associated pneumonia)
HCAP (healthcare-associated pneumonia)
ISABELLA (acute kidney injury)
Acute respiratory failure with hypoxia
HCAP (healthcare-associated pneumonia)
HCAP (healthcare-associated pneumonia)
ISABELLA (acute kidney injury)
ISABELLA (acute kidney injury)
HCAP (healthcare-associated pneumonia)
Metabolic acidosis
HCAP (healthcare-associated pneumonia)

## 2019-02-20 NOTE — PROGRESS NOTE ADULT - ATTENDING COMMENTS
Ready for home.  Restart back Home Diabetic meds. HGb Aic on ad 7.6  Restart home BP meds.  Levaquin until 2/23.  Ready for Home.   Out patient f/u with Dr Ruiz and Dr Villalobos- PCP  45 min to coordinate

## 2019-02-20 NOTE — PROGRESS NOTE ADULT - PROBLEM SELECTOR PROBLEM 1
Diarrhea
Diarrhea, unspecified type
ISABELLA (acute kidney injury)
ISABELLA (acute kidney injury)
Sepsis
Sepsis
ISABELLA (acute kidney injury)
ISBAELLA (acute kidney injury)
Sepsis

## 2019-02-20 NOTE — PROGRESS NOTE ADULT - PROVIDER SPECIALTY LIST ADULT
Heme/Onc
Hospitalist
Infectious Disease
Internal Medicine
Internal Medicine
MICU
Nephrology
Infectious Disease
Nephrology
Nephrology
Heme/Onc
Hospitalist
Internal Medicine
Internal Medicine
Hospitalist

## 2019-02-20 NOTE — PROGRESS NOTE ADULT - SUBJECTIVE AND OBJECTIVE BOX
Patient is a 74y old  Female who presents with a chief complaint of Syncopal Episode (19 Feb 2019 14:12)      SUBJECTIVE / OVERNIGHT EVENTS:  Patient ready for home    MEDICATIONS  (STANDING):  amLODIPine   Tablet 10 milliGRAM(s) Oral daily  chlorhexidine 4% Liquid 1 Application(s) Topical <User Schedule>  colchicine 0.6 milliGRAM(s) Oral daily  dextrose 5%. 1000 milliLiter(s) (50 mL/Hr) IV Continuous <Continuous>  dextrose 50% Injectable 12.5 Gram(s) IV Push once  dextrose 50% Injectable 25 Gram(s) IV Push once  dextrose 50% Injectable 25 Gram(s) IV Push once  epoetin diogo Injectable 20102 Unit(s) SubCutaneous <User Schedule>  insulin lispro (HumaLOG) corrective regimen sliding scale   SubCutaneous at bedtime  insulin lispro (HumaLOG) corrective regimen sliding scale   SubCutaneous three times a day before meals  levoFLOXacin  Tablet 250 milliGRAM(s) Oral every 24 hours  loperamide 2 milliGRAM(s) Oral every 6 hours  ondansetron    Tablet 4 milliGRAM(s) Oral once  sodium chloride 0.45%. 1000 milliLiter(s) (50 mL/Hr) IV Continuous <Continuous>  sucralfate 1 Gram(s) Oral every 6 hours    MEDICATIONS  (PRN):  acetaminophen   Tablet .. 650 milliGRAM(s) Oral every 6 hours PRN Mild Pain (1 - 3)  acetaminophen   Tablet .. 650 milliGRAM(s) Oral every 6 hours PRN Temp greater or equal to 38C (100.4F)  dextrose 40% Gel 15 Gram(s) Oral once PRN Blood Glucose LESS THAN 70 milliGRAM(s)/deciliter  glucagon  Injectable 1 milliGRAM(s) IntraMuscular once PRN Glucose LESS THAN 70 milligrams/deciliter        CAPILLARY BLOOD GLUCOSE      POCT Blood Glucose.: 164 mg/dL (20 Feb 2019 08:41)  POCT Blood Glucose.: 160 mg/dL (19 Feb 2019 22:08)  POCT Blood Glucose.: 215 mg/dL (19 Feb 2019 17:30)  POCT Blood Glucose.: 124 mg/dL (19 Feb 2019 12:51)    I&O's Summary      PHYSICAL EXAM:  Vital Signs Last 24 Hrs  T(C): 37.6 (20 Feb 2019 05:32), Max: 37.6 (19 Feb 2019 21:13)  T(F): 99.6 (20 Feb 2019 05:32), Max: 99.6 (19 Feb 2019 21:13)  HR: 110 (20 Feb 2019 05:32) (92 - 111)  BP: 156/78 (20 Feb 2019 05:32) (156/78 - 230/137)  BP(mean): --  RR: 18 (20 Feb 2019 05:32) (18 - 18)  SpO2: 100% (20 Feb 2019 05:32) (100% - 100%)  GENERAL: NAD, well-developed  HEAD:  Atraumatic, Normocephalic  EYES: EOMI, PERRLA, conjunctiva and sclera clear  NECK: Supple, No JVD  CHEST/LUNG: Clear to auscultation bilaterally; No wheeze  HEART: Regular rate and rhythm; No murmurs, rubs, or gallops  ABDOMEN: Soft, Nontender, Nondistended; Bowel sounds present  EXTREMITIES:  2+ Peripheral Pulses, No clubbing, cyanosis, or edema  PSYCH: AAOx3  NEUROLOGY: non-focal  SKIN: No rashes or lesions    LABS:                        8.0    4.84  )-----------( 275      ( 19 Feb 2019 06:15 )             24.9     02-20    135  |  95<L>  |  32<H>  ----------------------------<  160<H>  3.8   |  27  |  1.49<H>    Ca    9.4      20 Feb 2019 06:00  Phos  3.6     02-19  Mg     1.7     02-19                RADIOLOGY & ADDITIONAL TESTS:    Imaging Personally Reviewed:    Consultant(s) Notes Reviewed:      Care Discussed with Consultants/Other Providers: Patient is a 74y old  Female who presents with a chief complaint of Syncopal Episode (19 Feb 2019 14:12)      SUBJECTIVE / OVERNIGHT EVENTS:  Patient ready for home    MEDICATIONS  (STANDING):  amLODIPine   Tablet 10 milliGRAM(s) Oral daily  chlorhexidine 4% Liquid 1 Application(s) Topical <User Schedule>  colchicine 0.6 milliGRAM(s) Oral daily  dextrose 5%. 1000 milliLiter(s) (50 mL/Hr) IV Continuous <Continuous>  dextrose 50% Injectable 12.5 Gram(s) IV Push once  dextrose 50% Injectable 25 Gram(s) IV Push once  dextrose 50% Injectable 25 Gram(s) IV Push once  epoetin diogo Injectable 84985 Unit(s) SubCutaneous <User Schedule>  insulin lispro (HumaLOG) corrective regimen sliding scale   SubCutaneous at bedtime  insulin lispro (HumaLOG) corrective regimen sliding scale   SubCutaneous three times a day before meals  levoFLOXacin  Tablet 250 milliGRAM(s) Oral every 24 hours  loperamide 2 milliGRAM(s) Oral every 6 hours  ondansetron    Tablet 4 milliGRAM(s) Oral once  sodium chloride 0.45%. 1000 milliLiter(s) (50 mL/Hr) IV Continuous <Continuous>  sucralfate 1 Gram(s) Oral every 6 hours    MEDICATIONS  (PRN):  acetaminophen   Tablet .. 650 milliGRAM(s) Oral every 6 hours PRN Mild Pain (1 - 3)  acetaminophen   Tablet .. 650 milliGRAM(s) Oral every 6 hours PRN Temp greater or equal to 38C (100.4F)  dextrose 40% Gel 15 Gram(s) Oral once PRN Blood Glucose LESS THAN 70 milliGRAM(s)/deciliter  glucagon  Injectable 1 milliGRAM(s) IntraMuscular once PRN Glucose LESS THAN 70 milligrams/deciliter        CAPILLARY BLOOD GLUCOSE      POCT Blood Glucose.: 164 mg/dL (20 Feb 2019 08:41)  POCT Blood Glucose.: 160 mg/dL (19 Feb 2019 22:08)  POCT Blood Glucose.: 215 mg/dL (19 Feb 2019 17:30)  POCT Blood Glucose.: 124 mg/dL (19 Feb 2019 12:51)    I&O's Summary      PHYSICAL EXAM:  Vital Signs Last 24 Hrs  T(C): 37.6 (20 Feb 2019 05:32), Max: 37.6 (19 Feb 2019 21:13)  T(F): 99.6 (20 Feb 2019 05:32), Max: 99.6 (19 Feb 2019 21:13)  HR: 110 (20 Feb 2019 05:32) (92 - 111)  BP: 156/78 (20 Feb 2019 05:32) (156/78 - 230/137)  BP(mean): --  RR: 18 (20 Feb 2019 05:32) (18 - 18)  SpO2: 100% (20 Feb 2019 05:32) (100% - 100%)  GENERAL: NAD, well-developed  HEAD:  Atraumatic, Normocephalic  EYES: EOMI, PERRLA, conjunctiva and sclera clear  NECK: Supple, No JVD  CHEST/LUNG: Clear to auscultation bilaterally; No wheeze  Chest port in place.  HEART: Regular rate and rhythm; No murmurs, rubs, or gallops  ABDOMEN: Soft, Nontender, Nondistended; Bowel sounds present  EXTREMITIES:  2+ Peripheral Pulses, No clubbing, cyanosis, or edema  PSYCH: AAOx3  NEUROLOGY: non-focal  SKIN: No rashes or lesions    LABS:                        8.0    4.84  )-----------( 275      ( 19 Feb 2019 06:15 )             24.9     02-20    135  |  95<L>  |  32<H>  ----------------------------<  160<H>  3.8   |  27  |  1.49<H>    Ca    9.4      20 Feb 2019 06:00  Phos  3.6     02-19  Mg     1.7     02-19                RADIOLOGY & ADDITIONAL TESTS:    Imaging Personally Reviewed:    Consultant(s) Notes Reviewed:      Care Discussed with Consultants/Other Providers:

## 2019-02-20 NOTE — PROGRESS NOTE ADULT - SUBJECTIVE AND OBJECTIVE BOX
HPI:  Feels stronger  Diarrhea improving    REVIEW OF SYSTEMS:    CONSTITUTIONAL: No weakness, fevers or chills, weight loss  EYES/ENT: No visual changes, no throat pain   RESPIRATORY: No cough, wheezing, hemoptysis; No shortness of breath  CARDIOVASCULAR: No chest pain or palpitations  GASTROINTESTINAL: No abdominal, nausea, vomiting, or hematemesis; No diarrhea or constipation. No melena or hematochezia.  GENITOURINARY: No dysuria, frequency or hematuria  NEUROLOGICAL: No dizziness, numbness, or weakness  SKIN: No itching, burning, rashes, or lesions   All other review of systems is negative unless indicated above.    VITAL SIGNS:    T99.8 P110 /78, RR18    PHYSICAL EXAM:     GENERAL: no acute distress  HEENT: NC/AT, EOMI, neck supple, MMM  RESPIRATORY: LCTAB/L, no rhonchi, rales, or wheezing  CARDIOVASCULAR: RRR, no murmurs, gallops, rubs  ABDOMINAL: soft, non-tender, non-distended, positive bowel sounds   NEUROLOGICAL: alert and oriented x 3, non-focal  LYMPHATIC: lymphatic: cervical, supraclavicular, axilla, inguinal                        8.0    4.84  )-----------( 275      ( 19 Feb 2019 06:15 )             24.9     02-20    135  |  95<L>  |  32<H>  ----------------------------<  160<H>  3.8   |  27  |  1.49<H>    Ca    9.4      20 Feb 2019 06:00  Phos  3.6     02-19  Mg     1.7     02-19        MEDICATIONS  (STANDING):  amLODIPine   Tablet 10 milliGRAM(s) Oral daily  chlorhexidine 4% Liquid 1 Application(s) Topical <User Schedule>  colchicine 0.6 milliGRAM(s) Oral daily  dextrose 5%. 1000 milliLiter(s) (50 mL/Hr) IV Continuous <Continuous>  dextrose 50% Injectable 12.5 Gram(s) IV Push once  dextrose 50% Injectable 25 Gram(s) IV Push once  dextrose 50% Injectable 25 Gram(s) IV Push once  epoetin diogo Injectable 19322 Unit(s) SubCutaneous <User Schedule>  insulin lispro (HumaLOG) corrective regimen sliding scale   SubCutaneous at bedtime  insulin lispro (HumaLOG) corrective regimen sliding scale   SubCutaneous three times a day before meals  levoFLOXacin  Tablet 250 milliGRAM(s) Oral every 24 hours  loperamide 2 milliGRAM(s) Oral every 6 hours  ondansetron    Tablet 4 milliGRAM(s) Oral once  sodium chloride 0.45%. 1000 milliLiter(s) (50 mL/Hr) IV Continuous <Continuous>  sucralfate 1 Gram(s) Oral every 6 hours

## 2019-02-20 NOTE — PROGRESS NOTE ADULT - NSHPATTENDINGPLANDISCUSS_GEN_ALL_CORE
NP
med
med
MICU
MICU team
med
patient
team
patient
patient/ daughter- Sagrario / NABIL
patient and daughter- Jaquiline / GI

## 2019-02-21 LAB
BACTERIA BLD CULT: SIGNIFICANT CHANGE UP
BACTERIA BLD CULT: SIGNIFICANT CHANGE UP

## 2019-03-08 ENCOUNTER — EMERGENCY (EMERGENCY)
Facility: HOSPITAL | Age: 74
LOS: 1 days | Discharge: HOME | End: 2019-03-08
Attending: EMERGENCY MEDICINE | Admitting: EMERGENCY MEDICINE
Payer: MEDICARE

## 2019-03-08 VITALS
HEART RATE: 100 BPM | SYSTOLIC BLOOD PRESSURE: 157 MMHG | DIASTOLIC BLOOD PRESSURE: 87 MMHG | OXYGEN SATURATION: 100 % | RESPIRATION RATE: 16 BRPM | TEMPERATURE: 98 F

## 2019-03-08 DIAGNOSIS — Z98.890 OTHER SPECIFIED POSTPROCEDURAL STATES: Chronic | ICD-10-CM

## 2019-03-08 DIAGNOSIS — Z96.651 PRESENCE OF RIGHT ARTIFICIAL KNEE JOINT: Chronic | ICD-10-CM

## 2019-03-08 DIAGNOSIS — D17.0 BENIGN LIPOMATOUS NEOPLASM OF SKIN AND SUBCUTANEOUS TISSUE OF HEAD, FACE AND NECK: Chronic | ICD-10-CM

## 2019-03-08 DIAGNOSIS — D17.39 BENIGN LIPOMATOUS NEOPLASM OF SKIN AND SUBCUTANEOUS TISSUE OF OTHER SITES: Chronic | ICD-10-CM

## 2019-03-08 DIAGNOSIS — Z90.49 ACQUIRED ABSENCE OF OTHER SPECIFIED PARTS OF DIGESTIVE TRACT: Chronic | ICD-10-CM

## 2019-03-08 DIAGNOSIS — Z90.721 ACQUIRED ABSENCE OF OVARIES, UNILATERAL: Chronic | ICD-10-CM

## 2019-03-08 LAB
ALBUMIN SERPL ELPH-MCNC: 3.5 G/DL — SIGNIFICANT CHANGE UP (ref 3.3–5)
ALP SERPL-CCNC: 99 U/L — SIGNIFICANT CHANGE UP (ref 40–120)
ALT FLD-CCNC: 14 U/L — SIGNIFICANT CHANGE UP (ref 4–33)
ANION GAP SERPL CALC-SCNC: 12 MMO/L — SIGNIFICANT CHANGE UP (ref 7–14)
APTT BLD: 38.5 SEC — HIGH (ref 27.5–36.3)
AST SERPL-CCNC: 20 U/L — SIGNIFICANT CHANGE UP (ref 4–32)
BASOPHILS # BLD AUTO: 0.01 K/UL — SIGNIFICANT CHANGE UP (ref 0–0.2)
BASOPHILS NFR BLD AUTO: 0.1 % — SIGNIFICANT CHANGE UP (ref 0–2)
BILIRUB SERPL-MCNC: 0.3 MG/DL — SIGNIFICANT CHANGE UP (ref 0.2–1.2)
BLD GP AB SCN SERPL QL: NEGATIVE — SIGNIFICANT CHANGE UP
BUN SERPL-MCNC: 24 MG/DL — HIGH (ref 7–23)
CALCIUM SERPL-MCNC: 10.1 MG/DL — SIGNIFICANT CHANGE UP (ref 8.4–10.5)
CHLORIDE SERPL-SCNC: 107 MMOL/L — SIGNIFICANT CHANGE UP (ref 98–107)
CO2 SERPL-SCNC: 21 MMOL/L — LOW (ref 22–31)
CREAT SERPL-MCNC: 1.38 MG/DL — HIGH (ref 0.5–1.3)
EOSINOPHIL # BLD AUTO: 0.07 K/UL — SIGNIFICANT CHANGE UP (ref 0–0.5)
EOSINOPHIL NFR BLD AUTO: 1 % — SIGNIFICANT CHANGE UP (ref 0–6)
GLUCOSE SERPL-MCNC: 86 MG/DL — SIGNIFICANT CHANGE UP (ref 70–99)
HCT VFR BLD CALC: 24.2 % — LOW (ref 34.5–45)
HGB BLD-MCNC: 7.1 G/DL — LOW (ref 11.5–15.5)
IMM GRANULOCYTES NFR BLD AUTO: 0.6 % — SIGNIFICANT CHANGE UP (ref 0–1.5)
INR BLD: 1.28 — HIGH (ref 0.88–1.17)
LYMPHOCYTES # BLD AUTO: 1.65 K/UL — SIGNIFICANT CHANGE UP (ref 1–3.3)
LYMPHOCYTES # BLD AUTO: 24.1 % — SIGNIFICANT CHANGE UP (ref 13–44)
MCHC RBC-ENTMCNC: 27 PG — SIGNIFICANT CHANGE UP (ref 27–34)
MCHC RBC-ENTMCNC: 29.3 % — LOW (ref 32–36)
MCV RBC AUTO: 92 FL — SIGNIFICANT CHANGE UP (ref 80–100)
MONOCYTES # BLD AUTO: 0.69 K/UL — SIGNIFICANT CHANGE UP (ref 0–0.9)
MONOCYTES NFR BLD AUTO: 10.1 % — SIGNIFICANT CHANGE UP (ref 2–14)
NEUTROPHILS # BLD AUTO: 4.38 K/UL — SIGNIFICANT CHANGE UP (ref 1.8–7.4)
NEUTROPHILS NFR BLD AUTO: 64.1 % — SIGNIFICANT CHANGE UP (ref 43–77)
NRBC # FLD: 0 K/UL — LOW (ref 25–125)
PLATELET # BLD AUTO: 309 K/UL — SIGNIFICANT CHANGE UP (ref 150–400)
PMV BLD: 8.9 FL — SIGNIFICANT CHANGE UP (ref 7–13)
POTASSIUM SERPL-MCNC: 5 MMOL/L — SIGNIFICANT CHANGE UP (ref 3.5–5.3)
POTASSIUM SERPL-SCNC: 5 MMOL/L — SIGNIFICANT CHANGE UP (ref 3.5–5.3)
PROT SERPL-MCNC: 7.1 G/DL — SIGNIFICANT CHANGE UP (ref 6–8.3)
PROTHROM AB SERPL-ACNC: 14.3 SEC — HIGH (ref 9.8–13.1)
RBC # BLD: 2.63 M/UL — LOW (ref 3.8–5.2)
RBC # FLD: 20 % — HIGH (ref 10.3–14.5)
RH IG SCN BLD-IMP: POSITIVE — SIGNIFICANT CHANGE UP
SODIUM SERPL-SCNC: 140 MMOL/L — SIGNIFICANT CHANGE UP (ref 135–145)
WBC # BLD: 6.84 K/UL — SIGNIFICANT CHANGE UP (ref 3.8–10.5)
WBC # FLD AUTO: 6.84 K/UL — SIGNIFICANT CHANGE UP (ref 3.8–10.5)

## 2019-03-08 PROCEDURE — 99218: CPT | Mod: GC

## 2019-03-08 RX ORDER — ALLOPURINOL 300 MG
100 TABLET ORAL DAILY
Qty: 0 | Refills: 0 | Status: DISCONTINUED | OUTPATIENT
Start: 2019-03-08 | End: 2019-03-12

## 2019-03-08 RX ORDER — GLIMEPIRIDE 1 MG
1 TABLET ORAL
Qty: 0 | Refills: 0 | Status: DISCONTINUED | OUTPATIENT
Start: 2019-03-08 | End: 2019-03-12

## 2019-03-08 RX ORDER — CARVEDILOL PHOSPHATE 80 MG/1
3.12 CAPSULE, EXTENDED RELEASE ORAL EVERY 12 HOURS
Qty: 0 | Refills: 0 | Status: DISCONTINUED | OUTPATIENT
Start: 2019-03-08 | End: 2019-03-08

## 2019-03-08 RX ORDER — CARVEDILOL PHOSPHATE 80 MG/1
12.5 CAPSULE, EXTENDED RELEASE ORAL EVERY 12 HOURS
Qty: 0 | Refills: 0 | Status: DISCONTINUED | OUTPATIENT
Start: 2019-03-08 | End: 2019-03-12

## 2019-03-08 RX ORDER — AMLODIPINE BESYLATE 2.5 MG/1
10 TABLET ORAL DAILY
Qty: 0 | Refills: 0 | Status: DISCONTINUED | OUTPATIENT
Start: 2019-03-08 | End: 2019-03-12

## 2019-03-08 RX ORDER — LISINOPRIL 2.5 MG/1
40 TABLET ORAL DAILY
Qty: 0 | Refills: 0 | Status: DISCONTINUED | OUTPATIENT
Start: 2019-03-08 | End: 2019-03-12

## 2019-03-08 NOTE — ED PROVIDER NOTE - OBJECTIVE STATEMENT
74 year old with pmhx of DM, breast ca, CKD, presenting with abnormal lab. Had blood drawn last week by oncologist, got call today that Hb was 7.0, which she was recommended to go to Ed for possible blood transfusion.    Denies any pain, any LOC, or lightheadedness.

## 2019-03-08 NOTE — ED PROVIDER NOTE - PROGRESS NOTE DETAILS
Polina Lisa PGY1  Spoke to Dr. Ruiz, recommended transfusion due to tachycardia in office and new low Hb. Will CDU for transfusion

## 2019-03-08 NOTE — ED CDU PROVIDER INITIAL DAY NOTE - OBJECTIVE STATEMENT
ED Provider Note: 74 year old with pmhx of DM, breast ca, CKD, presenting with abnormal lab. Had blood drawn last week by oncologist, got call today that Hb was 7.0, which she was recommended to go to Ed for possible blood transfusion.  CDU Initial Day Note: SUSANA Andrew, Agree w/ above, pt w/ no acute complaints, was symptomatically anemic at Oncologist office (tachycardic in office) no longer tachy. Denies chest pain sob, n/v/d, abd pain, black/dark stools, hematochezia. Pt is not on AC, pt feels well at this time. Pt will receive 1 unit PRBC's and have labs re-drawn. Will discuss w/ Onc fellow after transfusion.

## 2019-03-08 NOTE — ED ADULT TRIAGE NOTE - CHIEF COMPLAINT QUOTE
Pt states that she had blood drawn 8 days ago and was called today and told that her HGB is low.  Pt states that she is chronically anemic and was recently discharged from this facility 2 weeks ago. Pt states that she would not have come to the ED if she had not been called.  Final chemo was on 1/28/19. PMH HTN, right breast CA, CKD

## 2019-03-08 NOTE — ED CDU PROVIDER INITIAL DAY NOTE - ATTENDING CONTRIBUTION TO CARE
I performed a face-to-face evaluation of the patient and performed a history and physical examination. I agree with the history and physical examination.    Older F, breast cancer, getting chemo. Mild general weakness. Sent by Onc. to get PRBCs for Hb 7.1 Not losing blood anywhere. Appears well. NAD. Afebrile. Vital signs unremarkable. Will admit to CDU for PRBC transfusion.

## 2019-03-08 NOTE — ED ADULT NURSE NOTE - OBJECTIVE STATEMENT
pt to rm 19. alert,oriented x3.   reports received  call from  md stating  abn labs  8 days ago. denies pain,fever.  reports increased tiredness.  pt with thelma christopher,awaiting labs.  awaits md james. will continue to monitor

## 2019-03-08 NOTE — ED PROVIDER NOTE - CLINICAL SUMMARY MEDICAL DECISION MAKING FREE TEXT BOX
74 year old female with pmhx of DM breast ca CKD presenting for low Hb from lab. Possible new acute anemia, however previous labs show Hb between 7-10. Will get CBC CMP and will reassess if needed for blood transfusion. 74 year old female with pmhx of DM breast ca CKD presenting for low Hb from lab. Possible new acute anemia, however previous labs show Hb between 7-10. Will get CBC CMP and will reassess if needed for blood transfusion.  Keanu: Patient sent for low hct, feels well. previous hcts also low. will observe and get serial hcts. exam nl.

## 2019-03-09 VITALS
TEMPERATURE: 98 F | HEART RATE: 84 BPM | RESPIRATION RATE: 16 BRPM | SYSTOLIC BLOOD PRESSURE: 142 MMHG | DIASTOLIC BLOOD PRESSURE: 62 MMHG | OXYGEN SATURATION: 98 %

## 2019-03-09 LAB
BASOPHILS # BLD AUTO: 0.01 K/UL — SIGNIFICANT CHANGE UP (ref 0–0.2)
BASOPHILS NFR BLD AUTO: 0.2 % — SIGNIFICANT CHANGE UP (ref 0–2)
EOSINOPHIL # BLD AUTO: 0.08 K/UL — SIGNIFICANT CHANGE UP (ref 0–0.5)
EOSINOPHIL NFR BLD AUTO: 1.5 % — SIGNIFICANT CHANGE UP (ref 0–6)
HCT VFR BLD CALC: 25.6 % — LOW (ref 34.5–45)
HGB BLD-MCNC: 8 G/DL — LOW (ref 11.5–15.5)
IMM GRANULOCYTES NFR BLD AUTO: 0.8 % — SIGNIFICANT CHANGE UP (ref 0–1.5)
LYMPHOCYTES # BLD AUTO: 1.22 K/UL — SIGNIFICANT CHANGE UP (ref 1–3.3)
LYMPHOCYTES # BLD AUTO: 23 % — SIGNIFICANT CHANGE UP (ref 13–44)
MCHC RBC-ENTMCNC: 28.3 PG — SIGNIFICANT CHANGE UP (ref 27–34)
MCHC RBC-ENTMCNC: 31.3 % — LOW (ref 32–36)
MCV RBC AUTO: 90.5 FL — SIGNIFICANT CHANGE UP (ref 80–100)
MONOCYTES # BLD AUTO: 0.59 K/UL — SIGNIFICANT CHANGE UP (ref 0–0.9)
MONOCYTES NFR BLD AUTO: 11.1 % — SIGNIFICANT CHANGE UP (ref 2–14)
NEUTROPHILS # BLD AUTO: 3.37 K/UL — SIGNIFICANT CHANGE UP (ref 1.8–7.4)
NEUTROPHILS NFR BLD AUTO: 63.4 % — SIGNIFICANT CHANGE UP (ref 43–77)
NRBC # FLD: 0 K/UL — LOW (ref 25–125)
PLATELET # BLD AUTO: 265 K/UL — SIGNIFICANT CHANGE UP (ref 150–400)
PMV BLD: 9.1 FL — SIGNIFICANT CHANGE UP (ref 7–13)
RBC # BLD: 2.83 M/UL — LOW (ref 3.8–5.2)
RBC # FLD: 18.3 % — HIGH (ref 10.3–14.5)
WBC # BLD: 5.31 K/UL — SIGNIFICANT CHANGE UP (ref 3.8–10.5)
WBC # FLD AUTO: 5.31 K/UL — SIGNIFICANT CHANGE UP (ref 3.8–10.5)

## 2019-03-09 PROCEDURE — 99217: CPT | Mod: GC

## 2019-03-09 RX ORDER — ACETAMINOPHEN 500 MG
650 TABLET ORAL ONCE
Qty: 0 | Refills: 0 | Status: COMPLETED | OUTPATIENT
Start: 2019-03-09 | End: 2019-03-09

## 2019-03-09 RX ADMIN — Medication 650 MILLIGRAM(S): at 09:02

## 2019-03-09 NOTE — ED CDU PROVIDER DISPOSITION NOTE - CLINICAL COURSE
Pt is a 75 y/o female with PMHx of DM, Breast CA, and CKD who presented to the ED for low Hg. Pt notes over the past week having some increased tiredness with minimal exertion. Pt was seen by her Oncologist who did blood work and called to tell her that her Hg was 7 and advised to come to the ED for a transfusion. Pt denied any fever, chills, nausea, vomiting, SOB, chest pain, or abd pain. Denied any blood in stool or urine. Sent to OBS to receive 1 Unit of pRBCs. After received 1 Unit of pRBCs pt's repeat Hg was 8. Pt stated feeling better. Ambulating without the previus reports of feeling tired. Discussed with pt's Oncologist who felt pt could f/u as outpt. Lungs CTA b/l. RRR. Abd soft, non-tender.

## 2019-03-09 NOTE — ED CDU PROVIDER SUBSEQUENT DAY NOTE - PROGRESS NOTE DETAILS
Pt rested comfortably thru the night without complaints.  Hgb anuja to 8, pts baseline. Feeling well.  Emily fernandez in AM Spoke with after hours  from [facility] (Angela) - left a message for Dr. Ruiz or covering physician to discuss the patients plan of care. Awaiting call back. Spoke with Dr. Joseph ackerman for dc. Will follow up with the patient in 1 week outpatient. Pt NAD, VSS, no acute complaints. She is asymptomatic. Ambulating to and from the restroom without feeling fatigued. Discussed the results of the labs. Advised to f.u with Dr. Ruiz. Pt tyron aguilar dc.

## 2019-03-09 NOTE — ED CDU PROVIDER SUBSEQUENT DAY NOTE - MEDICAL DECISION MAKING DETAILS
Anemia-  sent by onc for 1 unit and rpt cbc.   Pt asx at this time, completed 1 unit, will repeat cbc approx 2:30

## 2019-03-09 NOTE — ED CDU PROVIDER SUBSEQUENT DAY NOTE - HISTORY
ED Provider Note: 74 year old with pmhx of DM, breast ca, CKD, presenting with abnormal lab. Had blood drawn last week by oncologist, got call today that Hb was 7.0, which she was recommended to go to Ed for possible blood transfusion.  CDU Initial Day Note: SUSANA Andrew, Agree w/ above, pt w/ no acute complaints, was symptomatically anemic at Oncologist office (tachycardic in office) no longer tachy. Denies chest pain sob, n/v/d, abd pain, black/dark stools, hematochezia. Pt is not on AC, pt feels well at this time. Pt will receive 1 unit PRBC's and have labs re-drawn. Will discuss w/ Onc fellow after transfusion.  Pt resting comfortably in CDU over night.  Feeling well without any complaints.  Completed 1 unit without difficulty.  Lungs clear.   Plan for repeat CBC about 2:30.

## 2019-03-09 NOTE — ED CDU PROVIDER DISPOSITION NOTE - NSFOLLOWUPINSTRUCTIONS_ED_ALL_ED_FT
Please continue all home medications as directed. See your regular doctor within 72 hours for follow-up care. Return to ER for new or worsening symptoms.

## 2019-03-09 NOTE — ED CDU PROVIDER SUBSEQUENT DAY NOTE - ATTENDING CONTRIBUTION TO CARE
Pt was seen and evaluated by me. Pt is a 73 y/o female with PMHx of DM, Breast CA, and CKD who presented to the ED for low Hg. Pt notes over the past week having some increased tiredness with minimal exertion. Pt was seen by her Oncologist who did blood work and called to tell her that her Hg was 7 and advised to come to the ED for a transfusion. Pt denies any fever, chills, nausea, vomiting, SOB, chest pain, or abd pain. Denies any blood in stool or urine. Sent to OBS to receive 1 Unit of pRBCs. Lungs CTA b/l. RRR. Abd soft, non-tender.

## 2019-03-21 NOTE — CONSULT NOTE ADULT - PROBLEM SELECTOR RECOMMENDATION 6
continue present treatment prob due to dehydration & met sepsis syndrome  will monitor intact/to pathology/manual removal

## 2019-04-25 ENCOUNTER — APPOINTMENT (OUTPATIENT)
Dept: PULMONOLOGY | Facility: CLINIC | Age: 74
End: 2019-04-25
Payer: MEDICARE

## 2019-04-25 VITALS
HEART RATE: 77 BPM | BODY MASS INDEX: 25.71 KG/M2 | DIASTOLIC BLOOD PRESSURE: 62 MMHG | OXYGEN SATURATION: 100 % | RESPIRATION RATE: 12 BRPM | HEIGHT: 66 IN | SYSTOLIC BLOOD PRESSURE: 136 MMHG | WEIGHT: 160 LBS | TEMPERATURE: 98.6 F

## 2019-04-25 DIAGNOSIS — Z86.39 PERSONAL HISTORY OF OTHER ENDOCRINE, NUTRITIONAL AND METABOLIC DISEASE: ICD-10-CM

## 2019-04-25 DIAGNOSIS — Z85.3 PERSONAL HISTORY OF MALIGNANT NEOPLASM OF BREAST: ICD-10-CM

## 2019-04-25 DIAGNOSIS — Z86.79 PERSONAL HISTORY OF OTHER DISEASES OF THE CIRCULATORY SYSTEM: ICD-10-CM

## 2019-04-25 DIAGNOSIS — J18.9 PNEUMONIA, UNSPECIFIED ORGANISM: ICD-10-CM

## 2019-04-25 PROCEDURE — 94060 EVALUATION OF WHEEZING: CPT

## 2019-04-25 PROCEDURE — 94727 GAS DIL/WSHOT DETER LNG VOL: CPT

## 2019-04-25 PROCEDURE — 99214 OFFICE O/P EST MOD 30 MIN: CPT | Mod: 25

## 2019-04-25 PROCEDURE — 94729 DIFFUSING CAPACITY: CPT

## 2019-04-25 PROCEDURE — 99204 OFFICE O/P NEW MOD 45 MIN: CPT | Mod: 25

## 2019-04-26 PROBLEM — Z85.3 HISTORY OF MALIGNANT NEOPLASM OF RIGHT BREAST: Status: RESOLVED | Noted: 2019-04-26 | Resolved: 2019-04-26

## 2019-04-26 PROBLEM — Z85.3 HISTORY OF MALIGNANT NEOPLASM OF LEFT BREAST: Status: RESOLVED | Noted: 2019-04-26 | Resolved: 2019-04-26

## 2019-04-26 RX ORDER — ALLOPURINOL 100 MG/1
100 TABLET ORAL
Refills: 0 | Status: ACTIVE | COMMUNITY

## 2019-04-26 NOTE — REVIEW OF SYSTEMS
[Dyspnea] : no dyspnea [Palpitations] : no palpitations [Heartburn] : no heartburn [Nasal Discharge] : no nasal discharge [Edema] : ~T edema was not present [Back Pain] : ~T no back pain [Difficulty Initiating Sleep] : no difficulty falling asleep [Rash] : no [unfilled] rash

## 2019-04-26 NOTE — DISCUSSION/SUMMARY
[FreeTextEntry1] : 73 y/o woman with h/o HTN, gout, DM and breast CA (s/p surgery, chemo and RT) admitted to [facility] in February 2019 with Legionella pneumonia. \par \par Doing very well now. CXR findiings noted on 3/19/19 most likely represent residual disease. No further investigation needed now. Should have follow up CXR in three months to assess for complete resolution. \par \par I would like to see her again in three months.

## 2019-04-26 NOTE — PHYSICAL EXAM
[General Appearance - In No Acute Distress] : no acute distress [Heart Sounds] : normal S1 and S2 [Neck Cervical Mass (___cm)] : no neck mass was observed [] : no hepato-splenomegaly [Auscultation Breath Sounds / Voice Sounds] : lungs were clear to auscultation bilaterally [Abnormal Walk] : normal gait [Cyanosis, Localized] : no localized cyanosis [Tunneled Catheter] : Tunneled Catheter [Chest] : chest [Right] : right [Skin Turgor] : normal skin turgor [Oriented To Time, Place, And Person] : oriented to person, place, and time [Impaired Insight] : insight and judgment were intact [Erythema] : no erythema of the pharynx

## 2019-04-26 NOTE — PROCEDURE
[FreeTextEntry1] : CXR 2/9/19 at [facility]: Consolidation in the RUL noted. \par \par CXR 3/1919: Discoid atelectasis in right mid-lung. No pleural effusion. Elevation of right hemidiaphragm. Chemoport on right. \par \par Chest CT 2/7/29: Dense consolidation in the RUL. No PE. \par \par PFT 4/25/19: Spirometry was normal. Lung volumes were normal. Diffusion capacity mildly reduced. Improvement after bronchodilator noted.

## 2019-04-26 NOTE — HISTORY OF PRESENT ILLNESS
[FreeTextEntry1] : 75 y/o woman who was hospitalized in February for pneumonia. She is feeling better. No c/o cough, chest pain or SOB. No fever or chills. Legionella urinary antigen February 2019 was positive. No h/o pulmonary disease or pneumonia in the past. Never smoked.

## 2019-10-24 ENCOUNTER — APPOINTMENT (OUTPATIENT)
Dept: PULMONOLOGY | Facility: CLINIC | Age: 74
End: 2019-10-24

## 2019-12-18 NOTE — H&P PST ADULT - NSANTHTIREDRD_ENT_A_CORE
----- Message from HEATHER Neely sent at 12/18/2019 10:28 AM CST -----   would like to request a Rx refill for Lorazepam. Please contact patient to verify and confirm Rx and pharmacy. Thank you!  
Spoke to patient. Notified Dr Chen is out of office will be back tomorrow notified that will notify send request to Dr Chen for when she returns patient states understanding  
No

## 2020-07-20 NOTE — PROGRESS NOTE ADULT - PROBLEM SELECTOR PLAN 4
Call returned to [facility]. Patient denies any complaints related to anticoagulation therapy at this time. Patient reports no change in medication or diet. Reinforced with patient to call clinic with any medication changes as this can impact INR. Reinforced signs and symptoms bleeding/clotting with patient. Patient aware to seek medical care if signs and symptoms develop. Advised that if patient falls and/or hits their head, they should seek medical attention. Verbalizes understanding.     Dosing instructions given to patient verbally over the phone. Advised to call the clinic with any questions or concerns. Patient verbalizes understanding. Has clinic number.    Anticoagulation Summary  As of 7/20/2020    INR goal:   2.0-3.0   TTR:   47.8 % (8.8 y)   INR used for dosing:   3.5! (7/20/2020)   Warfarin maintenance plan:   7.5 mg (5 mg x 1.5) every Kay, W, F; 5 mg (5 mg x 1) all other days   Weekly warfarin total:   42.5 mg   Plan last modified:   Milvia East RN (7/20/2020)   Next INR check:   7/27/2020   Priority:   Follow-Up - 2 Weeks   Target end date:   Indefinite    Indications    Atrial fibrillation  unspecified type (CMS/HCC) (Resolved) [I48.91]  Long term current use of anticoagulant therapy [Z79.01]  Encounter for therapeutic drug monitoring [Z51.81]  Long term (current) use of anticoagulants [Z79.01]             Anticoagulation Episode Summary     INR check location:   Outside Lab    Preferred lab:       Send INR reminders to:   ANTICOAG (OPEN ENROLLMENT) ACS CARD/EP    Comments:   *Next STAC due 4/29/21; CCLab 1/2020; Roche monitor; 5mg tabs; Pt is a retired nurse; 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 Cristian Grimaldo       [facility]

## 2021-06-20 NOTE — H&P PST ADULT - SKIN/BREAST COMMENTS
Letter by Rose Morales EPS at      Author: Rose Morales EPS Service: -- Author Type: --    Filed:  Encounter Date: 2/24/2020 Status: (Other)         Kim Correa  [address]  [address]  [place] [id number]      February 24, 2020      Dear Ms. Correa,      We are writing to let you know that the remote monitor for your pace-maker is not connecting.     We called you about your disconnected monitor on January 22, 2020. We tried to re-connect your monitor at that time, but we couldn't. We asked you to contact [employer] for help.     As soon as you can, please call our clinic so we can help you get your monitor re-connected.   Call [phone] and pick option 2.       Sincerely,    [facility]         
bilateral breasts malignancy

## 2021-07-16 NOTE — PROGRESS NOTE ADULT - PROBLEM SELECTOR PLAN 1
Please proceed with infusion -Met sepsis criteria in the ED (T 39.4-39.7 C, -124, RR 20, lactate 2.9), with Resolved Sepsis  Complete iv Azythro 2/23- ID rec change to Levaquin since Azythro can cause diarrhea

## 2021-07-18 ENCOUNTER — EMERGENCY (EMERGENCY)
Facility: HOSPITAL | Age: 76
LOS: 1 days | Discharge: HOME | End: 2021-07-18
Attending: EMERGENCY MEDICINE | Admitting: EMERGENCY MEDICINE
Payer: MEDICARE

## 2021-07-18 VITALS
HEIGHT: 67 IN | OXYGEN SATURATION: 99 % | TEMPERATURE: 98 F | RESPIRATION RATE: 16 BRPM | SYSTOLIC BLOOD PRESSURE: 158 MMHG | DIASTOLIC BLOOD PRESSURE: 63 MMHG | HEART RATE: 72 BPM

## 2021-07-18 VITALS
RESPIRATION RATE: 16 BRPM | DIASTOLIC BLOOD PRESSURE: 65 MMHG | HEART RATE: 68 BPM | OXYGEN SATURATION: 100 % | SYSTOLIC BLOOD PRESSURE: 138 MMHG

## 2021-07-18 DIAGNOSIS — Z90.49 ACQUIRED ABSENCE OF OTHER SPECIFIED PARTS OF DIGESTIVE TRACT: Chronic | ICD-10-CM

## 2021-07-18 DIAGNOSIS — Z98.890 OTHER SPECIFIED POSTPROCEDURAL STATES: Chronic | ICD-10-CM

## 2021-07-18 DIAGNOSIS — D17.39 BENIGN LIPOMATOUS NEOPLASM OF SKIN AND SUBCUTANEOUS TISSUE OF OTHER SITES: Chronic | ICD-10-CM

## 2021-07-18 DIAGNOSIS — Z96.651 PRESENCE OF RIGHT ARTIFICIAL KNEE JOINT: Chronic | ICD-10-CM

## 2021-07-18 DIAGNOSIS — Z90.721 ACQUIRED ABSENCE OF OVARIES, UNILATERAL: Chronic | ICD-10-CM

## 2021-07-18 DIAGNOSIS — D17.0 BENIGN LIPOMATOUS NEOPLASM OF SKIN AND SUBCUTANEOUS TISSUE OF HEAD, FACE AND NECK: Chronic | ICD-10-CM

## 2021-07-18 PROCEDURE — 99284 EMERGENCY DEPT VISIT MOD MDM: CPT

## 2021-07-18 RX ORDER — MECLIZINE HCL 12.5 MG
25 TABLET ORAL ONCE
Refills: 0 | Status: COMPLETED | OUTPATIENT
Start: 2021-07-18 | End: 2021-07-18

## 2021-07-18 RX ORDER — MECLIZINE HCL 12.5 MG
1 TABLET ORAL
Qty: 15 | Refills: 0
Start: 2021-07-18 | End: 2021-07-22

## 2021-07-18 RX ADMIN — Medication 25 MILLIGRAM(S): at 18:16

## 2021-07-18 NOTE — ED ADULT NURSE NOTE - NSIMPLEMENTINTERV_GEN_ALL_ED
Implemented All Universal Safety Interventions:  [place] to call system. Call bell, personal items and telephone within reach. Instruct patient to call for assistance. Room bathroom lighting operational. Non-slip footwear when patient is off stretcher. Physically safe environment: no spills, clutter or unnecessary equipment. Stretcher in lowest position, wheels locked, appropriate side rails in place.

## 2021-07-18 NOTE — ED PROVIDER NOTE - CLINICAL SUMMARY MEDICAL DECISION MAKING FREE TEXT BOX
76F here for dizziness x 1 week non-responsive to debrox without any red flags for central vertigo. VSS. PE significant for dizziness with [place] Hallpike maneuver, Likely BPPV will trial on meclizine and reassess. Likely dc.

## 2021-07-18 NOTE — ED PROVIDER NOTE - OBJECTIVE STATEMENT
76F PMH of DM, breast ca, CKD, here for dizziness x 1 week. States that she used debrox early last week and that got rid of the feeling but now it is back and constant. Had a similar episode years ago and had "stone" removed from ear. States that she feels like the room is spinning around her. (-) blurry and double vision, HA, nausea, vomiting, fever, diarrhea, generalized weakness, hearing changes, tinnitus, laterality that makes dizziness worse. 76F PMH of DM, breast ca, CKD, here for dizziness x 1 week. States that she used debrox early last week and that got rid of the feeling but now it is back and constant. Had a similar episode years ago and had "stone" removed from ear. States that she feels like the room is spinning around her. (-) blurry and double vision, HA, nausea, vomiting, fever, diarrhea, generalized weakness, hearing changes, tinnitus, laterality that makes dizziness worse.  Attending - Agree with above.  I evaluated patient myself. 77 y/o F with PMH as noted.  c/o room spinning/vertigo x week.  + positional with worsening with movement of head, not present when lying still or walking without moving her head.  Reports 2 previous episodes of vertigo since 2019, and found to have a wax impaction in one ear.  Reports vertigo better when she used Debrox, but returned.  Denies h/a, blurry vision, neck stiffness.  Notes b/l numbness in fingers since getting chemo in 2019, but no other numbness, no weakness.  No head trauma or fall.

## 2021-07-18 NOTE — ED PROVIDER NOTE - PHYSICAL EXAMINATION
Gen: WDWN, NAD  HEENT: EOMI, no nasal discharge, mucous membranes moist, (+) dizziness with [place]  CV: RRR, +S1/S2, no M/R/G  Resp: CTAB, no W/R/R  GI: Abdomen soft non-distended, NTTP, no masses  MSK: No open wounds, no bruising, no LE edema  Neuro: A&Ox4, following commands, moving all four extremities spontaneously  Psych: appropriate mood, denies AH, VH, SI Gen: WDWN, NAD  HEENT: EOMI, no nasal discharge, mucous membranes moist, (+) dizziness with [place]  CV: RRR, +S1/S2, no M/R/G  Resp: CTAB, no W/R/R  GI: Abdomen soft non-distended, NTTP, no masses  MSK: No open wounds, no bruising, no LE edema  Neuro: A&Ox4, following commands, moving all four extremities spontaneously  Psych: appropriate mood, denies AH, VH, SI  ATTENDING PHYSICAL EXAM  GEN - NAD; well appearing; A+O x3  HEAD - NC/AT; EYES/NOSE - PERRL, EOMI, no nystagmus appreciated in any gaze, mucous membranes moist, no discharge; THROAT: Oral cavity and pharynx normal. No inflammation, swelling, exudate, or lesions  NECK: Neck supple, non-tender without lymphadenopathy, no masses, no JVD  PULMONARY - CTA b/l, symmetric breath sounds, no w/r/r  CARDIAC -s1s2, RRR, no M,R,G  ABDOMEN - +NABS, ND, NT, soft, no guarding, no rebound, no masses   BACK - no CVA tenderness, No vertebral or paravertebral tenderness  EXTREMITIES - symmetric pulses, 2+ dp, capillary refill < 2 seconds, no clubbing, no cyanosis, no edema  SKIN - no rash or bruising   NEUROLOGIC - alert, CN 2-12 intact, sensation nl, coordination nl, finger to nose nl, motor 5/5 RUE/LUE/RLE/LLE/EHL/Plantar flexion, gait nl  PSYCH - insight and judgment nl, memory nl, affect nl, thought nl

## 2021-07-18 NOTE — ED ADULT TRIAGE NOTE - CHIEF COMPLAINT QUOTE
Pt states that she has been having dizziness that worsens with movement for the last week.  PMH: HTN, hypothyroid, arthritis, DM2

## 2021-07-18 NOTE — ED PROVIDER NOTE - PROGRESS NOTE DETAILS
Patient feels well.  Vertigo resolved.  Ambulating comfortably.  Stable for discharge.  Understands need for f/u and return precautions.

## 2021-07-18 NOTE — ED PROVIDER NOTE - NSFOLLOWUPCLINICS_GEN_ALL_ED_FT
[facility] General Internal Medicine  General Internal Medicine  [address] [id number]  Phone: [phone]  Fax:   Follow Up Time: 1-3 Days

## 2021-07-18 NOTE — ED PROVIDER NOTE - PATIENT PORTAL LINK FT
You can access the FollowMyHealth Patient Portal offered by [facility] by registering at the following website: [email or web address]. By joining [employer]’s FollowMyHealth portal, you will also be able to view your health information using other applications (apps) compatible with our system.

## 2021-07-18 NOTE — ED ADULT NURSE NOTE - OBJECTIVE STATEMENT
76 year old female, hx of T2DM, HTN, b/l breast cancer (2018).  A&Ox3, ambulatory, c/o progressively worsening dizziness x 1 week, became constant today; Pt. described as "room spinning", denies headache, weakness, fever, chest pain or SOB. reports mild nausea, denies vomiting. Pt. appears well, respirations even, unlabored, equal strength bilateral extremities, neurologically intact. MD at bedside for evaluation, no labs at this time per MD, will continue to monitor.

## 2021-07-18 NOTE — ED PROVIDER NOTE - NSFOLLOWUPINSTRUCTIONS_ED_ALL_ED_FT
DISCHARGE INSTRUCTIONS:    Return to the emergency department if:   •You fall during a BPPV episode and are injured.  •You have a severe headache that does not go away.  •You have new changes in your vision or feel weak or confused.  •You have problems hearing, or you have ringing or buzzing in your ears.    Contact your healthcare provider if:   •Your BPPV symptoms do not go away or they return.  •You have problems with your balance, or you are falling often.  •You have new or increased nausea or vomiting with vertigo.  •You feel anxious or depressed and do not want to leave your home.  •You have questions or concerns about your condition or care.    Medicines:   •Medicines may be recommended or prescribed to treat dizziness or nausea.  •Take your medicine as directed. Contact your healthcare provider if you think your medicine is not helping or if you have side effects. Tell him of her if you are allergic to any medicine. Keep a list of the medicines, vitamins, and herbs you take. Include the amounts, and when and why you take them. Bring the list or the pill bottles to follow-up visits. Carry your medicine list with you in case of an emergency.

## 2021-07-18 NOTE — ED PROVIDER NOTE - NS ED ROS FT
Gen: No fever, normal appetite  Eyes: No eye irritation or discharge  ENT: No ear pain, congestion, sore throat  Resp: No cough or trouble breathing  Cardiovascular: No chest pain or palpitation  Gastroenteric: No nausea/vomiting, diarrhea, constipation  :  No change in urine output; no dysuria  MS: No joint or muscle pain  Skin: No rashes  Neuro: No headache; no abnormal movements; (+) dizziness  Remainder negative, except as per the HPI

## 2021-12-12 NOTE — PROGRESS NOTE ADULT - ATTENDING COMMENTS
Patient physically seen and examined by me. I agree with above findings and plan.   Sepsis due to PNA, +urine legionella Ag, continue IV Zithromax and Zosyn  Acute hypoxic respiratory failure d/t PNA, continue BIPAP and wean off as tolerated   Diarrhea likely d/t recent chemo, stool studies and Cdiff negative   Chemo induced pancytopenia, counts improving s/p PRBC and neupogen, monitor CBC  ISABELLA likely d/t ATN, s/p IVF, avoid nephrotoxic agents, monitor Cr  Syncope likely d/t hypovolemia, s/p IVF, check TTE declines

## 2022-05-30 NOTE — ED ADULT NURSE NOTE - NSFALLRSKINDICATORS_ED_ALL_ED
no Aox3. Pt complaining of Abdominal pain that started on Thursday. RUQ/RLQ tender to touch. Rates pain 7/10. Denies SOB and chest pain. Respirations even and unlabored. Skin warm to touch.

## 2022-07-11 ENCOUNTER — EMERGENCY (EMERGENCY)
Facility: HOSPITAL | Age: 77
LOS: 1 days | Discharge: HOME | End: 2022-07-11
Attending: EMERGENCY MEDICINE | Admitting: EMERGENCY MEDICINE

## 2022-07-11 VITALS — HEIGHT: 67 IN

## 2022-07-11 VITALS
OXYGEN SATURATION: 100 % | RESPIRATION RATE: 16 BRPM | TEMPERATURE: 98 F | DIASTOLIC BLOOD PRESSURE: 68 MMHG | SYSTOLIC BLOOD PRESSURE: 152 MMHG | HEART RATE: 85 BPM

## 2022-07-11 DIAGNOSIS — D17.0 BENIGN LIPOMATOUS NEOPLASM OF SKIN AND SUBCUTANEOUS TISSUE OF HEAD, FACE AND NECK: Chronic | ICD-10-CM

## 2022-07-11 DIAGNOSIS — Z90.49 ACQUIRED ABSENCE OF OTHER SPECIFIED PARTS OF DIGESTIVE TRACT: Chronic | ICD-10-CM

## 2022-07-11 DIAGNOSIS — Z96.651 PRESENCE OF RIGHT ARTIFICIAL KNEE JOINT: Chronic | ICD-10-CM

## 2022-07-11 DIAGNOSIS — D17.39 BENIGN LIPOMATOUS NEOPLASM OF SKIN AND SUBCUTANEOUS TISSUE OF OTHER SITES: Chronic | ICD-10-CM

## 2022-07-11 DIAGNOSIS — Z98.890 OTHER SPECIFIED POSTPROCEDURAL STATES: Chronic | ICD-10-CM

## 2022-07-11 DIAGNOSIS — Z90.721 ACQUIRED ABSENCE OF OVARIES, UNILATERAL: Chronic | ICD-10-CM

## 2022-07-11 PROCEDURE — 99284 EMERGENCY DEPT VISIT MOD MDM: CPT

## 2022-07-11 RX ORDER — IBUPROFEN 200 MG
600 TABLET ORAL ONCE
Refills: 0 | Status: COMPLETED | OUTPATIENT
Start: 2022-07-11 | End: 2022-07-11

## 2022-07-11 RX ORDER — METHOCARBAMOL 500 MG/1
750 TABLET, FILM COATED ORAL ONCE
Refills: 0 | Status: COMPLETED | OUTPATIENT
Start: 2022-07-11 | End: 2022-07-11

## 2022-07-11 RX ADMIN — Medication 600 MILLIGRAM(S): at 21:39

## 2022-07-11 RX ADMIN — METHOCARBAMOL 750 MILLIGRAM(S): 500 TABLET, FILM COATED ORAL at 21:39

## 2022-07-11 NOTE — ED PROVIDER NOTE - PATIENT PORTAL LINK FT
You can access the FollowMyHealth Patient Portal offered by [facility] by registering at the following website: [email or web address]. By joining [employer]’s FollowMyHealth portal, you will also be able to view your health information using other applications (apps) compatible with our system.

## 2022-07-11 NOTE — ED PROVIDER NOTE - NSICDXPASTSURGICALHX_GEN_ALL_CORE_FT
PAST SURGICAL HISTORY:  History of right salpingo-oophorectomy 1994    Lipoma of chest wall sx removal    Lipoma of neck sx removal    S/P appendectomy 1994    S/P carpal tunnel release left hand- 2007    S/P total knee arthroplasty, right 9/12/18

## 2022-07-11 NOTE — ED PROVIDER NOTE - MUSCULOSKELETAL, MLM
Spine appears normal, range of motion is not limited, no muscle. +left wrist joint tenderness. +left mid back muscle spasm

## 2022-07-11 NOTE — ED PROVIDER NOTE - NSICDXPASTMEDICALHX_GEN_ALL_CORE_FT
PAST MEDICAL HISTORY:  Carpal tunnel syndrome on left     Cataract bilateral eyes    CKD (chronic kidney disease)     CKD (chronic kidney disease) stage 3, GFR 30-59 ml/min     Diabetes mellitus, type 2     Gout     Hypertension     Intraductal carcinoma in situ of left breast     Malignant neoplasm of right breast     Ovarian cyst right    Primary osteoarthritis of left knee     Primary osteoarthritis of right knee     Spinal stenosis of lumbosacral region

## 2022-07-11 NOTE — ED PROVIDER NOTE - OBJECTIVE STATEMENT
77-year-old female with a history of on allopurinol presents with left wrist pain.  Patient states also complained of mid left back pain.  States started after a trip to [place] for 4 July.Patient admits to used to be on prednisone but made her blood pressure and sugar elevated and used to be on colchicine but was taken off after her renal mass was removed.  Patient otherwise denies any inability to walk no unsteadiness.  Patient uses a cane daily to walk due to neuropathic pain. Patient wants to minimize other medication secondary concern of side effects because of her history with chemotherapy.  Patient Nuys any fevers has range of motion in her joints but pain with extreme flexion and extreme extension otherwise ambulatory with no knee pain no other pain other than left wrist and left back pain

## 2022-07-11 NOTE — ED PROVIDER NOTE - CLINICAL SUMMARY MEDICAL DECISION MAKING FREE TEXT BOX
Patient presents with a gout exacerbation plus some muscle spasm in the left mid back.  Palpable back pain with no overlying skin changes no pulse no signs of hematoma and no fluctuance.  Will treat with Robaxin ibuprofen and DC home with follow-up with rheumatology provided.

## 2022-07-11 NOTE — ED PROVIDER NOTE - IV ALTEPLASE EXCL REL HIDDEN
Chief Complaint   Patient presents with   • Hypertension     F/V Dx: Essential hypertension       Subjective:   Itz Magaña is a 76 y.o. male who presents today for 6 months follow up.    He is followed by Dr. Chavez in our clinic, history of AAA 4.1cm, coronary arteriosclerosis, and dyslipidemia.    Patient is doing well. He is working out at least 1 hour a day with his wife (Joelle). Denies any chest pain, sob, dizziness or palpitations.     Tolerating medication well.     Past Medical History:   Diagnosis Date   • ASTHMA     scheduled inhaler use   • Bronchitis 02/2018   • Erectile dysfunction 6/8/2016   • GERD (gastroesophageal reflux disease) 6/8/2016   • Heart burn    • History of skin cancer 6/8/2016   • Hyperlipidemia 6/8/2016   • Hypertension    • Hypothyroidism 6/8/2016   • Indigestion    • Obesity 6/8/2016   • Pain 04/19/2018    chronic back pain, 0/10   • Preventative health care 6/8/2016   • Rosacea 6/8/2016   • Seasonal allergies 6/8/2016     Past Surgical History:   Procedure Laterality Date   • LUMBAR FUSION O-ARM N/A 11/19/2018    Procedure: LUMBAR FUSION O-ARM-  POSTERIOR L2-3 TLIF,;  Surgeon: Brandon Fowler M.D.;  Location: [facility];  Service: Neurosurgery   • LUMBAR LAMINECTOMY DISKECTOMY N/A 11/19/2018    Procedure: LUMBAR LAMINECTOMY DISKECTOMY-  L2-3 LAMI;  Surgeon: Brandon Fowler M.D.;  Location: [facility];  Service: Neurosurgery   • HARDWARE REMOVAL NEURO N/A 11/19/2018    Procedure: HARDWARE REMOVAL NEURO-  L4-5, L3 SCREW REPOSITIONING;  Surgeon: Brandon Fowler M.D.;  Location: SURGERY [facility];  Service: Neurosurgery   • KYPHOPLASTY N/A 11/19/2018    Procedure: KYPHOPLASTY-  L2;  Surgeon: Brandon Fowler M.D.;  Location: [facility];  Service: Neurosurgery   • VENTRAL HERNIA REPAIR Left 5/3/2018    Procedure: VENTRAL HERNIA REPAIR FOR LUMBAR HERNIA/ LUNG HERNIA THROUGH CHEST WALL RECONSTRUCTION, MAJOR  ;  Surgeon: Phoenix Medina,  M.D.;  Location: SURGERY [facility];  Service: General   • IRRIGATION & DEBRIDEMENT NEURO  3/30/2017    Procedure: IRRIGATION & DEBRIDEMENT NEURO-LUMBAR WOUND;  Surgeon: Josiah Chakraborty M.D.;  Location: SURGERY [facility];  Service:    • FUSION, SPINE, LUMBAR, PLIF N/A 3/17/2017    Procedure: LUMBAR FUSION POSTERIOR L3-4 EXTENSION;  Surgeon: Josiah Chakraborty M.D.;  Location: SURGERY [facility];  Service:    • LUMBAR LAMINECTOMY DISKECTOMY N/A 3/17/2017    Procedure: LUMBAR LAMINECTOMY DISKECTOMY L3 & REDO L4;  Surgeon: Josiah Chakraborty M.D.;  Location: SURGERY [facility];  Service:    • HARDWARE REMOVAL NEURO N/A 3/17/2017    Procedure: HARDWARE REMOVAL NEURO L4-5;  Surgeon: Josiah Chakraborty M.D.;  Location: SURGERY [facility];  Service:    • FUSION, SPINE, LUMBAR, PLIF  9/9/2009    Performed by JOSIAH CHAKRABORTY at SURGERY [facility]   • LUMBAR LAMINECTOMY DISKECTOMY  9/9/2009    Performed by JOSIAH CHAKRABORTY at SURGERY [facility]   • KNEE ARTHROPLASTY TOTAL  5/26/2009    Performed by NAREN DANIELSON at SURGERY [facility]   • HIP ARTHROPLASTY TOTAL  1/21/2009    Performed by NAREN DANIELSON at SURGERY [facility]   • INGUINAL HERNIA REPAIR  9/2/08    Performed by MERLYN BECKER at SURGERY SAME DAY [facility]   • INGUINAL HERNIA REPAIR  6/10/08    Performed by MERLYN BECKER at SURGERY SAME DAY [facility]   • HERNIA REPAIR  2008    HERNIA    • ROMAN BY LAPAROSCOPY  1982   • APPENDECTOMY  1953   • TONSILLECTOMY  1951   • OTHER      DISCECTOMY WITH HARWWARE   • OTHER      GASTRIC BYPASS AT 29 YEARS     Family History   Problem Relation Age of Onset   • Hypertension Mother    • Dementia Mother    • Hypertension Father    • Lung Disease Father         asthma     Social History     Socioeconomic History   • Marital status:      Spouse name: Not on file   • Number of children: Not on file   • Years of education: Not on file   • Highest education level: Not on file    Occupational History   • Not on file   Tobacco Use   • Smoking status: Never Smoker   • Smokeless tobacco: Never Used   Vaping Use   • Vaping Use: Never used   Substance and Sexual Activity   • Alcohol use: Yes     Comment: 1/week    • Drug use: No     Comment: marijuana edibles (for pain control)   • Sexual activity: Not on file   Other Topics Concern   •  Service No   • Blood Transfusions No   • Caffeine Concern No   • Occupational Exposure No   • Hobby Hazards No   • Sleep Concern No   • Stress Concern No   • Weight Concern Yes   • Special Diet No   • Back Care Yes   • Exercise Yes   • Bike Helmet No     Comment: does not ride bike    • Seat Belt Yes   • Self-Exams No   Social History Narrative   • Not on file     Social Determinants of Health     Financial Resource Strain: Not on file   Food Insecurity: Not on file   Transportation Needs: Not on file   Physical Activity: Not on file   Stress: Not on file   Social Connections: Not on file   Intimate Partner Violence: Not on file   Housing Stability: Not on file     No Known Allergies  Outpatient Encounter Medications as of 4/6/2022   Medication Sig Dispense Refill   • atorvastatin (LIPITOR) 40 MG Tab Take 1 Tablet by mouth every evening. 100 Tablet 0   • BELBUCA 75 MCG FILM Take 75 mcg by mouth 1 time a day as needed.     • tadalafil (CIALIS) 20 MG tablet tadalafil 20 mg tablet   Take 1 tablet every day by oral route as needed.     • fluticasone-salmeterol (ADVAIR) 100-50 MCG/DOSE AEROSOL POWDER, BREATH ACTIVATED ADVAIR DISKUS 100-50 MCG/DOSE AEPB     • omeprazole (PRILOSEC OTC) 20 MG tablet PRILOSEC OTC 20 MG TBEC     • lisinopril (PRINIVIL) 10 MG Tab LISINOPRIL 10 MG TABS 90 Tablet 3   • metoprolol SR (TOPROL XL) 25 MG TABLET SR 24 HR Take 0.5 Tablets by mouth every day. 45 Tablet 3   • amoxicillin (AMOXIL) 500 MG Cap      • aspirin (ASA) 81 MG Chew Tab chewable tablet Chew 1 Tab every day. 100 Tab 2   • gabapentin (NEURONTIN) 300 MG Cap Take 300 mg  "by mouth 4 times a day. Indications: twice a day  5   • ADVAIR DISKUS 100-50 MCG/DOSE AEROSOL POWDER, BREATH ACTIVATED INHALE 1 PUFF BY MOUTH TWICE A DAY *RINSE MOUTH AFTER USE* 3 Inhaler 0   • acetaminophen (TYLENOL) 500 MG Tab Take 1,000 mg by mouth 2 Times a Day.     • levothyroxine (SYNTHROID) 50 MCG Tab TAKE 1 TABLET BY MOUTH EVERY DAY 90 Tab 1   • [DISCONTINUED] tamsulosin (FLOMAX) 0.4 MG capsule TAKE ONE CAPSULE BY MOUTH EVERY DAY FOR BPH. GIVE 1/2 HOUR AFTER BREAKFAST (Patient not taking: Reported on 4/6/2022) 90 Cap 0     No facility-administered encounter medications on file as of 4/6/2022.     Review of Systems   Constitutional: Negative for malaise/fatigue.   Respiratory: Negative for cough, hemoptysis, sputum production, shortness of breath and wheezing.    Cardiovascular: Negative for chest pain, palpitations, orthopnea, claudication, leg swelling and PND.   Gastrointestinal: Negative for nausea and vomiting.   Neurological: Negative for dizziness and weakness.   Psychiatric/Behavioral: The patient is not nervous/anxious.         Objective:   /74 (BP Location: Left arm, Patient Position: Sitting, BP Cuff Size: Adult)   Pulse 63   Resp 14   Ht 1.727 m (5' 8\")   Wt 113 kg (249 lb)   SpO2 95%   BMI 37.86 kg/m²     Physical Exam  Constitutional:       General: He is not in acute distress.     Appearance: He is well-developed. He is not diaphoretic.   HENT:      Head: Normocephalic and atraumatic.   Eyes:      Pupils: Pupils are equal, round, and reactive to light.   Neck:      Vascular: No JVD.   Cardiovascular:      Rate and Rhythm: Normal rate and regular rhythm.      Heart sounds: Normal heart sounds. No murmur heard.  Pulmonary:      Effort: Pulmonary effort is normal. No respiratory distress.      Breath sounds: Normal breath sounds.   Abdominal:      General: Bowel sounds are normal. There is no distension.      Palpations: Abdomen is soft.   Skin:     General: Skin is warm and dry. "   Neurological:      Mental Status: He is alert and oriented to person, place, and time.   Psychiatric:         Behavior: Behavior normal.         Thought Content: Thought content normal.         Judgment: Judgment normal.           Treadmill Stress test   2012  1. The calculated left ventricular ejection fraction is 62%.   2.  There are no wall motion abnormalities.   3.  No evidence of infarct or reversible ischemia.    Echocardiography Laboratory  2/17/2021  No prior study is available for comparison.   Left ventricular ejection fraction is visually estimated to be 65%.  Normal regional wall motion.  No significant valve abnormalities.   Normal aortic root for body surface area. Ascending aorta diameter is   3.6 cm.    Lab Results   Component Value Date/Time    CHOLSTRLTOT 103 11/02/2021 12:39 PM    LDL 43 11/02/2021 12:39 PM    HDL 48 11/02/2021 12:39 PM    TRIGLYCERIDE 60 11/02/2021 12:39 PM       Lab Results   Component Value Date/Time    SODIUM 137 11/02/2021 12:39 PM    POTASSIUM 4.6 11/02/2021 12:39 PM    CHLORIDE 102 11/02/2021 12:39 PM    CO2 24 11/02/2021 12:39 PM    GLUCOSE 82 11/02/2021 12:39 PM    BUN 17 11/02/2021 12:39 PM    CREATININE 0.86 11/02/2021 12:39 PM    CREATININE 0.9 05/18/2009 04:20 PM     Lab Results   Component Value Date/Time    ALKPHOSPHAT 74 11/02/2021 12:39 PM    ASTSGOT 22 11/02/2021 12:39 PM    ALTSGPT 19 11/02/2021 12:39 PM    TBILIRUBIN 0.6 11/02/2021 12:39 PM          Assessment:     1. Ascending aorta dilation (CMS-HCC), mild, needs OP followup     2. Essential hypertension     3. Dyslipidemia         Medical Decision Making:  Today's Assessment / Status / Plan:     1. Coronary arteriosclerosis   - echo showed normal regional wall motion. EF 65%  - continue asa therapy   - continue metoprolol 25mg XL and atorvastatin 40mg qd     2. Hypertension:  - stable   - continue lisinopril 10mg qd and metoprolol SR 25mg qd     3. Hyperlipidemia:  - LDL 51  - continue statin therapy      4. TAA:  - ascending aorta diameter is 3.6cm  - repeat CTA showed 3.1cm    Follow up in 6 months, CT-CTA.    show

## 2022-07-11 NOTE — ED ADULT TRIAGE NOTE - CHIEF COMPLAINT QUOTE
pt c/o left upper back/shoulder and arm pain x 1 week. denies injury. reports partial pain relief with tylenol. denies cp/sob.  hx-dm, htn, right nephrectomy, breast ca

## 2022-08-24 ENCOUNTER — NON-APPOINTMENT (OUTPATIENT)
Age: 77
End: 2022-08-24

## 2022-08-24 ENCOUNTER — APPOINTMENT (OUTPATIENT)
Dept: ORTHOPEDIC SURGERY | Facility: CLINIC | Age: 77
End: 2022-08-24

## 2022-08-24 VITALS
DIASTOLIC BLOOD PRESSURE: 90 MMHG | BODY MASS INDEX: 25.39 KG/M2 | HEIGHT: 66 IN | SYSTOLIC BLOOD PRESSURE: 195 MMHG | HEART RATE: 98 BPM | WEIGHT: 158 LBS

## 2022-08-24 DIAGNOSIS — R29.898 OTHER SYMPTOMS AND SIGNS INVOLVING THE MUSCULOSKELETAL SYSTEM: ICD-10-CM

## 2022-08-24 PROCEDURE — 99203 OFFICE O/P NEW LOW 30 MIN: CPT

## 2022-08-24 NOTE — END OF VISIT
[FreeTextEntry3] : This note was written by Margaux Rose on 08/24/2022 acting solely as a scribe for Dr. Oswald Lopez.\par  \par All medical record entries made by the Scribe were at my, Dr. Oswald Lopez, direction and personally dictated by me on 08/24/2022. I have personally reviewed the chart and agree that the record accurately reflects my personal performance of the history, physical exam, assessment and plan.

## 2022-08-24 NOTE — CONSULT LETTER
[Dear  ___] : Dear  [unfilled], [Consult Letter:] : I had the pleasure of evaluating your patient, [unfilled]. [Please see my note below.] : Please see my note below. [Consult Closing:] : Thank you very much for allowing me to participate in the care of this patient.  If you have any questions, please do not hesitate to contact me. [Sincerely,] : Sincerely, [FreeTextEntry3] : Oswald Lopez M.D.\par Surgery of the Hand and Upper Extremity\par Orthopaedic Surgery\par Chief, Hand Service, [facility]\par Director, Hand Service, [facility]\par  Of Orthopaedic Surgery, [facility]\par [facility] Email: [email or web address] Office Phone: [phone]

## 2022-08-24 NOTE — ADDENDUM
[FreeTextEntry1] : I, Margaux Rose, acted solely as a scribe for Dr. Lopez on this date on 08/24/2022.

## 2022-08-24 NOTE — DISCUSSION/SUMMARY
[FreeTextEntry1] : She has complaints of left hand weakness.  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  She states that she is not particular bothered by her symptoms.\par \par I had a discussion with the patient regarding today's visit, the prognosis of this diagnosis, and treatment recommendations and options. At this time, given that her symptoms are relatively mild, I have recommended observation. She will follow up as needed, according to her symptoms.  If her symptoms worsen in the future, then I will obtain EMGs.\par \par She has agreed to the above plan of management and has expressed full understanding.  All questions were fully answered to their satisfaction. \par \par My cumulative time spent on this visit included: Preparation for the visit, review of the medical records, review of pertinent diagnostic studies, examination and counseling of the patient on the above diagnosis, treatment plan and prognosis, orders of diagnostic tests, medication and/or appropriate procedures and documentation in the medical records of today's visit.

## 2022-08-24 NOTE — HISTORY OF PRESENT ILLNESS
[Right] : right hand dominant [FreeTextEntry1] : She comes in today for evaluation of left hand pain which began on 7/4/22. She has complains of pain as well as what she describes as a permanent weakness to the left hand. She went to the ER and was told she was experiencing gout. She states she feels a sand paper feeling to the hands. She also reports a delay to the left middle finger "opening" when she tries to extend the digit. She reports to have taken Prednisone last night as her pain was so severe. \par \par She has a medical history which includes breast cancer. She was treated with chemotherapy. She has developed neuropathy secondary to the chemotherapy. \par \par She is status post left carpal tunnel release years ago. \par \par She was referred by Dr. Socorro Peck.

## 2022-08-24 NOTE — PHYSICAL EXAM
[de-identified] : - Constitutional: This is a female in no obvious distress.  \par - Psych: Patient is alert and oriented to person, place and time.  Patient has a normal mood and affect.\par - Cardiovascular: Normal pulses throughout the upper extremities.  No significant varicosities are noted in the upper extremities. \par - Neuro: Patient has normal coordination.\par - Respiratory:  Patient exhibits no evidence of shortness of breath or difficulty breathing.\par - Skin: No rashes, lesions, or other abnormalities are noted in the upper extremities.\par \par --- \par \par Examination of her left hand and upper extremity demonstrates no obvious atrophy.  She does state that her fingers feel somewhat like sandpaper but she does have intact sensation to light touch bilaterally in the hands along the radial, ulnar and median nerve distributions.  Provocative signs for carpal tunnel syndrome are negative.  There is no evidence of trigger finger.  She has full flexion and extension of the digits into the palm.

## 2022-08-25 RX ORDER — DICLOFENAC SODIUM 1% 10 MG/G
GEL TOPICAL
Refills: 0 | Status: ACTIVE | COMMUNITY

## 2022-08-25 RX ORDER — HYDRALAZINE HYDROCHLORIDE 10 MG/1
10 TABLET ORAL
Refills: 0 | Status: ACTIVE | COMMUNITY

## 2022-08-25 RX ORDER — ANASTROZOLE TABLETS 1 MG/1
1 TABLET ORAL
Refills: 0 | Status: ACTIVE | COMMUNITY

## 2022-08-25 RX ORDER — ATORVASTATIN CALCIUM 40 MG/1
40 TABLET, FILM COATED ORAL
Refills: 0 | Status: ACTIVE | COMMUNITY

## 2022-08-25 RX ORDER — ASCORBIC ACID 500 MG
TABLET ORAL
Refills: 0 | Status: ACTIVE | COMMUNITY

## 2022-08-25 RX ORDER — NIFEDIPINE 60 MG/1
60 TABLET, EXTENDED RELEASE ORAL
Refills: 0 | Status: ACTIVE | COMMUNITY

## 2022-08-25 RX ORDER — GABAPENTIN 300 MG/1
300 CAPSULE ORAL
Refills: 0 | Status: ACTIVE | COMMUNITY

## 2022-08-25 RX ORDER — ALENDRONATE SODIUM 70 MG/1
70 TABLET ORAL
Refills: 0 | Status: ACTIVE | COMMUNITY

## 2022-08-25 RX ORDER — LISINOPRIL 10 MG/1
10 TABLET ORAL
Refills: 0 | Status: ACTIVE | COMMUNITY

## 2022-08-25 RX ORDER — VITAMIN E ACETATE 670 MG
CAPSULE ORAL
Refills: 0 | Status: ACTIVE | COMMUNITY

## 2022-08-25 RX ORDER — PREDNISONE 20 MG/1
20 TABLET ORAL
Refills: 0 | Status: ACTIVE | COMMUNITY

## 2022-11-29 NOTE — PROGRESS NOTE ADULT - MS GEN HX ROS MEA POS PC
[facility] sent to Pt to schedule an appt to see Dr. Mable Owens to discuss weight loss concern. joint swelling/joint pain/arthritis/leg pain R

## 2023-08-05 NOTE — PROGRESS NOTE ADULT - PROBLEM SELECTOR PLAN 1
-Met sepsis criteria in the ED (T 39.4-39.7 C, -124, RR 20, lactate 2.9), with severe leukopenia (WBC 0.35) and neutropenia (0.01 K/ul), likely secondary to HCAP  -Pt received 2L NS, cefepime vanco, APAP and ibuprofen.   -Source possibly PNA in the setting of cough and opacity on CXR vs GI in the setting of diarrhea  -Continue IV Vanco, Cefepime, and Zithromax. Monitor Vanco level.   -vanco trough 7.7, Increase maintenance dose   -F/u blood/urine cultures  -check stool studies   -monitor VS q4h. 4 -Met sepsis criteria in the ED (T 39.4-39.7 C, -124, RR 20, lactate 2.9), with severe leukopenia (WBC 0.35) and neutropenia (0.01 K/ul), likely secondary to HCAP  -Continues to be febrile, WBC decrease to 0.81  -Source possibly PNA in the setting of cough and opacity on CXR vs GI in the setting of diarrhea  -Continue IV Vanco, Cefepime, and Zithromax. Monitor Vanco level.   -vanco trough 7.7, start weight based vanco dosing (1000mg q24hr).  -F/u blood/urine cultures  -check stool studies   -monitor VS q4h. -Met sepsis criteria in the ED (T 39.4-39.7 C, -124, RR 20, lactate 2.9), with severe leukopenia (WBC 0.35) and neutropenia (0.01 K/ul), likely secondary to HCAP  -Continues to be febrile, WBC decreased to 0.81  -Source possibly PNA in the setting of cough and opacity on CXR vs GI in the setting of diarrhea  -Continue IV Vanco, Cefepime, and Zithromax. Monitor Vanco level.   -vanco trough 7.7, start weight based vanco dosing (1000mg q24hr).  -F/u blood/urine cultures  -check stool studies   -monitor VS q4h.

## 2023-11-30 NOTE — ED ADULT NURSE NOTE - NSFALLRSKASSESASSIST_ED_ALL_ED
Minocycline Pregnancy And Lactation Text: This medication is Pregnancy Category D and not consider safe during pregnancy. It is also excreted in breast milk. Aklief counseling:  Patient advised to apply a pea-sized amount only at bedtime and wait 30 minutes after washing their face before applying. If too drying, patient may add a non-comedogenic moisturizer. The most commonly reported side effects including irritation, redness, scaling, dryness, stinging, burning, itching, and increased risk of sunburn. The patient verbalized understanding of the proper use and possible adverse effects of retinoids. All of the patient's questions and concerns were addressed. High Dose Vitamin A Pregnancy And Lactation Text: High dose vitamin A therapy is contraindicated during pregnancy and breast feeding. Doxycycline Counseling:  Patient counseled regarding possible photosensitivity and increased risk for sunburn. Patient instructed to avoid sunlight, if possible. When exposed to sunlight, patients should wear protective clothing, sunglasses, and sunscreen. The patient was instructed to call the office immediately if the following severe adverse effects occur:  hearing changes, easy bruising/bleeding, severe headache, or vision changes. The patient verbalized understanding of the proper use and possible adverse effects of doxycycline. All of the patient's questions and concerns were addressed. Topical Retinoid Pregnancy And Lactation Text: This medication is Pregnancy Category C. It is unknown if this medication is excreted in breast milk. Azelaic Acid Counseling: Patient counseled that medicine may cause skin irritation and to avoid applying near the eyes. In the event of skin irritation, the patient was advised to reduce the amount of the drug applied or use it less frequently. The patient verbalized understanding of the proper use and possible adverse effects of azelaic acid. All of the patient's questions and concerns were addressed. Tazorac Pregnancy And Lactation Text: This medication is not safe during pregnancy. It is unknown if this medication is excreted in breast milk. Erythromycin Counseling:  I discussed with the patient the risks of erythromycin including but not limited to GI upset, allergic reaction, drug rash, diarrhea, increase in liver enzymes, and yeast infections. Azithromycin Pregnancy And Lactation Text: This medication is considered safe during pregnancy and is also secreted in breast milk. Topical Sulfur Applications Counseling: Topical Sulfur Counseling: Patient counseled that this medication may cause skin irritation or allergic reactions. In the event of skin irritation, the patient was advised to reduce the amount of the drug applied or use it less frequently. The patient verbalized understanding of the proper use and possible adverse effects of topical sulfur application. All of the patient's questions and concerns were addressed. Isotretinoin Pregnancy And Lactation Text: This medication is Pregnancy Category X and is considered extremely dangerous during pregnancy. It is unknown if it is excreted in breast milk. Spironolactone Counseling: Patient advised regarding risks of diarrhea, abdominal pain, hyperkalemia, birth defects (for female patients), liver toxicity and renal toxicity. The patient may need blood work to monitor liver and kidney function and potassium levels while on therapy. The patient verbalized understanding of the proper use and possible adverse effects of spironolactone. All of the patient's questions and concerns were addressed. Erythromycin Pregnancy And Lactation Text: This medication is Pregnancy Category B and is considered safe during pregnancy. It is also excreted in breast milk. Birth Control Pills Counseling: Birth Control Pill Counseling: I discussed with the patient the potential side effects of OCPs including but not limited to increased risk of stroke, heart attack, thrombophlebitis, deep venous thrombosis, hepatic adenomas, breast changes, GI upset, headaches, and depression. The patient verbalized understanding of the proper use and possible adverse effects of OCPs. All of the patient's questions and concerns were addressed. Topical Retinoids Recommendations: Apply pea-sized amount to entire face after moisturizer every 2 nights and after 2 weeks apply it every other night, increase to nightly if tolerated. Suspend use if redness or extreme dryness occurs including peeling/flaky skin. Cannot be used during pregnancy or breastfeeding. Azelaic Acid Pregnancy And Lactation Text: This medication is considered safe during pregnancy and breast feeding. Sunscreen Recommendations: Noncomedogenic oil-free sunscreen SPF 50+ (preferably mineral sunscreen with zinc oxide) Tetracycline Counseling: Patient counseled regarding possible photosensitivity and increased risk for sunburn. Patient instructed to avoid sunlight, if possible. When exposed to sunlight, patients should wear protective clothing, sunglasses, and sunscreen. The patient was instructed to call the office immediately if the following severe adverse effects occur:  hearing changes, easy bruising/bleeding, severe headache, or vision changes. The patient verbalized understanding of the proper use and possible adverse effects of tetracycline. All of the patient's questions and concerns were addressed. Patient understands to avoid pregnancy while on therapy due to potential birth defects. Benzoyl Peroxide Pregnancy And Lactation Text: This medication is Pregnancy Category C. It is unknown if benzoyl peroxide is excreted in breast milk. Dapsone Counseling: I discussed with the patient the risks of dapsone including but not limited to hemolytic anemia, agranulocytosis, rashes, methemoglobinemia, kidney failure, peripheral neuropathy, headaches, GI upset, and liver toxicity. Patients who start dapsone require monitoring including baseline LFTs and weekly CBCs for the first month, then every month thereafter. The patient verbalized understanding of the proper use and possible adverse effects of dapsone. All of the patient's questions and concerns were addressed. Winlevi Counseling:  I discussed with the patient the risks of topical clascoterone including but not limited to erythema, scaling, itching, and stinging. Patient voiced their understanding. Doxycycline Pregnancy And Lactation Text: This medication is Pregnancy Category D and not consider safe during pregnancy. It is also excreted in breast milk but is considered safe for shorter treatment courses. Tazorac Counseling:  Patient advised that medication is irritating and drying. Patient may need to apply sparingly and wash off after an hour before eventually leaving it on overnight. The patient verbalized understanding of the proper use and possible adverse effects of tazorac. All of the patient's questions and concerns were addressed. Azithromycin Counseling:  I discussed with the patient the risks of azithromycin including but not limited to GI upset, allergic reaction, drug rash, diarrhea, and yeast infections. Dapsone Pregnancy And Lactation Text: This medication is Pregnancy Category C and is not considered safe during pregnancy or breast feeding. Use Enhanced Medication Counseling?: No Winlevi Pregnancy And Lactation Text: This medication is considered safe during pregnancy and breastfeeding. Sarecycline Counseling: Patient advised regarding possible photosensitivity and discoloration of the teeth, skin, lips, tongue and gums. Patient instructed to avoid sunlight, if possible. When exposed to sunlight, patients should wear protective clothing, sunglasses, and sunscreen. The patient was instructed to call the office immediately if the following severe adverse effects occur:  hearing changes, easy bruising/bleeding, severe headache, or vision changes. The patient verbalized understanding of the proper use and possible adverse effects of sarecycline. All of the patient's questions and concerns were addressed. Bactrim Counseling:  I discussed with the patient the risks of sulfa antibiotics including but not limited to GI upset, allergic reaction, drug rash, diarrhea, dizziness, photosensitivity, and yeast infections. Rarely, more serious reactions can occur including but not limited to aplastic anemia, agranulocytosis, methemoglobinemia, blood dyscrasias, liver or kidney failure, lung infiltrates or desquamative/blistering drug rashes. Aklief Pregnancy And Lactation Text: It is unknown if this medication is safe to use during pregnancy. It is unknown if this medication is excreted in breast milk. Breastfeeding women should use the topical cream on the smallest area of the skin for the shortest time needed while breastfeeding. Do not apply to nipple and areola. Topical Clindamycin Counseling: Patient counseled that this medication may cause skin irritation or allergic reactions. In the event of skin irritation, the patient was advised to reduce the amount of the drug applied or use it less frequently. The patient verbalized understanding of the proper use and possible adverse effects of clindamycin. All of the patient's questions and concerns were addressed. Minocycline Counseling: Patient advised regarding possible photosensitivity and discoloration of the teeth, skin, lips, tongue and gums. Patient instructed to avoid sunlight, if possible. When exposed to sunlight, patients should wear protective clothing, sunglasses, and sunscreen. The patient was instructed to call the office immediately if the following severe adverse effects occur:  hearing changes, easy bruising/bleeding, severe headache, or vision changes. The patient verbalized understanding of the proper use and possible adverse effects of minocycline. All of the patient's questions and concerns were addressed. Benzoyl Peroxide Counseling: Patient counseled that medicine may cause skin irritation and bleach clothing. In the event of skin irritation, the patient was advised to reduce the amount of the drug applied or use it less frequently. The patient verbalized understanding of the proper use and possible adverse effects of benzoyl peroxide. All of the patient's questions and concerns were addressed. Spironolactone Pregnancy And Lactation Text: This medication can cause feminization of the male fetus and should be avoided during pregnancy. The active metabolite is also found in breast milk. Detail Level: Zone Bactrim Pregnancy And Lactation Text: This medication is Pregnancy Category D and is known to cause fetal risk. It is also excreted in breast milk. Topical Clindamycin Pregnancy And Lactation Text: This medication is Pregnancy Category B and is considered safe during pregnancy. It is unknown if it is excreted in breast milk. Topical Retinoid counseling:  Patient advised to apply a pea-sized amount only at bedtime and wait 30 minutes after washing their face before applying. If too drying, patient may add a non-comedogenic moisturizer. The patient verbalized understanding of the proper use and possible adverse effects of retinoids. All of the patient's questions and concerns were addressed. Topical Sulfur Applications Pregnancy And Lactation Text: This medication is Pregnancy Category C and has an unknown safety profile during pregnancy. It is unknown if this topical medication is excreted in breast milk. High Dose Vitamin A Counseling: Side effects reviewed, pt to contact office should one occur. Moisturizer Recommendations: Non-comedogenic, oil-free moisturizer Birth Control Pills Pregnancy And Lactation Text: This medication should be avoided if pregnant and for the first 30 days post-partum. Isotretinoin Counseling: Patient should get monthly blood tests, not donate blood, not drive at night if vision affected, not share medication, and not undergo elective surgery for 6 months after tx completed. Side effects reviewed, pt to contact office should one occur. no

## 2024-01-26 NOTE — ED ADULT NURSE NOTE - ED COMFORT CARE
Occupational Therapy Visit    Visit Type: Daily Treatment Note  Visit: 8  Referring Provider: Roque Agrawal MD  Medical Diagnosis (from order): S49.91XA - Injury of right shoulder, initial encounter  S46.911A - Strain of right shoulder, initial encounter   Patient alert and oriented X3.    SUBJECTIVE                                                                                                               Patient stated she needs to leave early due to a family situation. Reports having increased pain and soreness after her last session. Went to work today at [employer] and had to leave after about 2 hours due to her shoulder bothering her even with wearing her sling. 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     Pain / Symptoms  - Pain rating (out of 10): Current: 8      OBJECTIVE                                                                                                                                        Treatment     Manual Therapy   DTM and trigger point release along the right upper trap, scalenes and infraspinatus, pec    PROM of the right shoulder to tolerance        Skilled input: verbal instruction/cues, tactile instruction/cues, posture correction and demonstration    Writer verbally educated and received verbal consent for hand placement, positioning of patient, and techniques to be performed today from patient for therapist position for techniques, hand placement and palpation for techniques and clothing adjustments for techniques as described above and how they are pertinent to the patient's plan of care.  Home Exercise Program  Access Code: [email or web address]  URL: [email or web address]  Date: 11/22/2023  Prepared by: Poppy Curtis    Exercises  - Seated Upper Trapezius Stretch  - 3 x daily - 7 x weekly - 1 sets - 3 reps - 15 hold  - Standing Bicep Stretch at Wall  - 1  x daily - 7 x weekly - 1 sets - 3 reps - 15 hold  - Seated Scapular Retraction  - 3 x daily - 7 x weekly - 1 sets - 10 reps - 2 hold  - Supine Shoulder Flexion AAROM with Dowel  - 3 x daily - 7 x weekly - 1 sets - 10 reps  - Supine Shoulder Press AAROM in Abduction with Dowel  - 3 x daily - 7 x weekly - 1 sets - 10 reps    Ulnar nerve flossing  Pendulums  Supine Bilateral pec stretch      ASSESSMENT                                                                                                            Session shortened today due to patient needing to leave early due to family situation. Appeared to be very upset and stressed from recent events and needing to leave work today due to pain levels. Increased pain and popping noted today with PROM. Will continue to progress as tolerated.   Pain/symptoms after session (out of 10): 8  Education:   - Results of above outlined education: Verbalizes understanding and Demonstrates understanding    PLAN                                                                                                                           Suggestions for next session as indicated: Progress per plan of care, modalities as needed, STM, PROM, active range of motion, postural retraining as tolerated.        Therapy procedure time and total treatment time can be found documented on the Time Entry flowsheet   Patient informed .

## 2024-04-09 NOTE — PACU DISCHARGE NOTE - MENTAL STATUS: PATIENT PARTICIPATION
Continue your current medications, please refer to your medication list for any changes.      NO CHANGED TO MEDICATIONS MADE    Your fasting blood sugar goal is between .  Check your glucose before meals  Your post prandial blood sugar goal is less than 170.  Check your glucose 2 hours after you complete a meal.    Your hgb a1c goal is less than 7%.  This is checked usually every 3 months on blood work.  Bring your blood sugar log to each visit and call if sugars less than 80.  This will help us adjust your medications if needed.    Blood pressure goal of 110-130/60-80 advised to prevent kidney and heart damage.  LDL (bad cholesterol) goal of less than 70 to prevent heart disease  Recommend you see an ophthalmologist every year to detect early retinal damage from diabetes  Recommend you check you feet DAILY for any foot sores, ulcers, or infection     Contact us with any foot injuries or infections, significant fluctuations in sugars, medication concerns, or health questions    Encouraged heart healthy diet rich in fruits, vegetables, whole grains, nuts, lean meats, and fish encouraged (Mediterranean-style diet).  Avoid carbohydrates/sugars such as white rice, breads, sweets, candy, and soda.    Recommend regular cardio exercise with goal of 30 minutes 5 days per week.  You may need to start slow, moderate intensity (I.e. brisk walking) for at least 10min and slowly work up to goal.  If you do develop any chest pain, chest pressure, dizziness, difficulty breathing, stop and call our office or go to ER.     Please call the office or send a message with any new or worsening medical concerns:  [phone].  Messages are typically answered during clinic hours, if you have a medical emergency, please call our office ASAP or go to ER/Call [phone].    
Awake

## 2024-04-14 NOTE — PROGRESS NOTE ADULT - PROBLEM SELECTOR PLAN 5
-syncope likely 2/2 orthostatic hypotension/hypovolemia in the setting of sepsis and diarrhea  -CT head neg  -EKG showed sinus tach  -BPs stable with SBP in 130s  -check, orthostatics  -check TTE  -tele monitor. 111 -chemo induced pancytopenia   -AM labs showed Hb of 7.6, transfused w/ 1 U PRBC yesterday  -neupogen as per Onc

## 2024-04-20 NOTE — PROGRESS NOTE ADULT - PROBLEM SELECTOR PROBLEM 9
Your recent COVID test was positive.     You are a candidate for Paxlovid. Allergies, Medications, and labs reviewed.    STOP ATORVASTATIN FOR 5 DAYS WHILE TAKING PAXLOVID. MAY RESUME ATORVASTATIN 3 DAYS AFTER YOU FINISH WITH PAXLOVID.     Currently, the CDC recommends staying at home in self isolation for at least 5 days. After that, if you are without a fever and symptoms are improving, you may go out, but only if wearing a mask for an additional 5 days.     Thankfully, most people recover uneventfully. The mainstay of treatment for most people remains focused on symptom control, isolating and watching for signs of worsening illness. You should drink plenty of fluids, eat when you are able, and rest as much as possible.    Recommend treating symptoms with OTC medications: Flonase for congestions, Mucinex for Phlegm, Robitussin DM or Delsym for cough, Tylenol/Ibuprofen for fever/body aches. Cepacol lozenges and saltwater gargles for sore throat.      Recommend Vitamin D, C and Zinc.    We do not prescribe antibiotics for viral illnesses; however, we can treat secondary infections should the need arise.    Please seek more urgent medical attention if you develop any of the following:  Chest pain  Shortness of breath  Bluish lips or face  Severe headache   Severe dizziness or passing out  New confusion  Inability to stay awake  Extreme weakness    If you have a pulse oximeter, check it at least daily. Seek urgent medical attention if it drops to 92% or below.   Malignant neoplasm of right breast
